# Patient Record
Sex: MALE | Employment: OTHER | URBAN - METROPOLITAN AREA
[De-identification: names, ages, dates, MRNs, and addresses within clinical notes are randomized per-mention and may not be internally consistent; named-entity substitution may affect disease eponyms.]

---

## 2018-02-22 ENCOUNTER — TRANSFERRED RECORDS (OUTPATIENT)
Dept: HEALTH INFORMATION MANAGEMENT | Facility: CLINIC | Age: 68
End: 2018-02-22

## 2018-04-11 ENCOUNTER — TRANSFERRED RECORDS (OUTPATIENT)
Dept: HEALTH INFORMATION MANAGEMENT | Facility: CLINIC | Age: 68
End: 2018-04-11

## 2018-05-23 ENCOUNTER — TRANSFERRED RECORDS (OUTPATIENT)
Dept: HEALTH INFORMATION MANAGEMENT | Facility: CLINIC | Age: 68
End: 2018-05-23

## 2018-05-29 ENCOUNTER — TRANSFERRED RECORDS (OUTPATIENT)
Dept: HEALTH INFORMATION MANAGEMENT | Facility: CLINIC | Age: 68
End: 2018-05-29

## 2018-06-01 ENCOUNTER — TRANSFERRED RECORDS (OUTPATIENT)
Dept: HEALTH INFORMATION MANAGEMENT | Facility: CLINIC | Age: 68
End: 2018-06-01

## 2018-09-14 ENCOUNTER — OFFICE VISIT (OUTPATIENT)
Dept: DERMATOLOGY | Facility: CLINIC | Age: 68
End: 2018-09-14
Payer: COMMERCIAL

## 2018-09-14 DIAGNOSIS — L23.89 ALLERGIC CONTACT DERMATITIS DUE TO OTHER AGENTS: Primary | ICD-10-CM

## 2018-09-14 DIAGNOSIS — L20.81 ATOPIC NEURODERMATITIS: ICD-10-CM

## 2018-09-14 RX ORDER — FLOMAX 0.4 MG/1
CAPSULE ORAL
COMMUNITY
Start: 2018-07-20

## 2018-09-14 RX ORDER — DOXEPIN HYDROCHLORIDE 50 MG/1
CAPSULE ORAL
COMMUNITY
Start: 2018-08-30

## 2018-09-14 RX ORDER — HYDROXYZINE HYDROCHLORIDE 25 MG/1
TABLET, FILM COATED ORAL
COMMUNITY
Start: 2018-08-30 | End: 2022-07-29

## 2018-09-14 RX ORDER — FOLIC ACID 1 MG/1
TABLET ORAL
COMMUNITY
Start: 2018-04-11 | End: 2022-07-29

## 2018-09-14 RX ORDER — PANTOPRAZOLE SODIUM 40 MG/1
TABLET, DELAYED RELEASE ORAL
COMMUNITY
Start: 2018-07-31

## 2018-09-14 RX ORDER — GABAPENTIN 300 MG/1
CAPSULE ORAL
COMMUNITY
Start: 2018-08-30 | End: 2022-07-29

## 2018-09-14 RX ORDER — CETIRIZINE HYDROCHLORIDE 10 MG/1
TABLET ORAL
COMMUNITY
Start: 2018-04-11 | End: 2022-07-29

## 2018-09-14 RX ORDER — ATORVASTATIN CALCIUM 80 MG/1
TABLET, FILM COATED ORAL
COMMUNITY
Start: 2018-04-11 | End: 2022-07-29

## 2018-09-14 RX ORDER — TRIAMCINOLONE ACETONIDE 1 MG/G
CREAM TOPICAL
COMMUNITY
Start: 2018-04-11 | End: 2022-07-29

## 2018-09-14 ASSESSMENT — PAIN SCALES - GENERAL: PAINLEVEL: NO PAIN (0)

## 2018-09-14 NOTE — PROGRESS NOTES
[facility] Dermatology Note      Dermatology Problem List:    Specialty Problems     None          CC:   Allergic Reaction (Mike is here for an allergy concult. He states that he has had itching for about 3 years. )      Encounter Date: Sep 14, 2018    History of Present Illness:  Mr. Mike Naidu is a 68 year old male who presents as a referral from [facility].    4th of July 3 years ago the itching started out of the blue. Since then every day itching.  In May 2018 in [facility] normal bone marrow exam (no signs for Lymphoma)  However, in gene rearrangement analysis some suspicion for monoclonal T-cell population  Since 2015 Methotrexate, UVB with no improvement  In Histology in [place] chronic dermatitis with eosinophilic spongiosis (2018)    Medications: Atorvastatin since about 3 years; Metoprolol since 10 years, Pantoprazole since 12 years.     Past Medical History:   There is no problem list on file for this patient.    No past medical history on file.    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 Patient has the following hobbies or non-occupational exposure: fishing and grand kids      Family History:  No family history on file.  Brothers daughter has atopic eczema  Patients own kids (5) and grand kids ==> on daughter had as child eczema and her son as well    Medications:  No current outpatient prescriptions on file.       Review of Systems:  -As per HPI  -Constitutional: The patient denies fatigue, fevers, chills, unintended weight loss, and night sweats.  -HEENT: Patient denies nonhealing oral sores.  -Skin: As above in HPI. No additional skin  concerns.    Physical exam:  Vitals: There were no vitals taken for this visit.  GEN: This is a well developed, well-nourished male in no acute distress, in a pleasant mood.    SKIN: Total skin excluding the undergarment areas was performed. The exam included the head/face, neck, both arms, chest, back, abdomen, both legs, digits and/or nails.   Subacute to chronic eczema on extremities and as well erythema on trunk. Generally dry skin and on extremities scratch marks and prurigo-like lesions. Over ears subacute eczemas with swelling and as well front and scalp with itch and desquamation    -No other lesions of concern on areas examined.     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 No oral Corticosteroids for 1 month before tests. Antihistamines are OK    Total IgE, CBC, spec IgE D. Pteronyssinus    Plan in future prick tests with HDM and moulds    --> if all tests negatives and no improvement after stopping medications, then maybe plan use of Dupixent.      Order for PATCH TESTS    [] Outpatient  [] Inpatient: [facility]..../ Bed ....      Skin Atopy (atopic dermatitis) [x] Yes   [] No  Rhinitis/Sinusitis:   [x] Yes   [] No  Allergic Asthma:   [] Yes   [x] No  Food Allergy:   [] Yes   [x] No  Leg ulcers:   [] Yes   [x] No  Hand eczema:   [x] Yes   [] No   Leading hand:   [x] R   [] L       [] Ambidextrous                        Reason for tests (suspected allergy): generalized subacute to chronic eczema with spongiosis and peripheral and skin  Eosinophilia  Known previous allergies: Sulphonamides, Valproic acid and Procaine    Standardized panels  [x] Standard panel (40 tests)  [x] Preservatives & Antimicrobials (31 tests)  [x] Emulsifiers & Additives (25 tests)   [] Perfumes/Flavours & Plants (25 tests)  [] Hairdresser panel (12 tests)  [] Rubber Chemicals (22 tests)  [] Plastics (26 tests)  [] Colorants/Dyes/Food additives (20 tests)  [] Metals (implants/dental) (23 tests)  [] Local anaesthetics/NSAIDs (12 tests)  [] Antibiotics & Antimycotics (14 tests)   [x] Corticosteroids (15 tests)   [] Photopatch test (32 tests)   [] others: ...      [] Patient's own products: ...    DO NOT test if chemical or biological identity is unknown!     always ask from patient the product information and safety sheets (MSDS)     [] Patient needs consultation with Allergy team (changes of tests may apply)  [x] Tests discussed with Allergy team (can have direct appointment for patch tests)    Follow-up for allergy tests    Total visit time > 30 mins, > 50% counseling and coordination of care.

## 2018-09-14 NOTE — MR AVS SNAPSHOT
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 No oral Corticosteroids for 1 month before tests. Antihistamines are OK    Total IgE, CBC, spec IgE D. Pteronyssinus    Plan in future prick tests with HDM and moulds    --> if all tests negatives and no improvement after stopping medications, then maybe plan use of Dupixent.      Order for PATCH TESTS    [] Outpatient  [] Inpatient: [facility]..../ Bed ....      Skin Atopy (atopic dermatitis) [x] Yes   [] No  Rhinitis/Sinusitis:   [x] Yes   [] No  Allergic Asthma:   [] Yes   [x] No  Food Allergy:   [] Yes   [x] No  Leg ulcers:   [] Yes   [x] No  Hand eczema:   [x] Yes   [] No   Leading hand:   [x] R   [] L       [] Ambidextrous                        Reason for tests (suspected allergy): generalized subacute to chronic eczema with spongiosis and peripheral and skin Eosinophilia  Known previous allergies: Sulphonamides, Valproic acid and Procaine    Standardized panels  [x] Standard panel (40 tests)  [x] Preservatives & Antimicrobials (31 tests)  [x] Emulsifiers & Additives (25 tests)   [] Perfumes/Flavours &  Plants (25 tests)  [] Hairdresser panel (12 tests)  [] Rubber Chemicals (22 tests)  [] Plastics (26 tests)  [] Colorants/Dyes/Food additives (20 tests)  [] Metals (implants/dental) (23 tests)  [] Local anaesthetics/NSAIDs (12 tests)  [] Antibiotics & Antimycotics (14 tests)   [x] Corticosteroids (15 tests)   [] Photopatch test (32 tests)   [] others: ...      [] Patient's own products: ...    DO NOT test if chemical or biological identity is unknown!     always ask from patient the product information and safety sheets (MSDS)             Follow-ups after your visit        Your next 10 appointments already scheduled     Sep 24, 2018  1:45 PM CDT   (Arrive by 1:30 PM)   Return Allergy with Leon Lanier MD   [facility]ermat[facility] ([place] [facility])    [address] [place] [id number] [phone]            Sep 26, 2018  2:45 PM CDT   (Arrive by 2:30 PM)   Return Allergy with Leon Lanier MD   [facility] ([place] [facility])    [address] [place] [id number] [phone]            Sep 28, 2018  9:00 AM CDT   (Arrive by 8:45 AM)   Return Allergy with Leon Lanier MD   [facility] ([place] [facility])    [address] [place] [id number] [phone]              Future tests that were ordered for you today     Open Future Orders        Priority Expected Expires Ordered    IgE Routine  9/14/2019 9/14/2018    CBC with platelets differential Routine  9/14/2019 9/14/2018            Who to contact     Please call your clinic at [phone] to:    Ask questions about your health    Make or cancel appointments    Discuss your medicines    Learn about your test results    Speak to your doctor            Additional Information About Your Visit        MyChart Information     Fire[employer] is an electronic gateway that provides easy, online access to your medical  records. With amiando, you can request a clinic appointment, read your test results, renew a prescription or communicate with your care team.     To sign up for amiando visit the website at [email or web address]   You will be asked to enter the access code listed below, as well as some personal information. Please follow the directions to create your username and password.     Your access code is: [email or web address]  Expires: 2018  6:31 AM     Your access code will  in 90 days. If you need help or a new code, please contact your [facility] or call [phone] for assistance.        Care EveryWhere ID     This is your Care EveryWhere ID. This could be used by other organizations to access your [place] medical records  [id number]         Blood Pressure from Last 3 Encounters:   No data found for BP    Weight from Last 3 Encounters:   No data found for Wt               Primary Care Provider    None Specified       No primary provider on file.        Equal Access to Services     [facility] : Hadii kaykay bullardo Solauro, waaxda luqadaha, qaybta kaalmada ademiladyaquang, nanette taylor . So [facility] [phone].    ATENCIÓN: Si habla español, tiene a [place] disposición servicios gratuitos de asistencia lingüística. Llame al [phone].    We comply with applicable federal civil rights laws and [place] laws. We do not discriminate on the basis of race, color, national origin, age, disability, sex, sexual orientation, or gender identity.            Thank you!     Thank you for choosing [facility] DERMATOLOGY  for your care. Our goal is always to provide you with excellent care. Hearing back from our patients is one way we can continue to improve our services. Please take a few minutes to complete the written survey that you may receive in the mail after your visit with us. Thank you!             Your Updated Medication List - Protect others around you: Learn  how to safely use, store and throw away your medicines at www.disposemymeds.org.          This list is accurate as of 9/14/18  2:46 PM.  Always use your most recent med list.                   Brand Name Dispense Instructions for use Diagnosis    atorvastatin 80 MG tablet    LIPITOR          cetirizine 10 MG tablet    zyrTEC          doxepin 50 MG capsule    SINEquan          FLOMAX 0.4 MG capsule   Generic drug:  tamsulosin           folic acid 1 MG tablet    FOLVITE          gabapentin 300 MG capsule    NEURONTIN          hydrOXYzine 25 MG tablet    ATARAX          pantoprazole 40 MG EC tablet    PROTONIX          RANITIDINE 150 MAX STRENGTH 150 MG tablet   Generic drug:  ranitidine           triamcinolone 0.1 % cream    KENALOG

## 2018-09-14 NOTE — LETTER
9/14/2018       RE: Mike Naidu  [address] [place] [address]     Dear Colleague,    Thank you for referring your patient, Mike Naidu, to the [facility] DERMATOLOGY at [facility]. Please see a copy of my visit note below.    [facility] Dermatology Note      Dermatology Problem List:    Specialty Problems     None          CC:   Allergic Reaction (Mike is here for an allergy concult. He states that he has had itching for about 3 years. )      Encounter Date: Sep 14, 2018    History of Present Illness:  Mr. Mike Naidu is a 68 year old male who presents as a referral from [place].    4th of July 3 years ago the itching started out of the blue. Since then every day itching.  In May 2018 in [facility] normal bone marrow exam (no signs for Lymphoma)  However, in gene rearrangement analysis some suspicion for monoclonal T-cell population  Since 2015 Methotrexate, UVB with no improvement  In Histology in [place] chronic dermatitis with eosinophilic spongiosis (2018)    Medications: Atorvastatin since about 3 years; Metoprolol since 10 years, Pantoprazole since 12 years.     Past Medical History:   There is no problem list on file for this patient.    No past medical history on file.    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 Patient has the following hobbies or non-occupational exposure: fishing and grand kids      Family History:  No family history on file.  Brothers daughter has atopic eczema  Patients own kids (5) and grand kids ==> on daughter had as child eczema and her son as  well    Medications:  No current outpatient prescriptions on file.       Review of Systems:  -As per HPI  -Constitutional: The patient denies fatigue, fevers, chills, unintended weight loss, and night sweats.  -HEENT: Patient denies nonhealing oral sores.  -Skin: As above in HPI. No additional skin concerns.    Physical exam:  Vitals: There were no vitals taken for this visit.  GEN: This is a well developed, well-nourished male in no acute distress, in a pleasant mood.    SKIN: Total skin excluding the undergarment areas was performed. The exam included the head/face, neck, both arms, chest, back, abdomen, both legs, digits and/or nails.   Subacute to chronic eczema on extremities and as well erythema on trunk. Generally dry skin and on extremities scratch marks and prurigo-like lesions. Over ears subacute eczemas with swelling and as well front and scalp with itch and desquamation    -No other lesions of concern on areas examined.     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 No oral Corticosteroids for 1 month before tests. Antihistamines are OK    Total IgE, CBC, spec IgE D. Pteronyssinus    Plan in future prick tests with HDM and moulds    --> if all tests negatives and no improvement after stopping medications, then maybe plan use of Dupixent.      Order for PATCH TESTS    [] Outpatient  [] Inpatient: [facility]..../ Bed ....      Skin Atopy (atopic dermatitis) [x] Yes   [] No  Rhinitis/Sinusitis:   [x] Yes   [] No  Allergic Asthma:   [] Yes   [x]  No  Food Allergy:   [] Yes   [x] No  Leg ulcers:   [] Yes   [x] No  Hand eczema:   [x] Yes   [] No   Leading hand:   [x] R   [] L       [] Ambidextrous                        Reason for tests (suspected allergy): generalized subacute to chronic eczema with spongiosis and peripheral and skin Eosinophilia  Known previous allergies: Sulphonamides, Valproic acid and Procaine    Standardized panels  [x] Standard panel (40 tests)  [x] Preservatives & Antimicrobials (31 tests)  [x] Emulsifiers & Additives (25 tests)   [] Perfumes/Flavours & Plants (25 tests)  [] Hairdresser panel (12 tests)  [] Rubber Chemicals (22 tests)  [] Plastics (26 tests)  [] Colorants/Dyes/Food additives (20 tests)  [] Metals (implants/dental) (23 tests)  [] Local anaesthetics/NSAIDs (12 tests)  [] Antibiotics & Antimycotics (14 tests)   [x] Corticosteroids (15 tests)   [] Photopatch test (32 tests)   [] others: ...      [] Patient's own products: ...    DO NOT test if chemical or biological identity is unknown!     always ask from patient the product information and safety sheets (MSDS)     [] Patient needs consultation with Allergy team (changes of tests may apply)  [x] Tests discussed with Allergy team (can have direct appointment for patch tests)    Follow-up for allergy tests    Total visit time > 30 mins, > 50% counseling and coordination of care.      Leon Lanier MD

## 2018-09-14 NOTE — PATIENT INSTRUCTIONS
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 No oral Corticosteroids for 1 month before tests. Antihistamines are OK    Total IgE, CBC, spec IgE D. Pteronyssinus    Plan in future prick tests with HDM and moulds    --> if all tests negatives and no improvement after stopping medications, then maybe plan use of Dupixent.      Order for PATCH TESTS    [] Outpatient  [] Inpatient: [facility]..../ Bed ....      Skin Atopy (atopic dermatitis) [x] Yes   [] No  Rhinitis/Sinusitis:   [x] Yes   [] No  Allergic Asthma:   [] Yes   [x] No  Food Allergy:   [] Yes   [x] No  Leg ulcers:   [] Yes   [x] No  Hand eczema:   [x] Yes   [] No   Leading hand:   [x] R   [] L       [] Ambidextrous                        Reason for tests (suspected allergy): generalized subacute to chronic eczema with spongiosis and peripheral and skin Eosinophilia  Known previous allergies: Sulphonamides, Valproic acid and Procaine    Standardized panels  [x] Standard panel (40 tests)  [x] Preservatives & Antimicrobials (31 tests)  [x] Emulsifiers & Additives (25 tests)   [] Perfumes/Flavours & Plants (25 tests)  [] Hairdresser panel (12 tests)  [] Rubber Chemicals (22 tests)  [] Plastics (26 tests)  [] Colorants/Dyes/Food additives (20 tests)  [] Metals (implants/dental) (23 tests)  [] Local anaesthetics/NSAIDs (12 tests)  [] Antibiotics & Antimycotics (14 tests)   [x] Corticosteroids (15 tests)   [] Photopatch test (32 tests)   [] others: ...      [] Patient's own products: ...    DO NOT test if  chemical or biological identity is unknown!     always ask from patient the product information and safety sheets (MSDS)

## 2018-09-14 NOTE — NURSING NOTE
Dermatology Rooming Note    Mike Naidu's goals for this visit include:   Chief Complaint   Patient presents with     Allergic Reaction     Mike is here for an allergy concult. He states that he has had itching for about 3 years.      Felicia Ceballos LPN

## 2018-09-20 ENCOUNTER — DOCUMENTATION ONLY (OUTPATIENT)
Dept: DERMATOLOGY | Facility: CLINIC | Age: 68
End: 2018-09-20

## 2018-09-20 NOTE — PROGRESS NOTES
Date/time of application:10/01/18   Physician/Nurse:  / Felicia Ceballos LPN               Localization of application: Back     STANDARD Series         No Substance 2 days 4 days remarks   1 Jhony Mix [C] - -    2 Colophony - -    3  2-Mercaptobenzothiazole  - -     4 Methylisothiazolinone - -    5 Carba Mix - -    6 Thiurma Mix [A] - -    7 Bisphenol A Epoxy Resin - -    8 U-Oolv-Ilfmrkmnhmo-Formaldehyde Resin - -    9 Mercapto Mix [A] - -    10 Black Rubber Mix- PPD [B] - -    11 Potassium Dichromate  -  -    12 Balsam of [place] (Myroxylon Pereirae Resin) - -    13 Nickel Sulphate Hexahydrate - -    14 Mixed Dialkyl Thiourea - -    15 Paraben Mix [B] - -    16 Methyldibromo Glutaronitrile - -    17 Fragrance Mix - -    18 2-Bromo-2-Nitropropane-1,3-Diol (Bronopol) - -    19 Lyral - -    20 Tixocortol-21- Pivalate - -    21 Diazolidiyl Urea (Germall II) - -    22 Methyl Methacrylate - -    23 Cobalt (II) Chloride Hexahydrate - -    24 Fragrance Mix II  - -    25 Compositae Mix - -    26 Benzoyl Peroxide - -    27 Bacitracin - -    28 Formaldehyde - -    29 Methylchloroisothiazolinone / Methylisothiazolinone - -    30 Corticosteroid Mix - -    31 Sodium Lauryl Sulfate - -    32 Lanolin Alcohol - -    33 Turpentine - -    34 Cetylstearylalcohol - -    35 Chlorhexidine Dicluconate - -    36 Budenoside - -    37 Imidazolidinyl Urea  - -    38 Ethyl-2 Cyanoacrylate - -    39 Quaternium 15 (Dowicil 200) - -    40 Decyl Glucoside - -      PRESERVATIVES & ANTIMICROBIALS         No Substance 2 days 4 days remarks   41  1,2-Benzisothiazoline-3-One, Sodium Salt - -    42  1,3,5-Antwan (2-Hydroxyethyl) - Hexahydrotriazine (Grotan BK) - -    43 8-Irjhjsyuyskoy-8-Nitro-1, 3-Propanediol - -    44  3, 4, 4' - Triclocarban - -    45 4 - Chloro - 3 - Cresol - -    46 4 - Chloro - 4 - Xylenol (PCMX) - -    47 7-Ethylbicyclooxazolidine ([facility] [id number]) - -    48 Benzalkonium Chloride - -    49 Benzyl Alcohol - -    50  Cetalkonium Chloride - -    51 Cetylpyrimidine Chloride  - -    NA Chloroacetamide NA NA    52 DMDM Hydantoin - -    53 Glutaraldehyde - -    54 Triclosan - -    55 Glyoxal Trimeric Dihydrate - -    56 Iodopropynyl Butylcarbamate - -    57 Octylisothiazoline - -    58 Iodoform - -    59 (Nitrobutyl) Morpholine/(Ethylnitro-Trimethylene) Dimorpholine (Bioban P 1487) - -    60 Phenoxyethanol - -    61 Phenyl Salicylate - -    62 Povidone Iodine - -    63 Sodium Benzoate - -    64 Sodium Disulfite - -    65 Sorbic Acid - -    66 Thimerosal - -     Parabens      67 Butyl-P-Hydroxybenzoate - -    68 Ethyl-P-Hydroxybenzoate - -    69 Methyl-P-Hydroxybenzoate - -    70 Propyl-P-Hydroxybenzoate - -      EMULSIFIERS & ADDITIVES        No Substance 2 days 4 days remarks   71 Polyethylene Glycol-400 - -    72 Cocamidopropyl Betaine - -    73 Amerchol L101 - -    74 Propylene Glycol - -    75 Triethanolamine - -    76 Sorbitane Sesquiolate - -    77 Isopropylmyristate - -    78 Polysorbate 80  - -    79 Amidoamine   (Stearamidopropyl Dimethylamine) - -    80 Oleamidopropyl Dimethylamine - -    81 Lauryl Glucoside - -    82 Coconut Diethanolamide  - -    83 2-Hydroxy-4-Methoxy Benzophenone (Oxybenzone) - -    84 Benzophenone-4 (Sulisobenzon) - -    85 Propolis - -    86 Dexpanthenol - -    87 Carboxymethyl Cellulose Sodium - -    88 Abitol - -    89 Tert-Butylhydroquinone - -    90 Benzyl Salicylate - -     Antioxidant      91 Dodecyl Gallate - -    92 Butylhydroxyanisole (BHA) - -    93 Butylhydroxytoluene (BHT) - -    94 Di-Alpha-Tocopherol (Vit E) - -    95 Propyl Gallate - -          CORTICOSTEROIDS    No Substance 2 days 4 days remarks Allergy  class   96 Amcinonide - -  B   97 Betametasone-17,21 Dipropionate - -  D1   98 Desoximetasone - -  C   99 Betamethasone-17-Valerate - -  D1   100 Dexamethasone - -  C   101 Hydrocortisone - -  A   102 Clobetasol-17-Propionate - -  D1   103 Dexamethasone-21-Phosphate Disodium Salt - -   C   104 Hydrocortisone-17 Butyrate - -  D2   105 Prednisolone - -  A   106 Mometason Furoate - -  D1   107 Triamcinolone Acetonide - -  B   108 Methylprednisolone Aceponate - -  D2   109 Hydrocortisone-21-Acetate - -  A   110 Prednicarbate - -  D2       Group Characteristics of group Generic name Name  cross reactions   A Hydrocortisone   Cloprednole, Fludrocortisone acétate, Hydrocortison acetate, Methylprednisolone, Prednisolone, Tixocortolpivalate Alfacortone, Fucidin H, Dermacalm, Hexacortone, Premandole, Imacort With group D2   B Triamcinolone-acetonide   Budenoside (R-isomer), Amcinonide, Desonide, Fluocinolone acetonide, Triamcinolone acetonide Locapred, Locatop  Synalar, Pevisone, Kenacort -   C Betamethasone (Without Romina)   Betamethasone, Dexamethasone, Flumethasone pivalate, Halomethasone Daivobet, Dexasalyl, Locasalen,   -   D1 Betamethasone-diproprionate   Betamethasone dipropionate, Betamethasone-17-valerate, Clobetasole-propionate, Fluticasone propionate, Mometasone furoate Betnovate, Diprogenta, Diprosalic, Diprosone, Celestoderm, Fucicort,  Cutivate, Axotide, Elocom -   D2 Methylprednisolone-aceponate   Hydrocortisone-aceponate, Hydrocortisone-buteprate, Hydrocortisone-17-butyrate, Methylprednisolone aceponate, Prednicarbate Locoïd, Advantan,  Prednitop With group A and Budesonide (S-isomer)             Results of patch tests:                         Interpretation:    - Negative                    A    = Allergic      (+) Erythema    TI   = Toxic/irritant   + E + Infiltration    RaP = Relevance at Present     ++ E/I + Papulovesicle   Rpr  = Relevance Previously     +++ E/I/P + Blister     nR   = No Relevance

## 2018-09-21 ENCOUNTER — TRANSFERRED RECORDS (OUTPATIENT)
Dept: HEALTH INFORMATION MANAGEMENT | Facility: CLINIC | Age: 68
End: 2018-09-21

## 2018-09-21 ENCOUNTER — TELEPHONE (OUTPATIENT)
Dept: DERMATOLOGY | Facility: CLINIC | Age: 68
End: 2018-09-21

## 2018-09-21 NOTE — TELEPHONE ENCOUNTER
[facility] Call Center    Phone Message    May a detailed message be left on voicemail: yes    Reason for Call: Other: [facility]ts[facility] for next week and reschedule patch testing.  Please follow up with the PT.      Action Taken: Message routed to:  [facility] Derm

## 2018-10-01 ENCOUNTER — OFFICE VISIT (OUTPATIENT)
Dept: DERMATOLOGY | Facility: CLINIC | Age: 68
End: 2018-10-01
Payer: COMMERCIAL

## 2018-10-01 ENCOUNTER — TELEPHONE (OUTPATIENT)
Dept: DERMATOLOGY | Facility: CLINIC | Age: 68
End: 2018-10-01

## 2018-10-01 ENCOUNTER — DOCUMENTATION ONLY (OUTPATIENT)
Dept: DERMATOLOGY | Facility: CLINIC | Age: 68
End: 2018-10-01

## 2018-10-01 DIAGNOSIS — L23.89 ALLERGIC CONTACT DERMATITIS DUE TO OTHER AGENTS: Primary | ICD-10-CM

## 2018-10-01 ASSESSMENT — PAIN SCALES - GENERAL: PAINLEVEL: NO PAIN (0)

## 2018-10-01 NOTE — NURSING NOTE
Rooming Note    Mike Naidu's goals for this visit include:   Chief Complaint   Patient presents with     Allergic Reaction     Mike is here for patch testing day 1     Felicia Ceballos LPN

## 2018-10-01 NOTE — PROGRESS NOTES
Date/time of application:10/01/18   Physician/Nurse:  / Felicia Ceballos LPN               Localization of application: Back     STANDARD Series         No Substance 2 days 4 days remarks   1 Jhony Mix [C] - -    2 Colophony - -    3  2-Mercaptobenzothiazole  - -     4 Methylisothiazolinone - -    5 Carba Mix - -    6 Thiurma Mix [A] - -    7 Bisphenol A Epoxy Resin - -    8 C-Nuhy-Nucwnxsxxuy-Formaldehyde Resin - -    9 Mercapto Mix [A] - -    10 Black Rubber Mix- PPD [B] - -    11 Potassium Dichromate  -  -    12 Balsam of [place] (Myroxylon Pereirae Resin) - -    13 Nickel Sulphate Hexahydrate - -    14 Mixed Dialkyl Thiourea - -    15 Paraben Mix [B] - -    16 Methyldibromo Glutaronitrile - -    17 Fragrance Mix - -    18 2-Bromo-2-Nitropropane-1,3-Diol (Bronopol) - -    19 Lyral - -    20 Tixocortol-21- Pivalate - -    21 Diazolidiyl Urea (Germall II) - -    22 Methyl Methacrylate - -    23 Cobalt (II) Chloride Hexahydrate - -    24 Fragrance Mix II  - -    25 Compositae Mix - -    26 Benzoyl Peroxide - -    27 Bacitracin - -    28 Formaldehyde - -    29 Methylchloroisothiazolinone / Methylisothiazolinone - -    30 Corticosteroid Mix - -    31 Sodium Lauryl Sulfate - -    32 Lanolin Alcohol - -    33 Turpentine - -    34 Cetylstearylalcohol - -    35 Chlorhexidine Dicluconate - -    36 Budenoside - -    37 Imidazolidinyl Urea  - -    38 Ethyl-2 Cyanoacrylate - -    39 Quaternium 15 (Dowicil 200) - -    40 Decyl Glucoside - -      PRESERVATIVES & ANTIMICROBIALS         No Substance 2 days 4 days remarks   41  1,2-Benzisothiazoline-3-One, Sodium Salt - -    42  1,3,5-Antwan (2-Hydroxyethyl) - Hexahydrotriazine (Grotan BK) - -    43 4-Usfyojhrerava-6-Nitro-1, 3-Propanediol - -    44  3, 4, 4' - Triclocarban - -    45 4 - Chloro - 3 - Cresol - -    46 4 - Chloro - 4 - Xylenol (PCMX) - -    47 7-Ethylbicyclooxazolidine ([facility] [id number]) - -    48 Benzalkonium Chloride - -    49 Benzyl Alcohol - -    50  Cetalkonium Chloride - -    51 Cetylpyrimidine Chloride  - -    NA Chloroacetamide NA NA    52 DMDM Hydantoin - -    53 Glutaraldehyde - -    54 Triclosan - -    55 Glyoxal Trimeric Dihydrate - -    56 Iodopropynyl Butylcarbamate - -    57 Octylisothiazoline - -    58 Iodoform - -    59 (Nitrobutyl) Morpholine/(Ethylnitro-Trimethylene) Dimorpholine (Bioban P 1487) - -    60 Phenoxyethanol - -    61 Phenyl Salicylate - -    62 Povidone Iodine - -    63 Sodium Benzoate - -    64 Sodium Disulfite - -    65 Sorbic Acid - -    66 Thimerosal - -     Parabens      67 Butyl-P-Hydroxybenzoate - -    68 Ethyl-P-Hydroxybenzoate - -    69 Methyl-P-Hydroxybenzoate - -    70 Propyl-P-Hydroxybenzoate - -      EMULSIFIERS & ADDITIVES        No Substance 2 days 4 days remarks   71 Polyethylene Glycol-400 - -    72 Cocamidopropyl Betaine - -    73 Amerchol L101 - -    74 Propylene Glycol - -    75 Triethanolamine - -    76 Sorbitane Sesquiolate - -    77 Isopropylmyristate - -    78 Polysorbate 80  - -    79 Amidoamine   (Stearamidopropyl Dimethylamine) - -    80 Oleamidopropyl Dimethylamine - -    81 Lauryl Glucoside - -    82 Coconut Diethanolamide  - -    83 2-Hydroxy-4-Methoxy Benzophenone (Oxybenzone) - -    84 Benzophenone-4 (Sulisobenzon) - -    85 Propolis - -    86 Dexpanthenol - -    87 Carboxymethyl Cellulose Sodium - -    88 Abitol - -    89 Tert-Butylhydroquinone - -    90 Benzyl Salicylate - -     Antioxidant      91 Dodecyl Gallate - -    92 Butylhydroxyanisole (BHA) - -    93 Butylhydroxytoluene (BHT) - -    94 Di-Alpha-Tocopherol (Vit E) - -    95 Propyl Gallate - -          CORTICOSTEROIDS    No Substance 2 days 4 days remarks Allergy  class   96 Amcinonide - -  B   97 Betametasone-17,21 Dipropionate - -  D1   98 Desoximetasone - -  C   99 Betamethasone-17-Valerate - -  D1   100 Dexamethasone - -  C   101 Hydrocortisone - -  A   102 Clobetasol-17-Propionate - -  D1   103 Dexamethasone-21-Phosphate Disodium Salt - -   C   104 Hydrocortisone-17 Butyrate - -  D2   105 Prednisolone - -  A   106 Mometason Furoate - -  D1   107 Triamcinolone Acetonide - -  B   108 Methylprednisolone Aceponate - -  D2   109 Hydrocortisone-21-Acetate - -  A   110 Prednicarbate - -  D2       Group Characteristics of group Generic name Name  cross reactions   A Hydrocortisone   Cloprednole, Fludrocortisone acétate, Hydrocortison acetate, Methylprednisolone, Prednisolone, Tixocortolpivalate Alfacortone, Fucidin H, Dermacalm, Hexacortone, Premandole, Imacort With group D2   B Triamcinolone-acetonide   Budenoside (R-isomer), Amcinonide, Desonide, Fluocinolone acetonide, Triamcinolone acetonide Locapred, Locatop  Synalar, Pevisone, Kenacort -   C Betamethasone (Without Romina)   Betamethasone, Dexamethasone, Flumethasone pivalate, Halomethasone Daivobet, Dexasalyl, Locasalen,   -   D1 Betamethasone-diproprionate   Betamethasone dipropionate, Betamethasone-17-valerate, Clobetasole-propionate, Fluticasone propionate, Mometasone furoate Betnovate, Diprogenta, Diprosalic, Diprosone, Celestoderm, Fucicort,  Cutivate, Axotide, Elocom -   D2 Methylprednisolone-aceponate   Hydrocortisone-aceponate, Hydrocortisone-buteprate, Hydrocortisone-17-butyrate, Methylprednisolone aceponate, Prednicarbate Locoïd, Advantan,  Prednitop With group A and Budesonide (S-isomer)             Results of patch tests:                         Interpretation:    - Negative                    A    = Allergic      (+) Erythema    TI   = Toxic/irritant   + E + Infiltration    RaP = Relevance at Present     ++ E/I + Papulovesicle   Rpr  = Relevance Previously     +++ E/I/P + Blister     nR   = No Relevance

## 2018-10-01 NOTE — PROGRESS NOTES
[facility] Dermatology Note      Dermatology Problem List:    Specialty Problems     None          CC:   Allergic Reaction (Mike is here for patch testing day 1)      Encounter Date: Oct 1, 2018    History of Present Illness:  Mr. Mike Naidu is a 68 year old male who presents as a referral from [place].    4th of July 3 years ago the itching started out of the blue. Since then every day itching.  In May 2018 in [facility] normal bone marrow exam (no signs for Lymphoma)  However, in gene rearrangement analysis some suspicion for monoclonal T-cell population  Since 2015 Methotrexate, UVB with no improvement  In Histology in [place] chronic dermatitis with eosinophilic spongiosis (2018)    Medications: Atorvastatin since about 3 years; Metoprolol since 10 years, Pantoprazole since 12 years.     Past Medical History:   There is no problem list on file for this patient.    No past medical history on file.    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 Patient has the following hobbies or non-occupational exposure: fishing and grand kids      Family History:  No family history on file.  Brothers daughter has atopic eczema  Patients own kids (5) and grand kids ==> on daughter had as child eczema and her son as well    Medications:  Current Outpatient Prescriptions   Medication Sig Dispense Refill     atorvastatin (LIPITOR) 80 MG tablet        cetirizine (ZYRTEC) 10 MG tablet        doxepin (SINEQUAN) 50 MG capsule        FLOMAX 0.4 MG capsule        folic acid (FOLVITE) 1 MG tablet        gabapentin (NEURONTIN) 300 MG capsule        hydrOXYzine (ATARAX) 25 MG  tablet        pantoprazole (PROTONIX) 40 MG EC tablet        RANITIDINE 150 MAX STRENGTH 150 MG tablet        triamcinolone (KENALOG) 0.1 % cream          Review of Systems:  -As per HPI  -Constitutional: The patient denies fatigue, fevers, chills, unintended weight loss, and night sweats.  -HEENT: Patient denies nonhealing oral sores.  -Skin: As above in HPI. No additional skin concerns.    Physical exam:  Vitals: There were no vitals taken for this visit.  GEN: This is a well developed, well-nourished male in no acute distress, in a pleasant mood.    SKIN: Total skin excluding the undergarment areas was performed. The exam included the head/face, neck, both arms, chest, back, abdomen, both legs, digits and/or nails.   Subacute to chronic eczema on extremities and as well erythema on trunk. Generally dry skin and on extremities scratch marks and prurigo-like lesions. Over ears subacute eczemas with swelling and as well front and scalp with itch and desquamation    -No other lesions of concern on areas examined.         Order for PATCH TESTS    [] Outpatient  [] Inpatient: [facility]..../ Bed ....      Skin Atopy (atopic dermatitis) [x] Yes   [] No  Rhinitis/Sinusitis:   [x] Yes   [] No  Allergic Asthma:   [] Yes   [x] No  Food Allergy:   [] Yes   [x] No  Leg ulcers:   [] Yes   [x] No  Hand eczema:   [x] Yes   [] No   Leading hand:   [x] R   [] L       [] Ambidextrous                        Reason for tests (suspected allergy): generalized subacute to chronic eczema with spongiosis and peripheral and skin Eosinophilia  Known previous allergies: Sulphonamides, Valproic acid and Procaine    Standardized panels  [x] Standard panel (40 tests)  [x] Preservatives & Antimicrobials (31 tests)  [x] Emulsifiers & Additives (25 tests)   [] Perfumes/Flavours & Plants (25 tests)  [] Hairdresser panel (12 tests)  [] Rubber Chemicals (22 tests)  [] Plastics (26 tests)  [] Colorants/Dyes/Food additives (20 tests)  [] Metals  (implants/dental) (23 tests)  [] Local anaesthetics/NSAIDs (12 tests)  [] Antibiotics & Antimycotics (14 tests)   [x] Corticosteroids (15 tests)   [] Photopatch test (32 tests)   [] others: ...      [] Patient's own products: ...    DO NOT test if chemical or biological identity is unknown!     always ask from patient the product information and safety sheets (MSDS)     [] Patient needs consultation with Allergy team (changes of tests may apply)  [x] Tests discussed with Allergy team (can have direct appointment for patch tests)    RESULTS & EVALUATION of PATCH TESTS    Date/time of application:  Physician/Nurse:  / Felicia Ceballos LPN               Localization of application: Back --> on lower back chronic eczema, but upper back can be used for patch tests. Rather dry there, but no acute eczema    Patch test readings after     [x] 2 days, [] 3 days [x] 4 days, [] 5 days,   Other duration: ...    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 Pteronyssinus --> [facility]?    --> Plan in future prick tests with HDM and moulds    ==> Treatment prescribed/Plan:  --> if all tests negatives and no improvement after stopping medications, then maybe plan use of Dupixent.

## 2018-10-01 NOTE — TELEPHONE ENCOUNTER
[facility] Call Center    Phone Message    May a detailed message be left on voicemail: yes    Reason for Call: PT is coming in at 1pm to see Dr. Lanier in derm allergy. [facility] has [facility] the [facility] that [facility] since pt can't be seen without lab results. Please call the [facility] back at [phone] for Amie. Thanks!    Action Taken: Message routed to:  [facility] DERM

## 2018-10-01 NOTE — MR AVS SNAPSHOT
After Visit Summary   10/1/2018    Mike Naidu    MRN: [id number]           Patient Information     Date Of Birth          1950        Visit Information        Provider Department      10/1/2018 1:30 PM Leon Lanier MD [facility] Dermatology        Today's Diagnoses     Allergic contact dermatitis due to other agents    -  1       Follow-ups after your visit        Your next 10 appointments already scheduled     Oct 03, 2018  3:45 PM CDT   (Arrive by 3:30 PM)   Return Allergy with MD [facility] ([facility])    [address] [place] [id number] [phone]            Oct 05, 2018  1:30 PM CDT   (Arrive by 1:15 PM)   Return Allergy with MD [facility] ([facility])    [address] [place] [id number] [phone]              Who to contact     Please call your clinic at [phone] to:    Ask questions about your health    Make or cancel appointments    Discuss your medicines    Learn about your test results    Speak to your doctor            Additional Information About Your Visit        MyChart Information     LinQpayt is an electronic gateway that provides easy, online access to your medical records. With Go800, you can request a clinic appointment, read your test results, renew a prescription or communicate with your care team.     To sign up for LinQpayt visit the website at [email or web address]   You will be asked to enter the access code listed below, as well as some personal information. Please follow the directions to create your username and password.     Your access code is: [email or web address]  Expires: 2018  6:31 AM     Your access code will  in 90 days. If you need help or a new code, please contact your [facility] or call [phone] for assistance.        Care  EveryWhere ID     This is your Care EveryWhere ID. This could be used by other organizations to access your [facility] medical records  [id number]         Blood Pressure from Last 3 Encounters:   No data found for BP    Weight from Last 3 Encounters:   No data found for Wt              We Performed the Following     PATCH TESTS, EACH     PATCH TESTS, EACH        Primary Care Provider    None Specified       No primary provider on file.        Equal Access to Services     [facility]: Hadii aad ku hadasho Soomaali, waaxda luqadaha, qaybta kaalmada adeegyada, nanette davila haybrodyn ademilad malgorzatamejia taylor . So [facility] [phone].    ATENCIÓN: Si habla español, tiene a [place] disposición servicios gratuitos de asistencia lingüística. Llame al [phone].    We comply with applicable federal civil rights laws and [place] laws. We do not discriminate on the basis of race, color, national origin, age, disability, sex, sexual orientation, or gender identity.            Thank you!     Thank you for choosing [facility] DERMATOLOGY  for your care. Our goal is always to provide you with excellent care. Hearing back from our patients is one way we can continue to improve our services. Please take a few minutes to complete the written survey that you may receive in the mail after your visit with us. Thank you!             Your Updated Medication List - Protect others around you: Learn how to safely use, store and throw away your medicines at www.disposemymeds.org.          This list is accurate as of 10/1/18  4:37 PM.  Always use your most recent med list.                   Brand Name Dispense Instructions for use Diagnosis    atorvastatin 80 MG tablet    LIPITOR          cetirizine 10 MG tablet    zyrTEC          doxepin 50 MG capsule    SINEquan          FLOMAX 0.4 MG capsule   Generic drug:  tamsulosin           folic acid 1 MG tablet    FOLVITE          gabapentin 300 MG capsule    NEURONTIN          hydrOXYzine 25 MG tablet     ATARAX          pantoprazole 40 MG EC tablet    PROTONIX          RANITIDINE 150 MAX STRENGTH 150 MG tablet   Generic drug:  ranitidine           triamcinolone 0.1 % cream    KENALOG

## 2018-10-01 NOTE — LETTER
10/1/2018       RE: Mike Naidu  [id number] [facility] [address]     Dear Colleague,    Thank you for referring your patient, Mike Naidu, to the [facility] DERMATOLOGY at [facility]. Please see a copy of my visit note below.    Date/time of application:10/01/18   Physician/Nurse:  / Felicia Ceballos LPN               Localization of application: Back     STANDARD Series         No Substance 2 days 4 days remarks   1 Jhony Mix [C] - -    2 Colophony - -    3  2-Mercaptobenzothiazole  - -     4 Methylisothiazolinone - -    5 Carba Mix - -    6 Thiurma Mix [A] - -    7 Bisphenol A Epoxy Resin - -    8 G-Ebkq-Qqlasneklfv-Formaldehyde Resin - -    9 Mercapto Mix [A] - -    10 Black Rubber Mix- PPD [B] - -    11 Potassium Dichromate  -  -    12 Balsam of [place] (Myroxylon Pereirae Resin) - -    13 Nickel Sulphate Hexahydrate - -    14 Mixed Dialkyl Thiourea - -    15 Paraben Mix [B] - -    16 Methyldibromo Glutaronitrile - -    17 Fragrance Mix - -    18 2-Bromo-2-Nitropropane-1,3-Diol (Bronopol) - -    19 Lyral - -    20 Tixocortol-21- Pivalate - -    21 Diazolidiyl Urea (Germall II) - -    22 Methyl Methacrylate - -    23 Cobalt (II) Chloride Hexahydrate - -    24 Fragrance Mix II  - -    25 Compositae Mix - -    26 Benzoyl Peroxide - -    27 Bacitracin - -    28 Formaldehyde - -    29 Methylchloroisothiazolinone / Methylisothiazolinone - -    30 Corticosteroid Mix - -    31 Sodium Lauryl Sulfate - -    32 Lanolin Alcohol - -    33 Turpentine - -    34 Cetylstearylalcohol - -    35 Chlorhexidine Dicluconate - -    36 Budenoside - -    37 Imidazolidinyl Urea  - -    38 Ethyl-2 Cyanoacrylate - -    39 Quaternium 15 (Dowicil 200) - -    40 Decyl Glucoside - -      PRESERVATIVES & ANTIMICROBIALS         No Substance 2 days 4 days remarks   41  1,2-Benzisothiazoline-3-One, Sodium Salt - -    42  1,3,5-Antwan (2-Hydroxyethyl) - Hexahydrotriazine (Jeffersontan  BK) - -    43 6-Xsdbtdcpmdang-9-Nitro-1, 3-Propanediol - -    44  3, 4, 4' - Triclocarban - -    45 4 - Chloro - 3 - Cresol - -    46 4 - Chloro - 4 - Xylenol (PCMX) - -    47 7-Ethylbicyclooxazolidine (Bioban [id number]) - -    48 Benzalkonium Chloride - -    49 Benzyl Alcohol - -    50 Cetalkonium Chloride - -    51 Cetylpyrimidine Chloride  - -    NA Chloroacetamide NA NA    52 DMDM Hydantoin - -    53 Glutaraldehyde - -    54 Triclosan - -    55 Glyoxal Trimeric Dihydrate - -    56 Iodopropynyl Butylcarbamate - -    57 Octylisothiazoline - -    58 Iodoform - -    59 (Nitrobutyl) Morpholine/(Ethylnitro-Trimethylene) Dimorpholine (Act-On Softwarean P 1487) - -    60 Phenoxyethanol - -    61 Phenyl Salicylate - -    62 Povidone Iodine - -    63 Sodium Benzoate - -    64 Sodium Disulfite - -    65 Sorbic Acid - -    66 Thimerosal - -     Parabens      67 Butyl-P-Hydroxybenzoate - -    68 Ethyl-P-Hydroxybenzoate - -    69 Methyl-P-Hydroxybenzoate - -    70 Propyl-P-Hydroxybenzoate - -      EMULSIFIERS & ADDITIVES        No Substance 2 days 4 days remarks   71 Polyethylene Glycol-400 - -    72 Cocamidopropyl Betaine - -    73 Amerchol L101 - -    74 Propylene Glycol - -    75 Triethanolamine - -    76 Sorbitane Sesquiolate - -    77 Isopropylmyristate - -    78 Polysorbate 80  - -    79 Amidoamine   (Stearamidopropyl Dimethylamine) - -    80 Oleamidopropyl Dimethylamine - -    81 Lauryl Glucoside - -    82 Coconut Diethanolamide  - -    83 2-Hydroxy-4-Methoxy Benzophenone (Oxybenzone) - -    84 Benzophenone-4 (Sulisobenzon) - -    85 Propolis - -    86 Dexpanthenol - -    87 Carboxymethyl Cellulose Sodium - -    88 Abitol - -    89 Tert-Butylhydroquinone - -    90 Benzyl Salicylate - -     Antioxidant      91 Dodecyl Gallate - -    92 Butylhydroxyanisole (BHA) - -    93 Butylhydroxytoluene (BHT) - -    94 Di-Alpha-Tocopherol (Vit E) - -    95 Propyl Gallate - -          CORTICOSTEROIDS    No Substance 2 days 4 days remarks  Allergy  class   96 Amcinonide - -  B   97 Betametasone-17,21 Dipropionate - -  D1   98 Desoximetasone - -  C   99 Betamethasone-17-Valerate - -  D1   100 Dexamethasone - -  C   101 Hydrocortisone - -  A   102 Clobetasol-17-Propionate - -  D1   103 Dexamethasone-21-Phosphate Disodium Salt - -  C   104 Hydrocortisone-17 Butyrate - -  D2   105 Prednisolone - -  A   106 Mometason Furoate - -  D1   107 Triamcinolone Acetonide - -  B   108 Methylprednisolone Aceponate - -  D2   109 Hydrocortisone-21-Acetate - -  A   110 Prednicarbate - -  D2       Group Characteristics of group Generic name Name  cross reactions   A Hydrocortisone   Cloprednole, Fludrocortisone acétate, Hydrocortison acetate, Methylprednisolone, Prednisolone, Tixocortolpivalate Alfacortone, Fucidin H, Dermacalm, Hexacortone, Premandole, Imacort With group D2   B Triamcinolone-acetonide   Budenoside (R-isomer), Amcinonide, Desonide, Fluocinolone acetonide, Triamcinolone acetonide Locapred, Locatop  Synalar, Pevisone, Kenacort -   C Betamethasone (Without Romina)   Betamethasone, Dexamethasone, Flumethasone pivalate, Halomethasone Daivobet, Dexasalyl, Locasalen,   -   D1 Betamethasone-diproprionate   Betamethasone dipropionate, Betamethasone-17-valerate, Clobetasole-propionate, Fluticasone propionate, Mometasone furoate Betnovate, Diprogenta, Diprosalic, Diprosone, Celestoderm, Fucicort,  Cutivate, Axotide, Elocom -   D2 Methylprednisolone-aceponate   Hydrocortisone-aceponate, Hydrocortisone-buteprate, Hydrocortisone-17-butyrate, Methylprednisolone aceponate, Prednicarbate Locoïd, Advantan,  Prednitop With group A and Budesonide (S-isomer)             Results of patch tests:                         Interpretation:    - Negative                    A    = Allergic      (+) Erythema    TI   = Toxic/irritant   + E + Infiltration    RaP = Relevance at Present     ++ E/I + Papulovesicle   Rpr  = Relevance Previously     +++ E/I/P + Blister     nR   = No  Relevance          [facility]      Dermatology Problem List:    Specialty Problems     None          CC:   Allergic Reaction (Mike is here for patch testing day 1)      Encounter Date: Oct 1, 2018    History of Present Illness:  Mr. Mike Naidu is a 68 year old male who presents as a referral from [place].    4th of July 3 years ago the itching started out of the blue. Since then every day itching.  In May 2018 in [facility] normal bone marrow exam (no signs for Lymphoma)  However, in gene rearrangement analysis some suspicion for monoclonal T-cell population  Since 2015 Methotrexate, UVB with no improvement  In Histology in [place] chronic dermatitis with eosinophilic spongiosis (2018)    Medications: Atorvastatin since about 3 years; Metoprolol since 10 years, Pantoprazole since 12 years.     Past Medical History:   There is no problem list on file for this patient.    No past medical history on file.    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 Patient has the following hobbies or non-occupational exposure: fishing and grand kids      Family History:  No family history on file.  Brothers daughter has atopic eczema  Patients own kids (5) and grand kids ==> on daughter had as child eczema and her son as well    Medications:  Current Outpatient Prescriptions   Medication Sig Dispense Refill     atorvastatin (LIPITOR) 80 MG tablet        cetirizine (ZYRTEC) 10 MG tablet        doxepin (SINEQUAN) 50 MG capsule        FLOMAX 0.4 MG capsule        folic acid (FOLVITE) 1 MG tablet        gabapentin (NEURONTIN) 300 MG capsule         hydrOXYzine (ATARAX) 25 MG tablet        pantoprazole (PROTONIX) 40 MG EC tablet        RANITIDINE 150 MAX STRENGTH 150 MG tablet        triamcinolone (KENALOG) 0.1 % cream          Review of Systems:  -As per HPI  -Constitutional: The patient denies fatigue, fevers, chills, unintended weight loss, and night sweats.  -HEENT: Patient denies nonhealing oral sores.  -Skin: As above in HPI. No additional skin concerns.    Physical exam:  Vitals: There were no vitals taken for this visit.  GEN: This is a well developed, well-nourished male in no acute distress, in a pleasant mood.    SKIN: Total skin excluding the undergarment areas was performed. The exam included the head/face, neck, both arms, chest, back, abdomen, both legs, digits and/or nails.   Subacute to chronic eczema on extremities and as well erythema on trunk. Generally dry skin and on extremities scratch marks and prurigo-like lesions. Over ears subacute eczemas with swelling and as well front and scalp with itch and desquamation    -No other lesions of concern on areas examined.         Order for PATCH TESTS    [] Outpatient  [] Inpatient: [facility]..../ Bed ....      Skin Atopy (atopic dermatitis) [x] Yes   [] No  Rhinitis/Sinusitis:   [x] Yes   [] No  Allergic Asthma:   [] Yes   [x] No  Food Allergy:   [] Yes   [x] No  Leg ulcers:   [] Yes   [x] No  Hand eczema:   [x] Yes   [] No   Leading hand:   [x] R   [] L       [] Ambidextrous                        Reason for tests (suspected allergy): generalized subacute to chronic eczema with spongiosis and peripheral and skin Eosinophilia  Known previous allergies: Sulphonamides, Valproic acid and Procaine    Standardized panels  [x] Standard panel (40 tests)  [x] Preservatives & Antimicrobials (31 tests)  [x] Emulsifiers & Additives (25 tests)   [] Perfumes/Flavours & Plants (25 tests)  [] Hairdresser panel (12 tests)  [] Rubber Chemicals (22 tests)  [] Plastics (26 tests)  [] Colorants/Dyes/Food additives (20  tests)  [] Metals (implants/dental) (23 tests)  [] Local anaesthetics/NSAIDs (12 tests)  [] Antibiotics & Antimycotics (14 tests)   [x] Corticosteroids (15 tests)   [] Photopatch test (32 tests)   [] others: ...      [] Patient's own products: ...    DO NOT test if chemical or biological identity is unknown!     always ask from patient the product information and safety sheets (MSDS)     [] Patient needs consultation with Allergy team (changes of tests may apply)  [x] Tests discussed with Allergy team (can have direct appointment for patch tests)    RESULTS & EVALUATION of PATCH TESTS    Date/time of application:  Physician/Nurse:  / Felicia Ceballos LPN               Localization of application: Back  --> on lower back chronic eczema, but upper back can be used for patch tests. Rather dry there, but no acute eczema    Patch test readings after     [x] 2 days, [] 3 days [x] 4 days, [] 5 days,   Other duration: ...    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 Pteronyssinus --> [facility]    --> Plan in future prick tests with HDM and moulds    ==> Treatment prescribed/Plan:  --> if all tests negatives and no improvement after stopping medications, then maybe plan use of  Dupixent.          Again, thank you for allowing me to participate in the care of your patient.      Sincerely,    Leon Lanier MD

## 2018-10-01 NOTE — TELEPHONE ENCOUNTER
Results not received. [facility] and they state that [facility] will be faxed and patient is okay to be seen.

## 2018-10-02 NOTE — NURSING NOTE
Patient had 110 patch tests placed this visit.    Standard panel (40 tests)    Preservatives & Antimicrobials (30 tests)    Emulsifiers & Additives (25 tests)     Corticosteroids (15 tests)

## 2018-10-03 ENCOUNTER — OFFICE VISIT (OUTPATIENT)
Dept: DERMATOLOGY | Facility: CLINIC | Age: 68
End: 2018-10-03
Payer: COMMERCIAL

## 2018-10-03 DIAGNOSIS — L23.0 ALLERGIC CONTACT DERMATITIS DUE TO METALS: Primary | ICD-10-CM

## 2018-10-03 ASSESSMENT — PAIN SCALES - GENERAL: PAINLEVEL: NO PAIN (0)

## 2018-10-03 NOTE — PROGRESS NOTES
[facility] Dermatology Note      Dermatology Problem List:    Specialty Problems     None          CC:   Allergic Reaction (Mike is here for patch testing day 3)      Encounter Date: Oct 3, 2018    History of Present Illness:  Mr. Mike Naidu is a 68 year old male who presents as a referral from [place].    4th of July 3 years ago the itching started out of the blue. Since then every day itching.  In May 2018 in [facility] normal bone marrow exam (no signs for Lymphoma)  However, in gene rearrangement analysis some suspicion for monoclonal T-cell population  Since 2015 Methotrexate, UVB with no improvement  In Histology in [place] chronic dermatitis with eosinophilic spongiosis (2018)    Medications: Atorvastatin since about 3 years; Metoprolol since 10 years, Pantoprazole since 12 years.     Past Medical History:   There is no problem list on file for this patient.    History reviewed. No pertinent past medical history.    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 No pertinent family history.  Brothers daughter has atopic eczema  Patients own kids (5) and grand kids ==> on daughter had as child eczema and her son as well    Medications:  Current Outpatient Prescriptions   Medication Sig Dispense Refill     atorvastatin (LIPITOR) 80 MG tablet        cetirizine (ZYRTEC) 10 MG tablet        doxepin (SINEQUAN) 50 MG capsule        FLOMAX 0.4 MG capsule        folic acid (FOLVITE) 1 MG tablet        gabapentin (NEURONTIN) 300 MG  capsule        hydrOXYzine (ATARAX) 25 MG tablet        pantoprazole (PROTONIX) 40 MG EC tablet        RANITIDINE 150 MAX STRENGTH 150 MG tablet        triamcinolone (KENALOG) 0.1 % cream          Review of Systems:  -As per HPI  -Constitutional: The patient denies fatigue, fevers, chills, unintended weight loss, and night sweats.  -HEENT: Patient denies nonhealing oral sores.  -Skin: As above in HPI. No additional skin concerns.    Physical exam:  Vitals: There were no vitals taken for this visit.  GEN: This is a well developed, well-nourished male in no acute distress, in a pleasant mood.    SKIN: Total skin excluding the undergarment areas was performed. The exam included the head/face, neck, both arms, chest, back, abdomen, both legs, digits and/or nails.   Subacute to chronic eczema on extremities and as well erythema on trunk. Generally dry skin and on extremities scratch marks and prurigo-like lesions. Over ears subacute eczemas with swelling and as well front and scalp with itch and desquamation    -No other lesions of concern on areas examined.         Order for PATCH TESTS    [] Outpatient  [] Inpatient: [facility]..../ Bed ....      Skin Atopy (atopic dermatitis) [x] Yes   [] No  Rhinitis/Sinusitis:   [x] Yes   [] No  Allergic Asthma:   [] Yes   [x] No  Food Allergy:   [] Yes   [x] No  Leg ulcers:   [] Yes   [x] No  Hand eczema:   [x] Yes   [] No   Leading hand:   [x] R   [] L       [] Ambidextrous                        Reason for tests (suspected allergy): generalized subacute to chronic eczema with spongiosis and peripheral and skin Eosinophilia  Known previous allergies: Sulphonamides, Valproic acid and Procaine    Standardized panels  [x] Standard panel (40 tests)  [x] Preservatives & Antimicrobials (31 tests)  [x] Emulsifiers & Additives (25 tests)   [] Perfumes/Flavours & Plants (25 tests)  [] Hairdresser panel (12 tests)  [] Rubber Chemicals (22 tests)  [] Plastics (26 tests)  [] Colorants/Dyes/Food  additives (20 tests)  [] Metals (implants/dental) (23 tests)  [] Local anaesthetics/NSAIDs (12 tests)  [] Antibiotics & Antimycotics (14 tests)   [x] Corticosteroids (15 tests)   [] Photopatch test (32 tests)   [] others: ...      [] Patient's own products: ...    DO NOT test if chemical or biological identity is unknown!     always ask from patient the product information and safety sheets (MSDS)     [] Patient needs consultation with Allergy team (changes of tests may apply)  [x] Tests discussed with Allergy team (can have direct appointment for patch tests)    RESULTS & EVALUATION of PATCH TESTS    Date/time of application:  Physician/Nurse:  / Felicia Ceballos LPN               Localization of application: Back --> on lower back chronic eczema, but upper back can be used for patch tests. Rather dry there, but no acute eczema    Patch test readings after     [x] 2 days, [] 3 days [x] 4 days, [] 5 days,   Other duration: ...    Applied patch tests with results (import here the list of patch tests):  Date/time of application:10/01/18   Physician/Nurse:  / Felicia Ceballos LPN               Localization of application: Back     STANDARD Series         No Substance 2 days 4 days remarks   1 Jhony Mix [C] - -    2 Colophony - -    3  2-Mercaptobenzothiazole  - -     4 Methylisothiazolinone - -    5 Carba Mix - -    6 Thiurma Mix [A] - -    7 Bisphenol A Epoxy Resin - -    8 M-Qxrf-Bxpyagmurts-Formaldehyde Resin - -    9 Mercapto Mix [A] - -    10 Black Rubber Mix- PPD [B] - -    11 Potassium Dichromate  +  - Small erosion   12 Balsam of [place] (Myroxylon Pereirae Resin) - -    13 Nickel Sulphate Hexahydrate +/++ -    14 Mixed Dialkyl Thiourea - -    15 Paraben Mix [B] (+) -    16 Methyldibromo Glutaronitrile - -    17 Fragrance Mix - -    18 2-Bromo-2-Nitropropane-1,3-Diol (Bronopol) - -    19 Lyral - -    20 Tixocortol-21- Pivalate - -    21 Diazolidiyl Urea (Germall II) - -    22 Methyl  Methacrylate - -    23 Cobalt (II) Chloride Hexahydrate + -    24 Fragrance Mix II  - -    25 Compositae Mix - -    26 Benzoyl Peroxide - -    27 Bacitracin - -    28 Formaldehyde - -    29 Methylchloroisothiazolinone / Methylisothiazolinone - -    30 Corticosteroid Mix - -    31 Sodium Lauryl Sulfate - -    32 Lanolin Alcohol - -    33 Turpentine - -    34 Cetylstearylalcohol - -    35 Chlorhexidine Dicluconate - -    36 Budenoside - -    37 Imidazolidinyl Urea  - -    38 Ethyl-2 Cyanoacrylate - -    39 Quaternium 15 (Dowicil 200) - -    40 Decyl Glucoside - -      PRESERVATIVES & ANTIMICROBIALS         No Substance 2 days 4 days remarks   41  1,2-Benzisothiazoline-3-One, Sodium Salt - -    42  1,3,5-Antwan (2-Hydroxyethyl) - Hexahydrotriazine (Grotan BK) - -    43 2-Vkvcseklnlurj-8-Nitro-1, 3-Propanediol - -    44  3, 4, 4' - Triclocarban - -    45 4 - Chloro - 3 - Cresol - -    46 4 - Chloro - 4 - Xylenol (PCMX) - -    47 7-Ethylbicyclooxazolidine (Bioban [id number]) - -    48 Benzalkonium Chloride - -    49 Benzyl Alcohol - -    50 Cetalkonium Chloride - -    51 Cetylpyrimidine Chloride  - -    NA Chloroacetamide NA NA    52 DMDM Hydantoin - -    53 Glutaraldehyde - -    54 Triclosan - -    55 Glyoxal Trimeric Dihydrate (+) -    56 Iodopropynyl Butylcarbamate - -    57 Octylisothiazoline - -    58 Iodoform - -    59 (Nitrobutyl) Morpholine/(Ethylnitro-Trimethylene) Dimorpholine (Bioban P 1487) - -    60 Phenoxyethanol - -    61 Phenyl Salicylate - -    62 Povidone Iodine - -    63 Sodium Benzoate - -    64 Sodium Disulfite - -    65 Sorbic Acid - -    66 Thimerosal - -     Parabens      67 Butyl-P-Hydroxybenzoate - -    68 Ethyl-P-Hydroxybenzoate - -    69 Methyl-P-Hydroxybenzoate - -    70 Propyl-P-Hydroxybenzoate - -      EMULSIFIERS & ADDITIVES        No Substance 2 days 4 days remarks   71 Polyethylene Glycol-400 (+) -    72 Cocamidopropyl Betaine - -    73 Amerchol L101 - -    74 Propylene Glycol - -    75  Triethanolamine - -    76 Sorbitane Sesquiolate - -    77 Isopropylmyristate (+) -    78 Polysorbate 80  - -    79 Amidoamine   (Stearamidopropyl Dimethylamine) - -    80 Oleamidopropyl Dimethylamine - -    81 Lauryl Glucoside - -    82 Coconut Diethanolamide  - -    83 2-Hydroxy-4-Methoxy Benzophenone (Oxybenzone) - -    84 Benzophenone-4 (Sulisobenzon) - -    85 Propolis - -    86 Dexpanthenol - -    87 Carboxymethyl Cellulose Sodium - -    88 Abitol - -    89 Tert-Butylhydroquinone - -    90 Benzyl Salicylate - -     Antioxidant      91 Dodecyl Gallate - -    92 Butylhydroxyanisole (BHA) - -    93 Butylhydroxytoluene (BHT) - -    94 Di-Alpha-Tocopherol (Vit E) - -    95 Propyl Gallate - -          CORTICOSTEROIDS    No Substance 2 days 4 days remarks Allergy  class   96 Amcinonide - -  B   97 Betametasone-17,21 Dipropionate - -  D1   98 Desoximetasone - -  C   99 Betamethasone-17-Valerate - -  D1   100 Dexamethasone - -  C   101 Hydrocortisone - -  A   102 Clobetasol-17-Propionate - -  D1   103 Dexamethasone-21-Phosphate Disodium Salt - -  C   104 Hydrocortisone-17 Butyrate - -  D2   105 Prednisolone - -  A   106 Mometason Furoate - -  D1   107 Triamcinolone Acetonide - -  B   108 Methylprednisolone Aceponate - -  D2   109 Hydrocortisone-21-Acetate - -  A   110 Prednicarbate - -  D2       Group Characteristics of group Generic name Name  cross reactions   A Hydrocortisone   Cloprednole, Fludrocortisone acétate, Hydrocortison acetate, Methylprednisolone, Prednisolone, Tixocortolpivalate Alfacortone, Fucidin H, Dermacalm, Hexacortone, Premandole, Imacort With group D2   B Triamcinolone-acetonide   Budenoside (R-isomer), Amcinonide, Desonide, Fluocinolone acetonide, Triamcinolone acetonide Locapred, Locatop  Synalar, Pevisone, Kenacort -   C Betamethasone (Without Romina)   Betamethasone, Dexamethasone, Flumethasone pivalate, Halomethasone Daivobet, Dexasalyl, Locasalen,   -   D1 Betamethasone-diproprionate    Betamethasone dipropionate, Betamethasone-17-valerate, Clobetasole-propionate, Fluticasone propionate, Mometasone furoate Betnovate, Diprogenta, Diprosalic, Diprosone, Celestoderm, Fucicort,  Cutivate, Axotide, Elocom -   D2 Methylprednisolone-aceponate   Hydrocortisone-aceponate, Hydrocortisone-buteprate, Hydrocortisone-17-butyrate, Methylprednisolone aceponate, Prednicarbate Locoïd, Advantan,  Prednitop With group A and Budesonide (S-isomer)             Results of patch tests:                         Interpretation:    - Negative                    A    = Allergic      (+) Erythema    TI   = Toxic/irritant   + E + Infiltration    RaP = Relevance at Present     ++ E/I + Papulovesicle   Rpr  = Relevance Previously     +++ E/I/P + Blister     nR   = No Relevance      [] No relevant allergic reaction observed    [x] Allergic reaction diagnosed against: against metals (Cobalt, Nickel and chromate) and as well angry back --> could go well with atopic dermatitis     Interpretation/ remarks:   First of all, after removal patient has angry back (see photo)    [] Patient information given   [] ACSD information   [] SmartPractice information    ==> final Diagnosis:    - Generalized, 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 Pteronyssinus --> [facility]    --> Plan in future prick tests with HDM and moulds    ==> Treatment prescribed/Plan:  --> if all tests negatives and no improvement after stopping medications, then maybe plan use of Dupixent.   --> Hydroxyzine 25mg evening and Cetirizine morning and if necessary Noon

## 2018-10-03 NOTE — MR AVS SNAPSHOT
After Visit Summary   10/3/2018    Mike Naidu    MRN: [id number]           Patient Information     Date Of Birth          1950        Visit Information        Provider Department      10/3/2018 3:45 PM Leon Lanier MD [facility] Dermatology        Today's Diagnoses     Allergic contact dermatitis due to metals    -  1       Follow-ups after your visit        Your next 10 appointments already scheduled     Oct 03, 2018  3:45 PM CDT   (Arrive by 3:30 PM)   Return Allergy with MD MADELINE Brown [facility] Dermatology ([facility])    [address] [place] [id number] [phone]            Oct 05, 2018  1:30 PM CDT   (Arrive by 1:15 PM)   Return Allergy with MD MADELINE Brown [facility] Dermatology ([facility])    [address] [place] [id number] [phone]              Who to contact     Please call your clinic at [phone] to:    Ask questions about your health    Make or cancel appointments    Discuss your medicines    Learn about your test results    Speak to your doctor            Additional Information About Your Visit        MyChart Information     [employer] is an electronic gateway that provides easy, online access to your medical records. With [employer], you can request a clinic appointment, read your test results, renew a prescription or communicate with your care team.     To sign up for Pep[employer] visit the website at [email or web address]   You will be asked to enter the access code listed below, as well as some personal information. Please follow the directions to create your username and password.     Your access code is: [email or web address]  Expires: 2018  6:31 AM     Your access code will  in 90 days. If you need help or a new code, please contact your [facility] or call [phone] for assistance.        Care EveryWhere  ID     This is your Care EveryWhere ID. This could be used by other organizations to access your [facility] medical records  [id number]         Blood Pressure from Last 3 Encounters:   No data found for BP    Weight from Last 3 Encounters:   No data found for Wt              Today, you had the following     No orders found for display       Primary Care Provider    None Specified       No primary provider on file.        Equal Access to Services     [facility]: Hadii aad ku hadasho Soomaali, waaxda luqadaha, qaybta kaalmada adeegyada, nanette tiwariin jessican ademilad tang claudia . So [facility] [phone].    ATENCIÓN: Si habla español, tiene a [place] disposición servicios gratuitos de asistencia lingüística. Llame al [phone].    We comply with applicable federal civil rights laws and [place] laws. We do not discriminate on the basis of race, color, national origin, age, disability, sex, sexual orientation, or gender identity.            Thank you!     Thank you for choosing [facility] DERMATOLOGY  for your care. Our goal is always to provide you with excellent care. Hearing back from our patients is one way we can continue to improve our services. Please take a few minutes to complete the written survey that you may receive in the mail after your visit with us. Thank you!             Your Updated Medication List - Protect others around you: Learn how to safely use, store and throw away your medicines at www.disposemymeds.org.          This list is accurate as of 10/3/18  3:39 PM.  Always use your most recent med list.                   Brand Name Dispense Instructions for use Diagnosis    atorvastatin 80 MG tablet    LIPITOR          cetirizine 10 MG tablet    zyrTEC          doxepin 50 MG capsule    SINEquan          FLOMAX 0.4 MG capsule   Generic drug:  tamsulosin           folic acid 1 MG tablet    FOLVITE          gabapentin 300 MG capsule    NEURONTIN          hydrOXYzine 25 MG tablet    ATARAX           pantoprazole 40 MG EC tablet    PROTONIX          RANITIDINE 150 MAX STRENGTH 150 MG tablet   Generic drug:  ranitidine           triamcinolone 0.1 % cream    KENALOG

## 2018-10-03 NOTE — LETTER
10/3/2018       RE: Mike Naidu  [address] [address] [facility] [address] [facility] [address]     Dear Colleague,    Thank you for referring your patient, Mike Naidu, to the [facility] DERMATOLOGY at [facility]. Please see a copy of my visit note below.    [facility] Dermatology Note      Dermatology Problem List:    Specialty Problems     None          CC:   Allergic Reaction (Mike is here for patch testing day 3)      Encounter Date: Oct 3, 2018    History of Present Illness:  Mr. Mike Naidu is a 68 year old male who presents as a referral from [place].    4th of July 3 years ago the itching started out of the blue. Since then every day itching.  In May 2018 in [facility] normal bone marrow exam (no signs for Lymphoma)  However, in gene rearrangement analysis some suspicion for monoclonal T-cell population  Since 2015 Methotrexate, UVB with no improvement  In Histology in [place] chronic dermatitis with eosinophilic spongiosis (2018)    Medications: Atorvastatin since about 3 years; Metoprolol since 10 years, Pantoprazole since 12 years.     Past Medical History:   There is no problem list on file for this patient.    History reviewed. No pertinent past medical history.    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 Patient has the following hobbies or non-occupational exposure: fishing and grand kids      Family History:  History reviewed. No pertinent family history.  Brothers daughter has atopic eczema  Patients own kids (5) and grand kids ==> on daughter had as child eczema and her son as  well    Medications:  Current Outpatient Prescriptions   Medication Sig Dispense Refill     atorvastatin (LIPITOR) 80 MG tablet        cetirizine (ZYRTEC) 10 MG tablet        doxepin (SINEQUAN) 50 MG capsule        FLOMAX 0.4 MG capsule        folic acid (FOLVITE) 1 MG tablet        gabapentin (NEURONTIN) 300 MG capsule        hydrOXYzine (ATARAX) 25 MG tablet        pantoprazole (PROTONIX) 40 MG EC tablet        RANITIDINE 150 MAX STRENGTH 150 MG tablet        triamcinolone (KENALOG) 0.1 % cream          Review of Systems:  -As per HPI  -Constitutional: The patient denies fatigue, fevers, chills, unintended weight loss, and night sweats.  -HEENT: Patient denies nonhealing oral sores.  -Skin: As above in HPI. No additional skin concerns.    Physical exam:  Vitals: There were no vitals taken for this visit.  GEN: This is a well developed, well-nourished male in no acute distress, in a pleasant mood.    SKIN: Total skin excluding the undergarment areas was performed. The exam included the head/face, neck, both arms, chest, back, abdomen, both legs, digits and/or nails.   Subacute to chronic eczema on extremities and as well erythema on trunk. Generally dry skin and on extremities scratch marks and prurigo-like lesions. Over ears subacute eczemas with swelling and as well front and scalp with itch and desquamation    -No other lesions of concern on areas examined.         Order for PATCH TESTS    [] Outpatient  [] Inpatient: [place]..../ Bed ....      Skin Atopy (atopic dermatitis) [x] Yes   [] No  Rhinitis/Sinusitis:   [x] Yes   [] No  Allergic Asthma:   [] Yes   [x] No  Food Allergy:   [] Yes   [x] No  Leg ulcers:   [] Yes   [x] No  Hand eczema:   [x] Yes   [] No   Leading hand:   [x] R   [] L       [] Ambidextrous                        Reason for tests (suspected allergy): generalized subacute to chronic eczema with spongiosis and peripheral and skin Eosinophilia  Known previous allergies: Sulphonamides, Valproic acid  and Procaine    Standardized panels  [x] Standard panel (40 tests)  [x] Preservatives & Antimicrobials (31 tests)  [x] Emulsifiers & Additives (25 tests)   [] Perfumes/Flavours & Plants (25 tests)  [] Hairdresser panel (12 tests)  [] Rubber Chemicals (22 tests)  [] Plastics (26 tests)  [] Colorants/Dyes/Food additives (20 tests)  [] Metals (implants/dental) (23 tests)  [] Local anaesthetics/NSAIDs (12 tests)  [] Antibiotics & Antimycotics (14 tests)   [x] Corticosteroids (15 tests)   [] Photopatch test (32 tests)   [] others: ...      [] Patient's own products: ...    DO NOT test if chemical or biological identity is unknown!     always ask from patient the product information and safety sheets (MSDS)     [] Patient needs consultation with Allergy team (changes of tests may apply)  [x] Tests discussed with Allergy team (can have direct appointment for patch tests)    RESULTS & EVALUATION of PATCH TESTS    Date/time of application:  Physician/Nurse:  / Felicia Ceballos LPN               Localization of application: Back --> on lower back chronic eczema, but upper back can be used for patch tests. Rather dry there, but no acute eczema    Patch test readings after     [x] 2 days, [] 3 days [x] 4 days, [] 5 days,   Other duration: ...    Applied patch tests with results (import here the list of patch tests):  Date/time of application:10/01/18   Physician/Nurse:  / Felicia Ceballos LPN               Localization of application: Back     STANDARD Series         No Substance 2 days 4 days remarks   1 Jhony Mix [C] - -    2 Colophony - -    3  2-Mercaptobenzothiazole  - -     4 Methylisothiazolinone - -    5 Carba Mix - -    6 Thiurma Mix [A] - -    7 Bisphenol A Epoxy Resin - -    8 B-Gphp-Lyezcpyzctr-Formaldehyde Resin - -    9 Mercapto Mix [A] - -    10 Black Rubber Mix- PPD [B] - -    11 Potassium Dichromate  +  - Small erosion   12 Balsam of [place] (Myroxylon Pereirae Resin) - -    13 Nickel  Sulphate Hexahydrate +/++ -    14 Mixed Dialkyl Thiourea - -    15 Paraben Mix [B] (+) -    16 Methyldibromo Glutaronitrile - -    17 Fragrance Mix - -    18 2-Bromo-2-Nitropropane-1,3-Diol (Bronopol) - -    19 Lyral - -    20 Tixocortol-21- Pivalate - -    21 Diazolidiyl Urea (Germall II) - -    22 Methyl Methacrylate - -    23 Cobalt (II) Chloride Hexahydrate + -    24 Fragrance Mix II  - -    25 Compositae Mix - -    26 Benzoyl Peroxide - -    27 Bacitracin - -    28 Formaldehyde - -    29 Methylchloroisothiazolinone / Methylisothiazolinone - -    30 Corticosteroid Mix - -    31 Sodium Lauryl Sulfate - -    32 Lanolin Alcohol - -    33 Turpentine - -    34 Cetylstearylalcohol - -    35 Chlorhexidine Dicluconate - -    36 Budenoside - -    37 Imidazolidinyl Urea  - -    38 Ethyl-2 Cyanoacrylate - -    39 Quaternium 15 (Dowicil 200) - -    40 Decyl Glucoside - -      PRESERVATIVES & ANTIMICROBIALS         No Substance 2 days 4 days remarks   41  1,2-Benzisothiazoline-3-One, Sodium Salt - -    42  1,3,5-Antwan (2-Hydroxyethyl) - Hexahydrotriazine (Grotan BK) - -    43 0-Zkncfuewyemgq-5-Nitro-1, 3-Propanediol - -    44  3, 4, 4' - Triclocarban - -    45 4 - Chloro - 3 - Cresol - -    46 4 - Chloro - 4 - Xylenol (PCMX) - -    47 7-Ethylbicyclooxazolidine (Bioban RP9031) - -    48 Benzalkonium Chloride - -    49 Benzyl Alcohol - -    50 Cetalkonium Chloride - -    51 Cetylpyrimidine Chloride  - -    NA Chloroacetamide NA NA    52 DMDM Hydantoin - -    53 Glutaraldehyde - -    54 Triclosan - -    55 Glyoxal Trimeric Dihydrate (+) -    56 Iodopropynyl Butylcarbamate - -    57 Octylisothiazoline - -    58 Iodoform - -    59 (Nitrobutyl) Morpholine/(Ethylnitro-Trimethylene) Dimorpholine (Bioban P 1487) - -    60 Phenoxyethanol - -    61 Phenyl Salicylate - -    62 Povidone Iodine - -    63 Sodium Benzoate - -    64 Sodium Disulfite - -    65 Sorbic Acid - -    66 Thimerosal - -     Parabens      67 Butyl-P-Hydroxybenzoate -  -    68 Ethyl-P-Hydroxybenzoate - -    69 Methyl-P-Hydroxybenzoate - -    70 Propyl-P-Hydroxybenzoate - -      EMULSIFIERS & ADDITIVES        No Substance 2 days 4 days remarks   71 Polyethylene Glycol-400 (+) -    72 Cocamidopropyl Betaine - -    73 Amerchol L101 - -    74 Propylene Glycol - -    75 Triethanolamine - -    76 Sorbitane Sesquiolate - -    77 Isopropylmyristate (+) -    78 Polysorbate 80  - -    79 Amidoamine   (Stearamidopropyl Dimethylamine) - -    80 Oleamidopropyl Dimethylamine - -    81 Lauryl Glucoside - -    82 Coconut Diethanolamide  - -    83 2-Hydroxy-4-Methoxy Benzophenone (Oxybenzone) - -    84 Benzophenone-4 (Sulisobenzon) - -    85 Propolis - -    86 Dexpanthenol - -    87 Carboxymethyl Cellulose Sodium - -    88 Abitol - -    89 Tert-Butylhydroquinone - -    90 Benzyl Salicylate - -     Antioxidant      91 Dodecyl Gallate - -    92 Butylhydroxyanisole (BHA) - -    93 Butylhydroxytoluene (BHT) - -    94 Di-Alpha-Tocopherol (Vit E) - -    95 Propyl Gallate - -          CORTICOSTEROIDS    No Substance 2 days 4 days remarks Allergy  class   96 Amcinonide - -  B   97 Betametasone-17,21 Dipropionate - -  D1   98 Desoximetasone - -  C   99 Betamethasone-17-Valerate - -  D1   100 Dexamethasone - -  C   101 Hydrocortisone - -  A   102 Clobetasol-17-Propionate - -  D1   103 Dexamethasone-21-Phosphate Disodium Salt - -  C   104 Hydrocortisone-17 Butyrate - -  D2   105 Prednisolone - -  A   106 Mometason Furoate - -  D1   107 Triamcinolone Acetonide - -  B   108 Methylprednisolone Aceponate - -  D2   109 Hydrocortisone-21-Acetate - -  A   110 Prednicarbate - -  D2       Group Characteristics of group Generic name Name  cross reactions   A Hydrocortisone   Cloprednole, Fludrocortisone acétate, Hydrocortison acetate, Methylprednisolone, Prednisolone, Tixocortolpivalate Alfacortone, Fucidin H, Dermacalm, Hexacortone, Premandole, Imacort With group D2   B Triamcinolone-acetonide   Budenoside  (R-isomer), Amcinonide, Desonide, Fluocinolone acetonide, Triamcinolone acetonide Locapred, Locatop  Synalar, Pevisone, Kenacort -   C Betamethasone (Without Romina)   Betamethasone, Dexamethasone, Flumethasone pivalate, Halomethasone Daivobet, Dexasalyl, Locasalen,   -   D1 Betamethasone-diproprionate   Betamethasone dipropionate, Betamethasone-17-valerate, Clobetasole-propionate, Fluticasone propionate, Mometasone furoate Betnovate, Diprogenta, Diprosalic, Diprosone, Celestoderm, Fucicort,  Cutivate, Axotide, Elocom -   D2 Methylprednisolone-aceponate   Hydrocortisone-aceponate, Hydrocortisone-buteprate, Hydrocortisone-17-butyrate, Methylprednisolone aceponate, Prednicarbate Locoïd, Advantan,  Prednitop With group A and Budesonide (S-isomer)             Results of patch tests:                         Interpretation:    - Negative                    A    = Allergic      (+) Erythema    TI   = Toxic/irritant   + E + Infiltration    RaP = Relevance at Present     ++ E/I + Papulovesicle   Rpr  = Relevance Previously     +++ E/I/P + Blister     nR   = No Relevance      [] No relevant allergic reaction observed    [x] Allergic reaction diagnosed against: against metals (Cobalt, Nickel and chromate) and as well angry back --> could go well with atopic dermatitis     Interpretation/ remarks:   First of all, after removal patient has angry back (see photo)    [] Patient information given   [] ACSD information   [] SmartP[employer] information    ==> final Diagnosis:    - Generalized, 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 Pteronyssinus -->  [place]?    --> Plan in future prick tests with HDM and moulds    ==> Treatment prescribed/Plan:  --> if all tests negatives and no improvement after stopping medications, then maybe plan use of Dupixent.   --> Hydroxyzine 25mg evening and Cetirizine morning and if necessary Noon         Again, thank you for allowing me to participate in the care of your patient.      Sincerely,    Leon Lanier MD

## 2018-10-03 NOTE — NURSING NOTE
Rooming Note    Mike Naidu's goals for this visit include:   Chief Complaint   Patient presents with     Allergic Reaction     Mike is here for patch testing day 3     Felicia Ceballos LPN

## 2018-10-05 ENCOUNTER — OFFICE VISIT (OUTPATIENT)
Dept: DERMATOLOGY | Facility: CLINIC | Age: 68
End: 2018-10-05
Payer: COMMERCIAL

## 2018-10-05 ENCOUNTER — TELEPHONE (OUTPATIENT)
Dept: DERMATOLOGY | Facility: CLINIC | Age: 68
End: 2018-10-05

## 2018-10-05 DIAGNOSIS — L20.81 ATOPIC NEURODERMATITIS: Primary | ICD-10-CM

## 2018-10-05 RX ORDER — PREDNISONE 20 MG/1
TABLET ORAL
Qty: 22 TABLET | Refills: 0 | Status: SHIPPED | OUTPATIENT
Start: 2018-10-05 | End: 2021-01-06

## 2018-10-05 RX ORDER — EMOLLIENT BASE
CREAM (GRAM) TOPICAL PRN
Qty: 453 G | Refills: 8 | Status: SHIPPED | OUTPATIENT
Start: 2018-10-05 | End: 2019-11-29

## 2018-10-05 NOTE — LETTER
October 5, 2018      Mike Naidu  [address] [address] [facility] [address]              To whom it may concern,    Thank you for referring your patient, Mike Naidu, for allergy testing. They were seen on 10/1/18, 10/3/18, and 10/5/18 for patch testing. The below compounds were tested. Please see below for my interpretation and a list of tests preformed.       Final Diagnosis   First of all, after removal patient has angry back (see photo). Based on that the results are difficult to obtain. However, all the reactions were well defined erosions without infiltration and therefore rather irritant reactions with underlying atopic dermatitis than real allergic reactions.  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 Pteronyssinus --> [place]?    Plan in future prick tests with HDM and moulds    Treatment/Plan    Because no real convincing reaction in patch tests (mostly irritant with underlying atopic dermatitis) I propose treatment with Dupixent (600mg s.c. Initially and then every other week 300mg)    very dry skin and therefore use 2-3 times per day on entire body Vanicream cream (without parabens) and use free&clear shampoo and soap (patient is easily irritated)    until Dupixent can be given I will prescribe to patient now  Prednisone p.o. Starting at 50mg per day (morning) and reducing every day by 5mg                  Hydroxyzine 25mg evening and Cetirizine morning and if necessary Noon    Positive Reactions:  against metals (Cobalt, Nickel and chromate) and as well angry back --> could go well with atopic dermatitis   ==> the reactions are all erosions and not typical allergic, infiltrated lesions.    Tested Products  (Standard) Jhony Mix [C], Colophony, 2-Mercaptobenzothiazole, Methylisothiazolinone, Carba Mix, Thiuram Mix [A], Bisphenol A Epoxy Resin, I-Grmh-Sokvjluyowr Formaldehyde Resin, Mercapto Mix [A], Black Rubber Mix- PPD [B], Potassium Dichromate+/+/++, Balsam of [place] (Myroxylon Pereirae Resin), Nickel Sulphate Hexahydrate+/++/++, Mixed Dialkyl Thiourea, Paraben Mix [B](+)/+, Methyldibromo Glutaro-Nitrile-/+, Fragrance mix,  2-Bromo-2-nitropropane-1,3-diol (Bronopol), Lyral, Tixocortol-21-Pivalate, Diazolidiyl Urea (Germall II), Methyl Methacrylate, Cobalt (II) Chloride Hexahydrate+/-, Fragrance Mix II, Compositae Mix, Benzoyl Peroxide, Bacitracin, Formaldehyde, Methylchloroisothiazolinone / Methylisothiazolinone, Corticoseteroid Mix, Sodium Lauryl Sulfate, Lanolin Alcohol, Oil of Turpentine, Cetylstearylalcohol, Chlorhexidine Dicluconate, Budenoside, Imidazolidinyl Urea, Ethyl Cyanoacrylate, Quaternium 15, Decyl Glucoside  (Preservatives and Antimicrobial) 1,2-benzisothiazoline-3-one, sodium salt, 1,3,5-april(2-hydroxyethyl) -hexahydrotriazine(Grotan BK), 3-dmkbyksukthge-9-nitro-1,3-propanediol, 3,4,4'-triclocarban, 4-Chloro-3-cresol ([facility]), 4-Chloro-4-xylénol (PCMX), 7-ethylbicyclooxazolidine (biopan CS 1246), Benzalkonium chloride, Benzyl alcohol, Cetalkonium chloride, Cetylpyrimidine chloride, DMDM Hydantoin, Glutaraldehyde, Triclosan, Glyoxal trimeric dihydrate(+)/+, Iodopropynyl butylcarbamate, 2-n-Octyl-4-isothiazolin-3-one, Iodoform, (Nitrobutyl) morpholine / (Ethylnitrotrimethylene) dimorpholine([facility] ),  Phenoxyethanol, Phenylsalicylate, Povidone Iodine, Sodium Benzoate, Sodium Disulfite, Sorbic Acid, Thimerosal, butyl-p-hydroxybenzoate, ethyl-p-hydroxybenzoate, methyl-p-hydroxybenzoate, Propyl-p-hydroxybenzoate  (Emulsifiers) Polyethylene glycol-400(+)/-, Cocamidopropylbetaine, Amerchol L101, Propyleneglycol, Triethanolamine, Sorbitane Sesquiolate, Isopropylmyristate(+)/-, Polysorbate 80 (Tween 80), Amidoamine (stearamido-propyl dimethylamine), Oleamidopropyl dimethylamine, Lauryl Glucoside, Coconut diethanolamide (cocamide JI), 2-hydroxy-4methoxy-benzo-phenone (sunscreen), Benzophenone-4 (sunscr), Propolis, Dexpanthenol, Carboxymethylcellulose sodium, Abitol, tert-butylhydroquinone, Benzyl salicylate (sunscreen), Dodecyl gallate, Butylhydroxyanisole (BHA), Butylhydroxytoluene (BHT), di-a-Tocopherol (Vit E), Propyl gallate  (Corticosteroids) Amcinonide, Betametasone-17,21Dipropionate, Desoximetasone, Betamethasone-17-Valerate, Dexamethasone, Hydrocortisone, Clobetasol-17-Propionate, Dexamethasone-21-Phosphate Salt, Hydrocortisone-17 Butyrate, Prednisolone, Mometason Furoate, Triamcinolone Acetonide, Methylprednisolone Aceponate, Hydrocortisone-21-Acetate, Prednicarbate            If you have any questions please call [phone]        Sincerely,    Leon Lanier MD

## 2018-10-05 NOTE — LETTER
10/5/2018       RE: Mike Naidu  [address] [facility] [address]     Dear Colleague,    Thank you for referring your patient, Mike Naidu, to the [facility] DERMATOLOGY at [facility]. Please see a copy of my visit note below.    [facility] Dermatology Note      Dermatology Problem List:    Specialty Problems     None          CC:   No chief complaint on file.      Encounter Date: Oct 5, 2018    History of Present Illness:  Mr. Mike Naidu is a 68 year old male who presents as a referral from [place].    4th of July 3 years ago the itching started out of the blue. Since then every day itching.  In May 2018 in [facility] normal bone marrow exam (no signs for Lymphoma)  However, in gene rearrangement analysis some suspicion for monoclonal T-cell population  Since 2015 Methotrexate, UVB with no improvement  In Histology in [place] chronic dermatitis with eosinophilic spongiosis (2018)    Medications: Atorvastatin since about 3 years; Metoprolol since 10 years, Pantoprazole since 12 years.     Past Medical History:   There is no problem list on file for this patient.    No past medical history on file.    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 Patient has the following hobbies or non-occupational exposure: fishing and grand kids      Family History:  No family history on file.  Brothers daughter has atopic eczema  Patients own kids (5) and grand kids ==> on daughter had as child eczema and her son as well    Medications:  Current Outpatient Prescriptions   Medication Sig Dispense Refill      atorvastatin (LIPITOR) 80 MG tablet        cetirizine (ZYRTEC) 10 MG tablet        doxepin (SINEQUAN) 50 MG capsule        FLOMAX 0.4 MG capsule        folic acid (FOLVITE) 1 MG tablet        gabapentin (NEURONTIN) 300 MG capsule        hydrOXYzine (ATARAX) 25 MG tablet        pantoprazole (PROTONIX) 40 MG EC tablet        RANITIDINE 150 MAX STRENGTH 150 MG tablet        triamcinolone (KENALOG) 0.1 % cream          Review of Systems:  -As per HPI  -Constitutional: The patient denies fatigue, fevers, chills, unintended weight loss, and night sweats.  -HEENT: Patient denies nonhealing oral sores.  -Skin: As above in HPI. No additional skin concerns.    Physical exam:  Vitals: There were no vitals taken for this visit.  GEN: This is a well developed, well-nourished male in no acute distress, in a pleasant mood.    SKIN: Total skin excluding the undergarment areas was performed. The exam included the head/face, neck, both arms, chest, back, abdomen, both legs, digits and/or nails.   Subacute to chronic eczema on extremities and as well erythema on trunk. Generally dry skin and on extremities scratch marks and prurigo-like lesions. Over ears subacute eczemas with swelling and as well front and scalp with itch and desquamation    -No other lesions of concern on areas examined.         Order for PATCH TESTS    [] Outpatient  [] Inpatient: [place]..../ Bed ....      Skin Atopy (atopic dermatitis) [x] Yes   [] No  Rhinitis/Sinusitis:   [x] Yes   [] No  Allergic Asthma:   [] Yes   [x] No  Food Allergy:   [] Yes   [x] No  Leg ulcers:   [] Yes   [x] No  Hand eczema:   [x] Yes   [] No   Leading hand:   [x] R   [] L       [] Ambidextrous                        Reason for tests (suspected allergy): generalized subacute to chronic eczema with spongiosis and peripheral and skin Eosinophilia  Known previous allergies: Sulphonamides, Valproic acid and Procaine    Standardized panels  [x] Standard panel (40 tests)  [x] Preservatives &  Antimicrobials (31 tests)  [x] Emulsifiers & Additives (25 tests)   [] Perfumes/Flavours & Plants (25 tests)  [] Hairdresser panel (12 tests)  [] Rubber Chemicals (22 tests)  [] Plastics (26 tests)  [] Colorants/Dyes/Food additives (20 tests)  [] Metals (implants/dental) (23 tests)  [] Local anaesthetics/NSAIDs (12 tests)  [] Antibiotics & Antimycotics (14 tests)   [x] Corticosteroids (15 tests)   [] Photopatch test (32 tests)   [] others: ...      [] Patient's own products: ...    DO NOT test if chemical or biological identity is unknown!     always ask from patient the product information and safety sheets (MSDS)     [] Patient needs consultation with Allergy team (changes of tests may apply)  [x] Tests discussed with Allergy team (can have direct appointment for patch tests)    RESULTS & EVALUATION of PATCH TESTS    Date/time of application:  Physician/Nurse:  / Felicia Ceballos LPN               Localization of application: Back --> on lower back chronic eczema, but upper back can be used for patch tests. Rather dry there, but no acute eczema    Patch test readings after     [x] 2 days, [] 3 days [x] 4 days, [] 5 days,   Other duration: ...    Applied patch tests with results (import here the list of patch tests):  Date/time of application:10/01/18   Physician/Nurse:  / Felicia Ceballos LPN               Localization of application: Back     STANDARD Series         No Substance 2 days 4 days remarks   1 Jhony Mix [C] - -    2 Colophony - -    3  2-Mercaptobenzothiazole  - -     4 Methylisothiazolinone - -    5 Carba Mix - -    6 Thiurma Mix [A] - -    7 Bisphenol A Epoxy Resin - -    8 D-Zqhw-Sxdztaisobi-Formaldehyde Resin - -    9 Mercapto Mix [A] - -    10 Black Rubber Mix- PPD [B] - -    11 Potassium Dichromate  + +/++ 5mm erosion   12 Balsam of [place] (Myroxylon Pereirae Resin) - -    13 Nickel Sulphate Hexahydrate +/++ ++ 14mm erosion   14 Mixed Dialkyl Thiourea - -    15 Paraben Mix  [B] (+) + 4mm erosion   16 Methyldibromo Glutaronitrile - + 4mm erosion   17 Fragrance Mix - -    18 2-Bromo-2-Nitropropane-1,3-Diol (Bronopol) - -    19 Lyral - -    20 Tixocortol-21- Pivalate - -    21 Diazolidiyl Urea (Germall II) - -    22 Methyl Methacrylate - -    23 Cobalt (II) Chloride Hexahydrate + -    24 Fragrance Mix II  - -    25 Compositae Mix - -    26 Benzoyl Peroxide - -    27 Bacitracin - -    28 Formaldehyde - -    29 Methylchloroisothiazolinone / Methylisothiazolinone - -    30 Corticosteroid Mix - -    31 Sodium Lauryl Sulfate - -    32 Lanolin Alcohol - -    33 Turpentine - -    34 Cetylstearylalcohol - -    35 Chlorhexidine Dicluconate - -    36 Budenoside - -    37 Imidazolidinyl Urea  - -    38 Ethyl-2 Cyanoacrylate - -    39 Quaternium 15 (Dowicil 200) - -    40 Decyl Glucoside - -      PRESERVATIVES & ANTIMICROBIALS         No Substance 2 days 4 days remarks   41  1,2-Benzisothiazoline-3-One, Sodium Salt - -    42  1,3,5-Antwan (2-Hydroxyethyl) - Hexahydrotriazine (Grotan BK) - -    43 9-Cpezdhszihzfr-9-Nitro-1, 3-Propanediol - -    44  3, 4, 4' - Triclocarban - -    45 4 - Chloro - 3 - Cresol - -    46 4 - Chloro - 4 - Xylenol (PCMX) - -    47 7-Ethylbicyclooxazolidine (Bioban WC5574) - -    48 Benzalkonium Chloride - -    49 Benzyl Alcohol - -    50 Cetalkonium Chloride - -    51 Cetylpyrimidine Chloride  - -    NA Chloroacetamide NA NA    52 DMDM Hydantoin - -    53 Glutaraldehyde - -    54 Triclosan - -    55 Glyoxal Trimeric Dihydrate (+) + 7mm Erosion   56 Iodopropynyl Butylcarbamate - -    57 Octylisothiazoline - -    58 Iodoform - -    59 (Nitrobutyl) Morpholine/(Ethylnitro-Trimethylene) Dimorpholine (Bioban P 1487) - -    60 Phenoxyethanol - -    61 Phenyl Salicylate - -    62 Povidone Iodine - -    63 Sodium Benzoate - -    64 Sodium Disulfite - -    65 Sorbic Acid - -    66 Thimerosal - -     Parabens      67 Butyl-P-Hydroxybenzoate - -    68 Ethyl-P-Hydroxybenzoate - -    69  Methyl-P-Hydroxybenzoate - -    70 Propyl-P-Hydroxybenzoate - -      EMULSIFIERS & ADDITIVES        No Substance 2 days 4 days remarks   71 Polyethylene Glycol-400 (+) -    72 Cocamidopropyl Betaine - -    73 Amerchol L101 - -    74 Propylene Glycol - -    75 Triethanolamine - -    76 Sorbitane Sesquiolate - -    77 Isopropylmyristate (+) -    78 Polysorbate 80  - -    79 Amidoamine   (Stearamidopropyl Dimethylamine) - -    80 Oleamidopropyl Dimethylamine - -    81 Lauryl Glucoside - -    82 Coconut Diethanolamide  - -    83 2-Hydroxy-4-Methoxy Benzophenone (Oxybenzone) - -    84 Benzophenone-4 (Sulisobenzon) - -    85 Propolis - -    86 Dexpanthenol - -    87 Carboxymethyl Cellulose Sodium - -    88 Abitol - -    89 Tert-Butylhydroquinone - -    90 Benzyl Salicylate - -     Antioxidant      91 Dodecyl Gallate - -    92 Butylhydroxyanisole (BHA) - -    93 Butylhydroxytoluene (BHT) - -    94 Di-Alpha-Tocopherol (Vit E) - -    95 Propyl Gallate - -          CORTICOSTEROIDS    No Substance 2 days 4 days remarks Allergy  class   96 Amcinonide - -  B   97 Betametasone-17,21 Dipropionate - -  D1   98 Desoximetasone - -  C   99 Betamethasone-17-Valerate - -  D1   100 Dexamethasone - -  C   101 Hydrocortisone - -  A   102 Clobetasol-17-Propionate - -  D1   103 Dexamethasone-21-Phosphate Disodium Salt - -  C   104 Hydrocortisone-17 Butyrate - -  D2   105 Prednisolone - -  A   106 Mometason Furoate - -  D1   107 Triamcinolone Acetonide - -  B   108 Methylprednisolone Aceponate - -  D2   109 Hydrocortisone-21-Acetate - -  A   110 Prednicarbate - -  D2       Group Characteristics of group Generic name Name  cross reactions   A Hydrocortisone   Cloprednole, Fludrocortisone acétate, Hydrocortison acetate, Methylprednisolone, Prednisolone, Tixocortolpivalate Alfacortone, Fucidin H, Dermacalm, Hexacortone, Premandole, Imacort With group D2   B Triamcinolone-acetonide   Budenoside (R-isomer), Amcinonide, Desonide, Fluocinolone  acetonide, Triamcinolone acetonide Locapred, Locatop  Synalar, Pevisone, [place]ort -   C Betamethasone (Without Romina)   Betamethasone, Dexamethasone, Flumethasone pivalate, Halomethasone Daivobet, Dexasalyl, Locasalen,   -   D1 Betamethasone-diproprionate   Betamethasone dipropionate, Betamethasone-17-valerate, Clobetasole-propionate, Fluticasone propionate, Mometasone furoate Betnovate, Diprogenta, Diprosalic, Diprosone, Celestoderm, Fucicort,  Cutivate, Axotide, Elocom -   D2 Methylprednisolone-aceponate   Hydrocortisone-aceponate, Hydrocortisone-buteprate, Hydrocortisone-17-butyrate, Methylprednisolone aceponate, Prednicarbate Locoïd, Advantan,  Prednitop With group A and Budesonide (S-isomer)             Results of patch tests:                         Interpretation:    - Negative                    A    = Allergic      (+) Erythema    TI   = Toxic/irritant   + E + Infiltration    RaP = Relevance at Present     ++ E/I + Papulovesicle   Rpr  = Relevance Previously     +++ E/I/P + Blister     nR   = No Relevance      [] No relevant allergic reaction observed    [x] Allergic reaction diagnosed against: against metals (Cobalt, Nickel and chromate) and as well angry back --> could go well with atopic dermatitis   ==> the reactions are all erosions and not typical allergic, infiltrated lesions.     Interpretation/ remarks:   First of all, after removal patient has angry back (see photo). Based on that the results are difficult to obtain. However, all the reactions were well defined erosions without infiltration and therefore rather irritant reactions with underlying atopic dermatitis than real allergic reactions.  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 Pteronyssinus --> done in [place]?    --> Plan in future prick tests with HDM and moulds    ==> Treatment prescribed/Plan:  --> Because no real convincing reaction in patch tests (mostly irritant with underlying atopic dermatitis) I propose treatment with Dupixent (600mg s.c. Initially and then every other week 300mg)  --> very dry skin and therefore use 2-3 times per day on entire body Vanicream cream (without parabens) and use free&clear shampoo and soap (patient is easily irritated)  --> until Dupixent can be given I will prescribe to patient now Prednisone p.o. Starting at 50mg per day (morning) and reducing every day by 5mg   --> Hydroxyzine 25mg evening and Cetirizine morning and if necessary Noon    I spent a total of 20 min face to face with Mike Naidu during today's office visit. About 50% of the time was spent counseling the patient and/or coordinating care regarding their allergy.         Leon Lanier MD

## 2018-10-05 NOTE — PROGRESS NOTES
[facility] Dermatology Note      Dermatology Problem List:    Specialty Problems     None          CC:   No chief complaint on file.      Encounter Date: Oct 5, 2018    History of Present Illness:  Mr. Mike Naidu is a 68 year old male who presents as a referral from [place].    4th of July 3 years ago the itching started out of the blue. Since then every day itching.  In May 2018 in [facility] normal bone marrow exam (no signs for Lymphoma)  However, in gene rearrangement analysis some suspicion for monoclonal T-cell population  Since 2015 Methotrexate, UVB with no improvement  In Histology in [place] chronic dermatitis with eosinophilic spongiosis (2018)    Medications: Atorvastatin since about 3 years; Metoprolol since 10 years, Pantoprazole since 12 years.     Past Medical History:   There is no problem list on file for this patient.    No past medical history on file.    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 Patient has the following hobbies or non-occupational exposure: fishing and grand kids      Family History:  No family history on file.  Brothers daughter has atopic eczema  Patients own kids (5) and grand kids ==> on daughter had as child eczema and her son as well    Medications:  Current Outpatient Prescriptions   Medication Sig Dispense Refill     atorvastatin (LIPITOR) 80 MG tablet        cetirizine (ZYRTEC) 10 MG tablet        doxepin (SINEQUAN) 50 MG capsule        FLOMAX 0.4 MG capsule        folic acid (FOLVITE) 1 MG tablet        gabapentin (NEURONTIN) 300 MG capsule        hydrOXYzine (ATARAX) 25 MG tablet        pantoprazole  (PROTONIX) 40 MG EC tablet        RANITIDINE 150 MAX STRENGTH 150 MG tablet        triamcinolone (KENALOG) 0.1 % cream          Review of Systems:  -As per HPI  -Constitutional: The patient denies fatigue, fevers, chills, unintended weight loss, and night sweats.  -HEENT: Patient denies nonhealing oral sores.  -Skin: As above in HPI. No additional skin concerns.    Physical exam:  Vitals: There were no vitals taken for this visit.  GEN: This is a well developed, well-nourished male in no acute distress, in a pleasant mood.    SKIN: Total skin excluding the undergarment areas was performed. The exam included the head/face, neck, both arms, chest, back, abdomen, both legs, digits and/or nails.   Subacute to chronic eczema on extremities and as well erythema on trunk. Generally dry skin and on extremities scratch marks and prurigo-like lesions. Over ears subacute eczemas with swelling and as well front and scalp with itch and desquamation    -No other lesions of concern on areas examined.         Order for PATCH TESTS    [] Outpatient  [] Inpatient: [facility]..../ Bed ....      Skin Atopy (atopic dermatitis) [x] Yes   [] No  Rhinitis/Sinusitis:   [x] Yes   [] No  Allergic Asthma:   [] Yes   [x] No  Food Allergy:   [] Yes   [x] No  Leg ulcers:   [] Yes   [x] No  Hand eczema:   [x] Yes   [] No   Leading hand:   [x] R   [] L       [] Ambidextrous                        Reason for tests (suspected allergy): generalized subacute to chronic eczema with spongiosis and peripheral and skin Eosinophilia  Known previous allergies: Sulphonamides, Valproic acid and Procaine    Standardized panels  [x] Standard panel (40 tests)  [x] Preservatives & Antimicrobials (31 tests)  [x] Emulsifiers & Additives (25 tests)   [] Perfumes/Flavours & Plants (25 tests)  [] Hairdresser panel (12 tests)  [] Rubber Chemicals (22 tests)  [] Plastics (26 tests)  [] Colorants/Dyes/Food additives (20 tests)  [] Metals (implants/dental) (23 tests)  [] Local  anaesthetics/NSAIDs (12 tests)  [] Antibiotics & Antimycotics (14 tests)   [x] Corticosteroids (15 tests)   [] Photopatch test (32 tests)   [] others: ...      [] Patient's own products: ...    DO NOT test if chemical or biological identity is unknown!     always ask from patient the product information and safety sheets (MSDS)     [] Patient needs consultation with Allergy team (changes of tests may apply)  [x] Tests discussed with Allergy team (can have direct appointment for patch tests)    RESULTS & EVALUATION of PATCH TESTS    Date/time of application:  Physician/Nurse:  / Felicia Ceballos LPN               Localization of application: Back --> on lower back chronic eczema, but upper back can be used for patch tests. Rather dry there, but no acute eczema    Patch test readings after     [x] 2 days, [] 3 days [x] 4 days, [] 5 days,   Other duration: ...    Applied patch tests with results (import here the list of patch tests):  Date/time of application:10/01/18   Physician/Nurse:  / Felicia Ceballos LPN               Localization of application: Back     STANDARD Series         No Substance 2 days 4 days remarks   1 Jhony Mix [C] - -    2 Colophony - -    3  2-Mercaptobenzothiazole  - -     4 Methylisothiazolinone - -    5 Carba Mix - -    6 Thiurma Mix [A] - -    7 Bisphenol A Epoxy Resin - -    8 F-Swxf-Lgpzwlmkmqn-Formaldehyde Resin - -    9 Mercapto Mix [A] - -    10 Black Rubber Mix- PPD [B] - -    11 Potassium Dichromate  + +/++ 5mm erosion   12 Balsam of [place] (Myroxylon Pereirae Resin) - -    13 Nickel Sulphate Hexahydrate +/++ ++ 14mm erosion   14 Mixed Dialkyl Thiourea - -    15 Paraben Mix [B] (+) + 4mm erosion   16 Methyldibromo Glutaronitrile - + 4mm erosion   17 Fragrance Mix - -    18 2-Bromo-2-Nitropropane-1,3-Diol (Bronopol) - -    19 Lyral - -    20 Tixocortol-21- Pivalate - -    21 Diazolidiyl Urea (Germall II) - -    22 Methyl Methacrylate - -    23 Cobalt (II) Chloride  Hexahydrate + -    24 Fragrance Mix II  - -    25 Compositae Mix - -    26 Benzoyl Peroxide - -    27 Bacitracin - -    28 Formaldehyde - -    29 Methylchloroisothiazolinone / Methylisothiazolinone - -    30 Corticosteroid Mix - -    31 Sodium Lauryl Sulfate - -    32 Lanolin Alcohol - -    33 Turpentine - -    34 Cetylstearylalcohol - -    35 Chlorhexidine Dicluconate - -    36 Budenoside - -    37 Imidazolidinyl Urea  - -    38 Ethyl-2 Cyanoacrylate - -    39 Quaternium 15 (Dowicil 200) - -    40 Decyl Glucoside - -      PRESERVATIVES & ANTIMICROBIALS         No Substance 2 days 4 days remarks   41  1,2-Benzisothiazoline-3-One, Sodium Salt - -    42  1,3,5-Antwan (2-Hydroxyethyl) - Hexahydrotriazine (Grotan BK) - -    43 9-Gcgbcofrodflx-4-Nitro-1, 3-Propanediol - -    44  3, 4, 4' - Triclocarban - -    45 4 - Chloro - 3 - Cresol - -    46 4 - Chloro - 4 - Xylenol (PCMX) - -    47 7-Ethylbicyclooxazolidine (Bioban [id number]) - -    48 Benzalkonium Chloride - -    49 Benzyl Alcohol - -    50 Cetalkonium Chloride - -    51 Cetylpyrimidine Chloride  - -    NA Chloroacetamide NA NA    52 DMDM Hydantoin - -    53 Glutaraldehyde - -    54 Triclosan - -    55 Glyoxal Trimeric Dihydrate (+) + 7mm Erosion   56 Iodopropynyl Butylcarbamate - -    57 Octylisothiazoline - -    58 Iodoform - -    59 (Nitrobutyl) Morpholine/(Ethylnitro-Trimethylene) Dimorpholine (Bioban P 1487) - -    60 Phenoxyethanol - -    61 Phenyl Salicylate - -    62 Povidone Iodine - -    63 Sodium Benzoate - -    64 Sodium Disulfite - -    65 Sorbic Acid - -    66 Thimerosal - -     Parabens      67 Butyl-P-Hydroxybenzoate - -    68 Ethyl-P-Hydroxybenzoate - -    69 Methyl-P-Hydroxybenzoate - -    70 Propyl-P-Hydroxybenzoate - -      EMULSIFIERS & ADDITIVES        No Substance 2 days 4 days remarks   71 Polyethylene Glycol-400 (+) -    72 Cocamidopropyl Betaine - -    73 Amerchol L101 - -    74 Propylene Glycol - -    75 Triethanolamine - -    76 Sorbitane  Sesquiolate - -    77 Isopropylmyristate (+) -    78 Polysorbate 80  - -    79 Amidoamine   (Stearamidopropyl Dimethylamine) - -    80 Oleamidopropyl Dimethylamine - -    81 Lauryl Glucoside - -    82 Coconut Diethanolamide  - -    83 2-Hydroxy-4-Methoxy Benzophenone (Oxybenzone) - -    84 Benzophenone-4 (Sulisobenzon) - -    85 Propolis - -    86 Dexpanthenol - -    87 Carboxymethyl Cellulose Sodium - -    88 Abitol - -    89 Tert-Butylhydroquinone - -    90 Benzyl Salicylate - -     Antioxidant      91 Dodecyl Gallate - -    92 Butylhydroxyanisole (BHA) - -    93 Butylhydroxytoluene (BHT) - -    94 Di-Alpha-Tocopherol (Vit E) - -    95 Propyl Gallate - -          CORTICOSTEROIDS    No Substance 2 days 4 days remarks Allergy  class   96 Amcinonide - -  B   97 Betametasone-17,21 Dipropionate - -  D1   98 Desoximetasone - -  C   99 Betamethasone-17-Valerate - -  D1   100 Dexamethasone - -  C   101 Hydrocortisone - -  A   102 Clobetasol-17-Propionate - -  D1   103 Dexamethasone-21-Phosphate Disodium Salt - -  C   104 Hydrocortisone-17 Butyrate - -  D2   105 Prednisolone - -  A   106 Mometason Furoate - -  D1   107 Triamcinolone Acetonide - -  B   108 Methylprednisolone Aceponate - -  D2   109 Hydrocortisone-21-Acetate - -  A   110 Prednicarbate - -  D2       Group Characteristics of group Generic name Name  cross reactions   A Hydrocortisone   Cloprednole, Fludrocortisone acétate, Hydrocortison acetate, Methylprednisolone, Prednisolone, Tixocortolpivalate Alfacortone, Fucidin H, Dermacalm, Hexacortone, Premandole, Imacort With group D2   B Triamcinolone-acetonide   Budenoside (R-isomer), Amcinonide, Desonide, Fluocinolone acetonide, Triamcinolone acetonide Locapred, Locatop  Synalar, Pevisone, Kenacort -   C Betamethasone (Without Romina)   Betamethasone, Dexamethasone, Flumethasone pivalate, Halomethasone Daivobet, Dexasalyl, Locasalen,   -   D1 Betamethasone-diproprionate   Betamethasone dipropionate,  Betamethasone-17-valerate, Clobetasole-propionate, Fluticasone propionate, Mometasone furoate Betnovate, Diprogenta, Diprosalic, Diprosone, Celestoderm, Fucicort,  Cutivate, Axotide, Elocom -   D2 Methylprednisolone-aceponate   Hydrocortisone-aceponate, Hydrocortisone-buteprate, Hydrocortisone-17-butyrate, Methylprednisolone aceponate, Prednicarbate Locoïd, Advantan,  Prednitop With group A and Budesonide (S-isomer)             Results of patch tests:                         Interpretation:    - Negative                    A    = Allergic      (+) Erythema    TI   = Toxic/irritant   + E + Infiltration    RaP = Relevance at Present     ++ E/I + Papulovesicle   Rpr  = Relevance Previously     +++ E/I/P + Blister     nR   = No Relevance      [] No relevant allergic reaction observed    [x] Allergic reaction diagnosed against: against metals (Cobalt, Nickel and chromate) and as well angry back --> could go well with atopic dermatitis   ==> the reactions are all erosions and not typical allergic, infiltrated lesions.     Interpretation/ remarks:   First of all, after removal patient has angry back (see photo). Based on that the results are difficult to obtain. However, all the reactions were well defined erosions without infiltration and therefore rather irritant reactions with underlying atopic dermatitis than real allergic reactions.  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 Pteronyssinus --> [place]?    --> Plan in future prick tests with HDM and moulds    ==> Treatment prescribed/Plan:  --> Because no real convincing reaction in patch tests (mostly irritant with underlying atopic dermatitis) I propose treatment with Dupixent (600mg s.c. Initially and then every other week 300mg)  --> very dry skin and therefore use 2-3 times per day on entire body Vanicream cream (without parabens) and use free&clear shampoo and soap (patient is easily irritated)  --> until Dupixent can be given I will prescribe to patient now Prednisone p.o. Starting at 50mg per day (morning) and reducing every day by 5mg   --> Hydroxyzine 25mg evening and Cetirizine morning and if necessary Noon    I spent a total of 20 min face to face with Mike Naidu during today's office visit. About 50% of the time was spent counseling the patient and/or coordinating care regarding their allergy.        Respiratory

## 2018-10-05 NOTE — LETTER
October 5, 2018      Mike Naidu  [address] [facility] [address]            Dear Insurance Carrier,      One of your enrolled members, Mike Naidu , has been referred by Dr.Paul Lanier, who is a dermatoallergist, for Comprehensive Patch Testing. Comprehensive patch testing is medically   necessary for this patient.    COMPREHENSIVE PATCH TESTING IS INDICATED FOR PATIENTS WHO HAVE  CHRONIC DERMATITIS THAT HAS NOT IMPROVED AFTER RECEIVING  AGGRESSIVE TREATMENT BY DERMATOLOGISTS AND/OR ALLERGISTS  AND WHICH SEVERELY IMPACTS THE INDIVIDUAL S HEALTH AND  QUALITY OF LIFE.    This patient meets the above criteria and thus requires comprehensive patch testing. This  is likely to entail more than 80 units of CPT code 47552. In addition, Dr. Lanier will spend  a substantial amount of time working to determine the cause of the dermatitis and then is  able to more precisely develop a personalized treatment plan.    This Comprehensive Patch Testing cannot be performed by most allergists or  dermatologists, who are only able to perform limited patch testing, which is inadequate or  inappropriate for the diagnosis of suspected allergy.    Please see the following document for additional information and many peer reviewed  references on the medical necessity of Comprehensive Patch Testing.

## 2018-10-05 NOTE — PATIENT INSTRUCTIONS
RESULTS & EVALUATION of PATCH TESTS    Date/time of application:  Physician/Nurse:  / Felicia Ceballos LPN               Localization of application: Back --> on lower back chronic eczema, but upper back can be used for patch tests. Rather dry there, but no acute eczema    Patch test readings after     [x] 2 days, [] 3 days [x] 4 days, [] 5 days,   Other duration: ...    Applied patch tests with results (import here the list of patch tests):  Date/time of application:10/01/18   Physician/Nurse:  / Felicia Ceballos LPN               Localization of application: Back     STANDARD Series         No Substance 2 days 4 days remarks   1 Jhony Mix [C] - -    2 Colophony - -    3  2-Mercaptobenzothiazole  - -     4 Methylisothiazolinone - -    5 Carba Mix - -    6 Thiurma Mix [A] - -    7 Bisphenol A Epoxy Resin - -    8 B-Ttnx-Wrxgflnfkfx-Formaldehyde Resin - -    9 Mercapto Mix [A] - -    10 Black Rubber Mix- PPD [B] - -    11 Potassium Dichromate  + +/++ 5mm erosion   12 Balsam of [place] (Myroxylon Pereirae Resin) - -    13 Nickel Sulphate Hexahydrate +/++ ++ 14mm erosion   14 Mixed Dialkyl Thiourea - -    15 Paraben Mix [B] (+) + 4mm erosion   16 Methyldibromo Glutaronitrile - + 4mm erosion   17 Fragrance Mix - -    18 2-Bromo-2-Nitropropane-1,3-Diol (Bronopol) - -    19 Lyral - -    20 Tixocortol-21- Pivalate - -    21 Diazolidiyl Urea (Germall II) - -    22 Methyl Methacrylate - -    23 Cobalt (II) Chloride Hexahydrate + -    24 Fragrance Mix II  - -    25 Compositae Mix - -    26 Benzoyl Peroxide - -    27 Bacitracin - -    28 Formaldehyde - -    29 Methylchloroisothiazolinone / Methylisothiazolinone - -    30 Corticosteroid Mix - -    31 Sodium Lauryl Sulfate - -    32 Lanolin Alcohol - -    33 Turpentine - -    34 Cetylstearylalcohol - -    35 Chlorhexidine Dicluconate - -    36 Budenoside - -    37 Imidazolidinyl Urea  - -    38 Ethyl-2 Cyanoacrylate - -    39 Quaternium 15 (Dowicil 200) - -     40 Decyl Glucoside - -      PRESERVATIVES & ANTIMICROBIALS         No Substance 2 days 4 days remarks   41  1,2-Benzisothiazoline-3-One, Sodium Salt - -    42  1,3,5-Antwan (2-Hydroxyethyl) - Hexahydrotriazine (Grotan BK) - -    43 4-Ygjjzgrctzppj-4-Nitro-1, 3-Propanediol - -    44  3, 4, 4' - Triclocarban - -    45 4 - Chloro - 3 - Cresol - -    46 4 - Chloro - 4 - Xylenol (PCMX) - -    47 7-Ethylbicyclooxazolidine (Bioban [id number]) - -    48 Benzalkonium Chloride - -    49 Benzyl Alcohol - -    50 Cetalkonium Chloride - -    51 Cetylpyrimidine Chloride  - -    NA Chloroacetamide NA NA    52 DMDM Hydantoin - -    53 Glutaraldehyde - -    54 Triclosan - -    55 Glyoxal Trimeric Dihydrate (+) + 7mm Erosion   56 Iodopropynyl Butylcarbamate - -    57 Octylisothiazoline - -    58 Iodoform - -    59 (Nitrobutyl) Morpholine/(Ethylnitro-Trimethylene) Dimorpholine (Bioban P 1487) - -    60 Phenoxyethanol - -    61 Phenyl Salicylate - -    62 Povidone Iodine - -    63 Sodium Benzoate - -    64 Sodium Disulfite - -    65 Sorbic Acid - -    66 Thimerosal - -     Parabens      67 Butyl-P-Hydroxybenzoate - -    68 Ethyl-P-Hydroxybenzoate - -    69 Methyl-P-Hydroxybenzoate - -    70 Propyl-P-Hydroxybenzoate - -      EMULSIFIERS & ADDITIVES        No Substance 2 days 4 days remarks   71 Polyethylene Glycol-400 (+) -    72 Cocamidopropyl Betaine - -    73 Amerchol L101 - -    74 Propylene Glycol - -    75 Triethanolamine - -    76 Sorbitane Sesquiolate - -    77 Isopropylmyristate (+) -    78 Polysorbate 80  - -    79 Amidoamine   (Stearamidopropyl Dimethylamine) - -    80 Oleamidopropyl Dimethylamine - -    81 Lauryl Glucoside - -    82 Coconut Diethanolamide  - -    83 2-Hydroxy-4-Methoxy Benzophenone (Oxybenzone) - -    84 Benzophenone-4 (Sulisobenzon) - -    85 Propolis - -    86 Dexpanthenol - -    87 Carboxymethyl Cellulose Sodium - -    88 Abitol - -    89 Tert-Butylhydroquinone - -    90 Benzyl Salicylate - -     Antioxidant       91 Dodecyl Gallate - -    92 Butylhydroxyanisole (BHA) - -    93 Butylhydroxytoluene (BHT) - -    94 Di-Alpha-Tocopherol (Vit E) - -    95 Propyl Gallate - -          CORTICOSTEROIDS    No Substance 2 days 4 days remarks Allergy  class   96 Amcinonide - -  B   97 Betametasone-17,21 Dipropionate - -  D1   98 Desoximetasone - -  C   99 Betamethasone-17-Valerate - -  D1   100 Dexamethasone - -  C   101 Hydrocortisone - -  A   102 Clobetasol-17-Propionate - -  D1   103 Dexamethasone-21-Phosphate Disodium Salt - -  C   104 Hydrocortisone-17 Butyrate - -  D2   105 Prednisolone - -  A   106 Mometason Furoate - -  D1   107 Triamcinolone Acetonide - -  B   108 Methylprednisolone Aceponate - -  D2   109 Hydrocortisone-21-Acetate - -  A   110 Prednicarbate - -  D2       Group Characteristics of group Generic name Name  cross reactions   A Hydrocortisone   Cloprednole, Fludrocortisone acétate, Hydrocortison acetate, Methylprednisolone, Prednisolone, Tixocortolpivalate Alfacortone, Fucidin H, Dermacalm, Hexacortone, Premandole, Imacort With group D2   B Triamcinolone-acetonide   Budenoside (R-isomer), Amcinonide, Desonide, Fluocinolone acetonide, Triamcinolone acetonide Locapred, Locatop  Synalar, Pevisone, Kenacort -   C Betamethasone (Without Romina)   Betamethasone, Dexamethasone, Flumethasone pivalate, Halomethasone Daivobet, Dexasalyl, Locasalen,   -   D1 Betamethasone-diproprionate   Betamethasone dipropionate, Betamethasone-17-valerate, Clobetasole-propionate, Fluticasone propionate, Mometasone furoate Betnovate, Diprogenta, Diprosalic, Diprosone, Celestoderm, Fucicort,  Cutivate, Axotide, Elocom -   D2 Methylprednisolone-aceponate   Hydrocortisone-aceponate, Hydrocortisone-buteprate, Hydrocortisone-17-butyrate, Methylprednisolone aceponate, Prednicarbate Locoïd, Advantan,  Prednitop With group A and Budesonide (S-isomer)             Results of patch tests:                         Interpretation:    - Negative                     A    = Allergic      (+) Erythema    TI   = Toxic/irritant   + E + Infiltration    RaP = Relevance at Present     ++ E/I + Papulovesicle   Rpr  = Relevance Previously     +++ E/I/P + Blister     nR   = No Relevance      [] No relevant allergic reaction observed    [x] Allergic reaction diagnosed against: against metals (Cobalt, Nickel and chromate) and as well angry back --> could go well with atopic dermatitis   ==> the reactions are all erosions and not typical allergic, infiltrated lesions.     Interpretation/ remarks:   First of all, after removal patient has angry back (see photo). Based on that the results are difficult to obtain. However, all the reactions were well defined erosions without infiltration and therefore rather irritant reactions with underlying atopic dermatitis than real allergic reactions.  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 Pteronyssinus --> [place]    --> Plan in future prick tests with HDM and moulds    ==> Treatment prescribed/Plan:  --> Because no real convincing reaction in patch tests (mostly irritant with underlying atopic dermatitis) I propose treatment with Dupixent (600mg s.c. Initially and then every other week 300mg)  --> very dry skin and therefore use 2-3 times  per day on entire body Vanicream cream (without parabens) and use free&clear shampoo and soap (patient is easily irritated)  --> until Dupixent can be given I will prescribe to patient now Prednisone p.o. Starting at 50mg per day (morning) and reducing every day by 5mg   --> Hydroxyzine 25mg evening and Cetirizine morning and if necessary Noon

## 2018-10-05 NOTE — MR AVS SNAPSHOT
After Visit Summary   10/5/2018    Mike Naidu    MRN: [id number]           Patient Information     Date Of Birth          1950        Visit Information        Provider Department      10/5/2018 1:30 PM Leon Lanier MD [facility]        Today's Diagnoses     Atopic neurodermatitis    -  1      Care Instructions    RESULTS & EVALUATION of PATCH TESTS    Date/time of application:  Physician/Nurse:  / Felicia Ceballos LPN               Localization of application: Back --> on lower back chronic eczema, but upper back can be used for patch tests. Rather dry there, but no acute eczema    Patch test readings after     [x] 2 days, [] 3 days [x] 4 days, [] 5 days,   Other duration: ...    Applied patch tests with results (import here the list of patch tests):  Date/time of application:10/01/18   Physician/Nurse:  / Felicia Ceballos LPN               Localization of application: Back     STANDARD Series         No Substance 2 days 4 days remarks   1 Jhony Mix [C] - -    2 Colophony - -    3  2-Mercaptobenzothiazole  - -     4 Methylisothiazolinone - -    5 Carba Mix - -    6 Thiurma Mix [A] - -    7 Bisphenol A Epoxy Resin - -    8 V-Wdlf-Mqwninbcdle-Formaldehyde Resin - -    9 Mercapto Mix [A] - -    10 Black Rubber Mix- PPD [B] - -    11 Potassium Dichromate  + +/++ 5mm erosion   12 Balsam of [place] (Myroxylon Pereirae Resin) - -    13 Nickel Sulphate Hexahydrate +/++ ++ 14mm erosion   14 Mixed Dialkyl Thiourea - -    15 Paraben Mix [B] (+) + 4mm erosion   16 Methyldibromo Glutaronitrile - + 4mm erosion   17 Fragrance Mix - -    18 2-Bromo-2-Nitropropane-1,3-Diol (Bronopol) - -    19 Lyral - -    20 Tixocortol-21- Pivalate - -    21 Diazolidiyl Urea (Germall II) - -    22 Methyl Methacrylate - -    23 Cobalt (II) Chloride Hexahydrate + -    24 Fragrance Mix II  - -    25 Compositae Mix - -    26 Benzoyl Peroxide - -    27 Bacitracin - -    28 Formaldehyde - -     29 Methylchloroisothiazolinone / Methylisothiazolinone - -    30 Corticosteroid Mix - -    31 Sodium Lauryl Sulfate - -    32 Lanolin Alcohol - -    33 Turpentine - -    34 Cetylstearylalcohol - -    35 Chlorhexidine Dicluconate - -    36 Budenoside - -    37 Imidazolidinyl Urea  - -    38 Ethyl-2 Cyanoacrylate - -    39 Quaternium 15 (Dowicil 200) - -    40 Decyl Glucoside - -      PRESERVATIVES & ANTIMICROBIALS         No Substance 2 days 4 days remarks   41  1,2-Benzisothiazoline-3-One, Sodium Salt - -    42  1,3,5-Antwan (2-Hydroxyethyl) - Hexahydrotriazine (Grotan BK) - -    43 0-Zdqvogdokdtwh-3-Nitro-1, 3-Propanediol - -    44  3, 4, 4' - Triclocarban - -    45 4 - Chloro - 3 - Cresol - -    46 4 - Chloro - 4 - Xylenol (PCMX) - -    47 7-Ethylbicyclooxazolidine ([employer]an WO4115) - -    48 Benzalkonium Chloride - -    49 Benzyl Alcohol - -    50 Cetalkonium Chloride - -    51 Cetylpyrimidine Chloride  - -    NA Chloroacetamide NA NA    52 DMDM Hydantoin - -    53 Glutaraldehyde - -    54 Triclosan - -    55 Glyoxal Trimeric Dihydrate (+) + 7mm Erosion   56 Iodopropynyl Butylcarbamate - -    57 Octylisothiazoline - -    58 Iodoform - -    59 (Nitrobutyl) Morpholine/(Ethylnitro-Trimethylene) Dimorpholine (Bioban P 1487) - -    60 Phenoxyethanol - -    61 Phenyl Salicylate - -    62 Povidone Iodine - -    63 Sodium Benzoate - -    64 Sodium Disulfite - -    65 Sorbic Acid - -    66 Thimerosal - -     Parabens      67 Butyl-P-Hydroxybenzoate - -    68 Ethyl-P-Hydroxybenzoate - -    69 Methyl-P-Hydroxybenzoate - -    70 Propyl-P-Hydroxybenzoate - -      EMULSIFIERS & ADDITIVES        No Substance 2 days 4 days remarks   71 Polyethylene Glycol-400 (+) -    72 Cocamidopropyl Betaine - -    73 Amerchol L101 - -    74 Propylene Glycol - -    75 Triethanolamine - -    76 Sorbitane Sesquiolate - -    77 Isopropylmyristate (+) -    78 Polysorbate 80  - -    79 Amidoamine   (Stearamidopropyl Dimethylamine) - -    80  Oleamidopropyl Dimethylamine - -    81 Lauryl Glucoside - -    82 Coconut Diethanolamide  - -    83 2-Hydroxy-4-Methoxy Benzophenone (Oxybenzone) - -    84 Benzophenone-4 (Sulisobenzon) - -    85 Propolis - -    86 Dexpanthenol - -    87 Carboxymethyl Cellulose Sodium - -    88 Abitol - -    89 Tert-Butylhydroquinone - -    90 Benzyl Salicylate - -     Antioxidant      91 Dodecyl Gallate - -    92 Butylhydroxyanisole (BHA) - -    93 Butylhydroxytoluene (BHT) - -    94 Di-Alpha-Tocopherol (Vit E) - -    95 Propyl Gallate - -          CORTICOSTEROIDS    No Substance 2 days 4 days remarks Allergy  class   96 Amcinonide - -  B   97 Betametasone-17,21 Dipropionate - -  D1   98 Desoximetasone - -  C   99 Betamethasone-17-Valerate - -  D1   100 Dexamethasone - -  C   101 Hydrocortisone - -  A   102 Clobetasol-17-Propionate - -  D1   103 Dexamethasone-21-Phosphate Disodium Salt - -  C   104 Hydrocortisone-17 Butyrate - -  D2   105 Prednisolone - -  A   106 Mometason Furoate - -  D1   107 Triamcinolone Acetonide - -  B   108 Methylprednisolone Aceponate - -  D2   109 Hydrocortisone-21-Acetate - -  A   110 Prednicarbate - -  D2       Group Characteristics of group Generic name Name  cross reactions   A Hydrocortisone   Cloprednole, Fludrocortisone acétate, Hydrocortison acetate, Methylprednisolone, Prednisolone, Tixocortolpivalate Alfacortone, Fucidin H, Dermacalm, Hexacortone, Premandole, Imacort With group D2   B Triamcinolone-acetonide   Budenoside (R-isomer), Amcinonide, Desonide, Fluocinolone acetonide, Triamcinolone acetonide Locapred, Locatop  Synalar, Pevisone, Kenacort -   C Betamethasone (Without Romina)   Betamethasone, Dexamethasone, Flumethasone pivalate, Halomethasone Daivobet, Dexasalyl, Locasalen,   -   D1 Betamethasone-diproprionate   Betamethasone dipropionate, Betamethasone-17-valerate, Clobetasole-propionate, Fluticasone propionate, Mometasone furoate Betnovate, Diprogenta, Diprosalic, Diprosone,  Celestoderm, Fucicort,  Cutivate, Axotide, Elocom -   D2 Methylprednisolone-aceponate   Hydrocortisone-aceponate, Hydrocortisone-buteprate, Hydrocortisone-17-butyrate, Methylprednisolone aceponate, Prednicarbate Locoïd, Advantan,  Prednitop With group A and Budesonide (S-isomer)             Results of patch tests:                         Interpretation:    - Negative                    A    = Allergic      (+) Erythema    TI   = Toxic/irritant   + E + Infiltration    RaP = Relevance at Present     ++ E/I + Papulovesicle   Rpr  = Relevance Previously     +++ E/I/P + Blister     nR   = No Relevance      [] No relevant allergic reaction observed    [x] Allergic reaction diagnosed against: against metals (Cobalt, Nickel and chromate) and as well angry back --> could go well with atopic dermatitis   ==> the reactions are all erosions and not typical allergic, infiltrated lesions.     Interpretation/ remarks:   First of all, after removal patient has angry back (see photo). Based on that the results are difficult to obtain. However, all the reactions were well defined erosions without infiltration and therefore rather irritant reactions with underlying atopic dermatitis than real allergic reactions.  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 Pteronyssinus [facility]    --> Plan in future prick tests with HDM and moulds    ==> Treatment prescribed/Plan:  --> Because no real convincing reaction in patch tests (mostly irritant with underlying atopic dermatitis) I propose treatment with Dupixent (600mg s.c. Initially and then every other week 300mg)  --> very dry skin and therefore use 2-3 times per day on entire body Vanicream cream (without parabens) and use free&clear shampoo and soap (patient is easily irritated)  --> until Dupixent can be given I will prescribe to patient now Prednisone p.o. Starting at 50mg per day (morning) and reducing every day by 5mg   --> Hydroxyzine 25mg evening and Cetirizine morning and if necessary Noon          Follow-ups after your visit        Your next 10 appointments already scheduled     Nov 05, 2018 10:00 AM CST   (Arrive by 9:45 AM)   Return Allergy with Leon Lanier MD   [facility] Dermatology ([facility])    [address] [place] [id number] [phone]              Who to contact     Please call your clinic at [phone] to:    Ask questions about your health    Make or cancel appointments    Discuss your medicines    Learn about your test results    Speak to your doctor            Additional Information About Your Visit        Care EveryWhere ID     This is your Care EveryWhere ID. This could be used by other organizations to access your [place] medical records  [id number]         Blood Pressure from Last 3 Encounters:   No data found for BP    Weight from Last 3 Encounters:   No data found for Wt              Today, you had the following     No orders found for display         Today's Medication Changes          These changes are accurate as of 10/5/18  3:14 PM.  If you have any questions, ask your nurse or doctor.               Start taking these medicines.        Dose/Directions    * Dupilumab 300 MG/2ML syringe   Commonly known  as:  DUPIXENT   Used for:  Atopic neurodermatitis        Dose:  600 mg   Inject 4 mLs (600 mg) Subcutaneous once for 1 dose   Quantity:  4 mL   Refills:  0       * Dupilumab 300 MG/2ML syringe   Commonly known as:  DUPIXENT   Used for:  Atopic neurodermatitis        Dose:  300 mg   Inject 2 mLs (300 mg) Subcutaneous every 14 days   Quantity:  4 mL   Refills:  5       emollient cream   Used for:  Atopic neurodermatitis        Apply topically as needed for other Put on entire skin 2-3 times per day. Very dry skin and might use one jar every 1-2 weeks.   Quantity:  453 g   Refills:  8       predniSONE 20 MG tablet   Commonly known as:  DELTASONE   Used for:  Atopic neurodermatitis        Start at 50mg (today and tomorrow morning) and then reduce by 5mg every day ==> therefore after 11 days stop Prednisone Please provide patient with enough 20mg and 5mg Tabl   Quantity:  22 tablet   Refills:  0       * Notice:  This list has 2 medication(s) that are the same as other medications prescribed for you. Read the directions carefully, and ask your doctor or other care provider to review them with you.         Where to get your medicines      Some of these will need a paper prescription and others can be bought over the counter.  Ask your nurse if you have questions.     Bring a paper prescription for each of these medications     emollient cream    predniSONE 20 MG tablet         Call your pharmacy to confirm that your medication is ready for pickup. It may take up to 24 hours for them to receive the prescription. If the prescription is not ready within 3 business days, please contact your clinic or your provider.     We will let you know when these medications are ready. If you don't hear back within 3 business days, please contact us.     Dupilumab 300 MG/2ML syringe    Dupilumab 300 MG/2ML syringe                Primary Care Provider    None Specified       No primary provider on file.        Equal Access to Services      [facility]: Hadii aad ku raghav Lucia, waaxda luqadaha, qaybta kaalmada adesmith, nanette lola jessicadaiana linares malgorzatamejia taylor . So [facility] [phone].    ATENCIÓN: Si habla español, tiene a [place] disposición servicios gratuitos de asistencia lingüística. Llame al [phone].    We comply with applicable federal civil rights laws and [place] laws. We do not discriminate on the basis of race, color, national origin, age, disability, sex, sexual orientation, or gender identity.            Thank you!     Thank you for choosing [facility] DERMATOLOGY  for your care. Our goal is always to provide you with excellent care. Hearing back from our patients is one way we can continue to improve our services. Please take a few minutes to complete the written survey that you may receive in the mail after your visit with us. Thank you!             Your Updated Medication List - Protect others around you: Learn how to safely use, store and throw away your medicines at www.disposemymeds.org.          This list is accurate as of 10/5/18  3:14 PM.  Always use your most recent med list.                   Brand Name Dispense Instructions for use Diagnosis    atorvastatin 80 MG tablet    LIPITOR          cetirizine 10 MG tablet    zyrTEC          doxepin 50 MG capsule    SINEquan          * Dupilumab 300 MG/2ML syringe    DUPIXENT    4 mL    Inject 4 mLs (600 mg) Subcutaneous once for 1 dose    Atopic neurodermatitis       * Dupilumab 300 MG/2ML syringe    DUPIXENT    4 mL    Inject 2 mLs (300 mg) Subcutaneous every 14 days    Atopic neurodermatitis       emollient cream     453 g    Apply topically as needed for other Put on entire skin 2-3 times per day. Very dry skin and might use one jar every 1-2 weeks.    Atopic neurodermatitis       FLOMAX 0.4 MG capsule   Generic drug:  tamsulosin           folic acid 1 MG tablet    FOLVITE          gabapentin 300 MG capsule    NEURONTIN          hydrOXYzine 25 MG tablet    ATARAX           pantoprazole 40 MG EC tablet    PROTONIX          predniSONE 20 MG tablet    DELTASONE    22 tablet    Start at 50mg (today and tomorrow morning) and then reduce by 5mg every day ==> therefore after 11 days stop Prednisone Please provide patient with enough 20mg and 5mg Tabl    Atopic neurodermatitis       RANITIDINE 150 MAX STRENGTH 150 MG tablet   Generic drug:  ranitidine           triamcinolone 0.1 % cream    KENALOG          * Notice:  This list has 2 medication(s) that are the same as other medications prescribed for you. Read the directions carefully, and ask your doctor or other care provider to review them with you.

## 2018-10-08 NOTE — TELEPHONE ENCOUNTER
Asking [facility] for assistance with this patient/ next steps.    Will also prep Dupixent MyWay forms to see if support program can/ will assist him.    [facility]?

## 2018-10-08 NOTE — TELEPHONE ENCOUNTER
[email or web address]

## 2018-10-10 NOTE — TELEPHONE ENCOUNTER
[facility] cov[facility] [place] for Dupixent rec'd. Completed and faxed back with chart notes. Per rep, can take a couple of days to result and may or may not end in coverage.    Faxed in [employer] forms with pt's verbal auth. Confirmation fax rec'd.

## 2018-10-10 NOTE — TELEPHONE ENCOUNTER
[facility] Call Center    Phone Message    May a detailed message be left on voicemail: yes    Reason for Call: Form or Letter   Type or form/letter needing completion:medication criteria form  Provider: Dr Lanier  Date form needed: ASAP  Once completed: Fax form to: Number provided on form  Marilu from [phone] requests callback to confirm receipt of her fax about medication Dupixient    Action Taken: Message routed to:  [facility] eye

## 2018-10-11 ENCOUNTER — TELEPHONE (OUTPATIENT)
Dept: DERMATOLOGY | Facility: CLINIC | Age: 68
End: 2018-10-11

## 2018-10-11 NOTE — TELEPHONE ENCOUNTER
[facility] Call Center    Phone Message    May a detailed message be left on voicemail: yes    Reason for Call: Medication Question or concern regarding medication   Prescription Clarification  Name of Medication: Dupilumab (DUPIXENT) 300 MG/2ML syringe  Prescribing Provider: Dr. Leon Lanier   Pharmacy: [facility]   What on the order needs clarification? 1) Nicole needs actual EASI value to determine wheter it will be renewed after 16 weeks and 2) Send a legal Rx so that Nicole can dispense the Rx. Thank you!          Action Taken: Message routed to:  [facility] ([facility]  Dermatology Clinic

## 2018-10-11 NOTE — TELEPHONE ENCOUNTER
[facility]. She notated pt has moderate to severe disease. They are going to approve him for 16 week trial of Dupixent. Score will need to be re-evaluated at that time to continue with 1 yr approval. [facility] via fax# [phone].

## 2018-10-11 NOTE — TELEPHONE ENCOUNTER
[facility] (ph# [phone] ext [phone]). She notated pt has moderate to severe disease. They are going to approve him for 16 week trial of Dupixent. Score will need to be re-evaluated at that time to continue with 1 yr approval. [facility] via fax# [phone].

## 2018-10-17 NOTE — TELEPHONE ENCOUNTER
Pt simultaneously approved for free drug thru   But liaison informed program that he was approved thru the [facility].

## 2018-10-29 ENCOUNTER — OFFICE VISIT (OUTPATIENT)
Dept: DERMATOLOGY | Facility: CLINIC | Age: 68
End: 2018-10-29
Payer: COMMERCIAL

## 2018-10-29 DIAGNOSIS — L27.0 DRUG RASH: Primary | ICD-10-CM

## 2018-10-29 RX ORDER — PREDNISONE 5 MG/1
10 TABLET ORAL DAILY
Qty: 15 TABLET | Refills: 0 | Status: SHIPPED | OUTPATIENT
Start: 2018-10-29 | End: 2018-10-30

## 2018-10-29 ASSESSMENT — PAIN SCALES - GENERAL: PAINLEVEL: NO PAIN (0)

## 2018-10-29 NOTE — NURSING NOTE
Dermatology Rooming Note    Mike Naidu's goals for this visit include:   Chief Complaint   Patient presents with     Derm Problem     Mike is here today to be seen for a possible drug rash from Dupixent.      EDI Gill

## 2018-10-29 NOTE — PROGRESS NOTES
[facility] Dermatology Note      Dermatology Problem List:    Specialty Problems     None          CC:   Derm Problem (Mike is here today to be seen for a possible drug rash from Dupixent. )      Encounter Date: Oct 29, 2018    History of Present Illness:  Mr. Mike Naidu is a 68 year old male who presents for evaluation of possible drug rash.     Pt notes he took first dose of Dupixent 10/17/18 (no previous exposure to this medication). 4 days after injection, he noticed diffuse erythema on upper extremities, lower extremities abdomen and back. Rash was itchy and non painful. No oral involvement. Rash improved with triamcinolone cream and rash is no longer present however several scabs are visible on arms and legs from itching. No fevers or chills. Other than dupixient, no new prescribed or OTC med or supplements. No other new exposures. Pt notes he called company who makes dupixient to discuss his reaction as a possible side effect.      Past Medical History:   There is no problem list on file for this patient.    History reviewed. No pertinent past medical history.    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 Patient has the following hobbies or non-occupational exposure: fishing and grand kids      Family History:  History reviewed. No pertinent family history.  Brothers daughter has atopic eczema  Patients own kids (5) and grand kids ==> on daughter had as child eczema and her son as well    Medications:  Current Outpatient Prescriptions   Medication Sig Dispense Refill     atorvastatin (LIPITOR) 80 MG tablet        cetirizine (ZYRTEC) 10 MG  tablet        doxepin (SINEQUAN) 50 MG capsule        Dupilumab (DUPIXENT) 300 MG/2ML syringe Inject 2 mLs (300 mg) Subcutaneous every 14 days 4 mL 5     emollient (VANICREAM) cream Apply topically as needed for other Put on entire skin 2-3 times per day. Very dry skin and might use one jar every 1-2 weeks. 453 g 8     FLOMAX 0.4 MG capsule        folic acid (FOLVITE) 1 MG tablet        gabapentin (NEURONTIN) 300 MG capsule        hydrOXYzine (ATARAX) 25 MG tablet        pantoprazole (PROTONIX) 40 MG EC tablet        predniSONE (DELTASONE) 20 MG tablet Start at 50mg (today and tomorrow morning) and then reduce by 5mg every day ==> therefore after 11 days stop Prednisone  Please provide patient with enough 20mg and 5mg Tabl 22 tablet 0     RANITIDINE 150 MAX STRENGTH 150 MG tablet        triamcinolone (KENALOG) 0.1 % cream          Review of Systems:  -As per HPI  -Constitutional: The patient denies fatigue, fevers, chills, unintended weight loss, and night sweats.  -HEENT: Patient denies nonhealing oral sores.  -Skin: As above in HPI. No additional skin concerns.    Physical exam:  Vitals: There were no vitals taken for this visit.  GEN: This is a well developed, well-nourished male in no acute distress, in a pleasant mood.    SKIN: Total skin excluding the undergarment areas was performed. The exam included the head/face, neck, both arms, chest, back, abdomen, both legs, digits and/or nails.   Subacute to chronic eczema on extremities and as well erythema on trunk. Generally dry skin and on extremities with multiple scratch marks and prurigo-like lesions. Over ears subacute eczemas with swelling and as well front and scalp with itch and desquamation    -No other lesions of concern on areas examined.         Order for PATCH TESTS    [] Outpatient  [] Inpatient: [place]..../ Bed ....      Skin Atopy (atopic dermatitis) [x] Yes   [] No  Rhinitis/Sinusitis:   [x] Yes   [] No  Allergic Asthma:   [] Yes   [x] No  Food  Allergy:   [] Yes   [x] No  Leg ulcers:   [] Yes   [x] No  Hand eczema:   [x] Yes   [] No   Leading hand:   [x] R   [] L       [] Ambidextrous                        Reason for tests (suspected allergy): generalized subacute to chronic eczema with spongiosis and peripheral and skin Eosinophilia  Known previous allergies: Sulphonamides, Valproic acid and Procaine    Standardized panels  [x] Standard panel (40 tests)  [x] Preservatives & Antimicrobials (31 tests)  [x] Emulsifiers & Additives (25 tests)   [] Perfumes/Flavours & Plants (25 tests)  [] Hairdresser panel (12 tests)  [] Rubber Chemicals (22 tests)  [] Plastics (26 tests)  [] Colorants/Dyes/Food additives (20 tests)  [] Metals (implants/dental) (23 tests)  [] Local anaesthetics/NSAIDs (12 tests)  [] Antibiotics & Antimycotics (14 tests)   [x] Corticosteroids (15 tests)   [] Photopatch test (32 tests)   [] others: ...      [] Patient's own products: ...    DO NOT test if chemical or biological identity is unknown!     always ask from patient the product information and safety sheets (MSDS)     [] Patient needs consultation with Allergy team (changes of tests may apply)  [x] Tests discussed with Allergy team (can have direct appointment for patch tests)    RESULTS & EVALUATION of PATCH TESTS    Date/time of application:  Physician/Nurse:  / Felicia Ceballos LPN               Localization of application: Back --> on lower back chronic eczema, but upper back can be used for patch tests. Rather dry there, but no acute eczema    Patch test readings after     [x] 2 days, [] 3 days [x] 4 days, [] 5 days,   Other duration: ...    Applied patch tests with results (import here the list of patch tests):  Date/time of application:10/01/18   Physician/Nurse:  / Felicia Ceballos LPN               Localization of application: [place]     STANDARD Series         No Substance 2 days 4 days remarks   1 Jhony Mix [C] - -    2 Colophony - -    3   2-Mercaptobenzothiazole  - -     4 Methylisothiazolinone - -    5 Carba Mix - -    6 Thiurma Mix [A] - -    7 Bisphenol A Epoxy Resin - -    8 J-Ppzy-Qtbkyvnxgqp-Formaldehyde Resin - -    9 Mercapto Mix [A] - -    10 Black Rubber Mix- PPD [B] - -    11 Potassium Dichromate  + +/++ 5mm erosion   12 Balsam of [place] (Myroxylon Pereirae Resin) - -    13 Nickel Sulphate Hexahydrate +/++ ++ 14mm erosion   14 Mixed Dialkyl Thiourea - -    15 Paraben Mix [B] (+) + 4mm erosion   16 Methyldibromo Glutaronitrile - + 4mm erosion   17 Fragrance Mix - -    18 2-Bromo-2-Nitropropane-1,3-Diol (Bronopol) - -    19 Lyral - -    20 Tixocortol-21- Pivalate - -    21 Diazolidiyl Urea (Germall II) - -    22 Methyl Methacrylate - -    23 Cobalt (II) Chloride Hexahydrate + -    24 Fragrance Mix II  - -    25 Compositae Mix - -    26 Benzoyl Peroxide - -    27 Bacitracin - -    28 Formaldehyde - -    29 Methylchloroisothiazolinone / Methylisothiazolinone - -    30 Corticosteroid Mix - -    31 Sodium Lauryl Sulfate - -    32 Lanolin Alcohol - -    33 Turpentine - -    34 Cetylstearylalcohol - -    35 Chlorhexidine Dicluconate - -    36 Budenoside - -    37 Imidazolidinyl Urea  - -    38 Ethyl-2 Cyanoacrylate - -    39 Quaternium 15 (Dowicil 200) - -    40 Decyl Glucoside - -      PRESERVATIVES & ANTIMICROBIALS         No Substance 2 days 4 days remarks   41  1,2-Benzisothiazoline-3-One, Sodium Salt - -    42  1,3,5-Antwan (2-Hydroxyethyl) - Hexahydrotriazine (Grotan BK) - -    43 9-Ubdbwngzqvadj-2-Nitro-1, 3-Propanediol - -    44  3, 4, 4' - Triclocarban - -    45 4 - Chloro - 3 - Cresol - -    46 4 - Chloro - 4 - Xylenol (PCMX) - -    47 7-Ethylbicyclooxazolidine (Bioban FL8740) - -    48 Benzalkonium Chloride - -    49 Benzyl Alcohol - -    50 Cetalkonium Chloride - -    51 Cetylpyrimidine Chloride  - -    NA Chloroacetamide NA NA    52 DMDM Hydantoin - -    53 Glutaraldehyde - -    54 Triclosan - -    55 Glyoxal Trimeric Dihydrate (+) + 7mm  Erosion   56 Iodopropynyl Butylcarbamate - -    57 Octylisothiazoline - -    58 Iodoform - -    59 (Nitrobutyl) Morpholine/(Ethylnitro-Trimethylene) Dimorpholine (Bioban P 1487) - -    60 Phenoxyethanol - -    61 Phenyl Salicylate - -    62 Povidone Iodine - -    63 Sodium Benzoate - -    64 Sodium Disulfite - -    65 Sorbic Acid - -    66 Thimerosal - -     Parabens      67 Butyl-P-Hydroxybenzoate - -    68 Ethyl-P-Hydroxybenzoate - -    69 Methyl-P-Hydroxybenzoate - -    70 Propyl-P-Hydroxybenzoate - -      EMULSIFIERS & ADDITIVES        No Substance 2 days 4 days remarks   71 Polyethylene Glycol-400 (+) -    72 Cocamidopropyl Betaine - -    73 Amerchol L101 - -    74 Propylene Glycol - -    75 Triethanolamine - -    76 Sorbitane Sesquiolate - -    77 Isopropylmyristate (+) -    78 Polysorbate 80  - -    79 Amidoamine   (Stearamidopropyl Dimethylamine) - -    80 Oleamidopropyl Dimethylamine - -    81 Lauryl Glucoside - -    82 Coconut Diethanolamide  - -    83 2-Hydroxy-4-Methoxy Benzophenone (Oxybenzone) - -    84 Benzophenone-4 (Sulisobenzon) - -    85 Propolis - -    86 Dexpanthenol - -    87 Carboxymethyl Cellulose Sodium - -    88 Abitol - -    89 Tert-Butylhydroquinone - -    90 Benzyl Salicylate - -     Antioxidant      91 Dodecyl Gallate - -    92 Butylhydroxyanisole (BHA) - -    93 Butylhydroxytoluene (BHT) - -    94 Di-Alpha-Tocopherol (Vit E) - -    95 Propyl Gallate - -          CORTICOSTEROIDS    No Substance 2 days 4 days remarks Allergy  class   96 Amcinonide - -  B   97 Betametasone-17,21 Dipropionate - -  D1   98 Desoximetasone - -  C   99 Betamethasone-17-Valerate - -  D1   100 Dexamethasone - -  C   101 Hydrocortisone - -  A   102 Clobetasol-17-Propionate - -  D1   103 Dexamethasone-21-Phosphate Disodium Salt - -  C   104 Hydrocortisone-17 Butyrate - -  D2   105 Prednisolone - -  A   106 Mometason Furoate - -  D1   107 Triamcinolone Acetonide - -  B   108 Methylprednisolone Aceponate - -  D2    109 Hydrocortisone-21-Acetate - -  A   110 Prednicarbate - -  D2       Group Characteristics of group Generic name Name  cross reactions   A Hydrocortisone   Cloprednole, Fludrocortisone acétate, Hydrocortison acetate, Methylprednisolone, Prednisolone, Tixocortolpivalate Alfacortone, Fucidin H, Dermacalm, Hexacortone, Premandole, Imacort With group D2   B Triamcinolone-acetonide   Budenoside (R-isomer), Amcinonide, Desonide, Fluocinolone acetonide, Triamcinolone acetonide Locapred, Locatop  Synalar, Pevisone, [place] -   C Betamethasone (Without Romina)   Betamethasone, Dexamethasone, Flumethasone pivalate, Halomethasone Daivobet, Dexasalyl, Locasalen,   -   D1 Betamethasone-diproprionate   Betamethasone dipropionate, Betamethasone-17-valerate, Clobetasole-propionate, Fluticasone propionate, Mometasone furoate Betnovate, Diprogenta, Diprosalic, Diprosone, Celestoderm, Fucicort,  Cutivate, Axotide, Elocom -   D2 Methylprednisolone-aceponate   Hydrocortisone-aceponate, Hydrocortisone-buteprate, Hydrocortisone-17-butyrate, Methylprednisolone aceponate, Prednicarbate Locoïd, Advantan,  Prednitop With group A and Budesonide (S-isomer)             Results of patch tests:                         Interpretation:    - Negative                    A    = Allergic      (+) Erythema    TI   = Toxic/irritant   + E + Infiltration    RaP = Relevance at Present     ++ E/I + Papulovesicle   Rpr  = Relevance Previously     +++ E/I/P + Blister     nR   = No Relevance      [] No relevant allergic reaction observed    [x] Allergic reaction diagnosed against: against metals (Cobalt, Nickel and chromate) and as well angry back --> could go well with atopic dermatitis   ==> the reactions are all erosions and not typical allergic, infiltrated lesions.     Interpretation/ remarks:   First of all, after removal patient has angry back (see photo). Based on that the results are difficult to obtain. However, all the reactions were well defined  erosions without infiltration and therefore rather irritant reactions with underlying atopic dermatitis than real allergic reactions.  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 Pteronyssinus --> done [place]?    --> Plan in future prick tests with HDM and moulds  --> new reaction concerning for atopic dermatitis flare vs drug reaction to dupixient (less likely as this is first exposure)     - aggravation of the atopic dermatitis after 1st injection of Dupixent   DDx allergy to Dupixent (ADR) --> unlikely, because 1st dose and primo-sensibilisation takes usually 8-10 days    ==> Treatment prescribed/Plan:  --> Because no real convincing reaction in patch tests (mostly irritant with underlying atopic dermatitis) I propose treatment with Dupixent (600mg s.c. Initially and then every other week 300mg)  --> very dry skin and therefore use 2-3 times per day on entire body Vanicream cream (without parabens) and use free&clear shampoo and soap (patient is easily irritated)  --> until Dupixent can be given I will prescribe to patient now Prednisone p.o. Starting at 50mg per day (morning) and reducing every day by 5mg   --> Hydroxyzine 25mg evening and Cetirizine morning and if necessary Noon  --> continue dupixient (injection  planned for 10/31/18)  --> Prednisone in 5 day taper (50mg, 40mg, 30mg, 20mg, 10 mg) if repeat rash after injection vs repeat triamcinolone if less significant rash     Dominic Mera MD  PGY-3    Staff Physician Comments:  I saw and evaluated the patient with the resident and I agree with the assessment and plan as documented in the resident's note.    Leon Lanier MD  Professor  Head of Dermato-Allergy Division  Department of Dermatology  [facility]

## 2018-10-29 NOTE — LETTER
10/29/2018       RE: Mike Naidu  [address] [facility] [address]     Dear Colleague,    Thank you for referring your patient, Mike Naidu, to the [facility] DERMATOLOGY at [facility]. Please see a copy of my visit note below.    [facility] Dermatology Note      Dermatology Problem List:    Specialty Problems     None          CC:   Derm Problem (Mike is here today to be seen for a possible drug rash from Dupixent. )      Encounter Date: Oct 29, 2018    History of Present Illness:  Mr. Mike Naidu is a 68 year old male who presents for evaluation of possible drug rash.     Pt notes he took first dose of Dupixent 10/17/18 (no previous exposure to this medication). 4 days after injection, he noticed diffuse erythema on upper extremities, lower extremities abdomen and back. Rash was itchy and non painful. No oral involvement. Rash improved with triamcinolone cream and rash is no longer present however several scabs are visible on arms and legs from itching. No fevers or chills. Other than dupixient, no new prescribed or OTC med or supplements. No other new exposures. Pt notes he called company who makes dupixient to discuss his reaction as a possible side effect.      Past Medical History:   There is no problem list on file for this patient.    History reviewed. No pertinent past medical history.    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 Patient has the following hobbies or non-occupational exposure: fishing and grand kids      Family History:  History reviewed. No pertinent family  history.  Brothers daughter has atopic eczema  Patients own kids (5) and grand kids ==> on daughter had as child eczema and her son as well    Medications:  Current Outpatient Prescriptions   Medication Sig Dispense Refill     atorvastatin (LIPITOR) 80 MG tablet        cetirizine (ZYRTEC) 10 MG tablet        doxepin (SINEQUAN) 50 MG capsule        Dupilumab (DUPIXENT) 300 MG/2ML syringe Inject 2 mLs (300 mg) Subcutaneous every 14 days 4 mL 5     emollient (VANICREAM) cream Apply topically as needed for other Put on entire skin 2-3 times per day. Very dry skin and might use one jar every 1-2 weeks. 453 g 8     FLOMAX 0.4 MG capsule        folic acid (FOLVITE) 1 MG tablet        gabapentin (NEURONTIN) 300 MG capsule        hydrOXYzine (ATARAX) 25 MG tablet        pantoprazole (PROTONIX) 40 MG EC tablet        predniSONE (DELTASONE) 20 MG tablet Start at 50mg (today and tomorrow morning) and then reduce by 5mg every day ==> therefore after 11 days stop Prednisone  Please provide patient with enough 20mg and 5mg Tabl 22 tablet 0     RANITIDINE 150 MAX STRENGTH 150 MG tablet        triamcinolone (KENALOG) 0.1 % cream          Review of Systems:  -As per HPI  -Constitutional: The patient denies fatigue, fevers, chills, unintended weight loss, and night sweats.  -HEENT: Patient denies nonhealing oral sores.  -Skin: As above in HPI. No additional skin concerns.    Physical exam:  Vitals: There were no vitals taken for this visit.  GEN: This is a well developed, well-nourished male in no acute distress, in a pleasant mood.    SKIN: Total skin excluding the undergarment areas was performed. The exam included the head/face, neck, both arms, chest, back, abdomen, both legs, digits and/or nails.   Subacute to chronic eczema on extremities and as well erythema on trunk. Generally dry skin and on extremities with multiple scratch marks and prurigo-like lesions. Over ears subacute eczemas with swelling and as well front and scalp  with itch and desquamation    -No other lesions of concern on areas examined.         Order for PATCH TESTS    [] Outpatient  [] Inpatient: [facility]..../ Bed ....      Skin Atopy (atopic dermatitis) [x] Yes   [] No  Rhinitis/Sinusitis:   [x] Yes   [] No  Allergic Asthma:   [] Yes   [x] No  Food Allergy:   [] Yes   [x] No  Leg ulcers:   [] Yes   [x] No  Hand eczema:   [x] Yes   [] No   Leading hand:   [x] R   [] L       [] Ambidextrous                        Reason for tests (suspected allergy): generalized subacute to chronic eczema with spongiosis and peripheral and skin Eosinophilia  Known previous allergies: Sulphonamides, Valproic acid and Procaine    Standardized panels  [x] Standard panel (40 tests)  [x] Preservatives & Antimicrobials (31 tests)  [x] Emulsifiers & Additives (25 tests)   [] Perfumes/Flavours & Plants (25 tests)  [] Hairdresser panel (12 tests)  [] Rubber Chemicals (22 tests)  [] Plastics (26 tests)  [] Colorants/Dyes/Food additives (20 tests)  [] Metals (implants/dental) (23 tests)  [] Local anaesthetics/NSAIDs (12 tests)  [] Antibiotics & Antimycotics (14 tests)   [x] Corticosteroids (15 tests)   [] Photopatch test (32 tests)   [] others: ...      [] Patient's own products: ...    DO NOT test if chemical or biological identity is unknown!     always ask from patient the product information and safety sheets (MSDS)     [] Patient needs consultation with Allergy team (changes of tests may apply)  [x] Tests discussed with Allergy team (can have direct appointment for patch tests)    RESULTS & EVALUATION of PATCH TESTS    Date/time of application:  Physician/Nurse:  / Felicia Ceballos LPN               Localization of application: Back --> on lower back chronic eczema, but upper back can be used for patch tests. Rather dry there, but no acute eczema    Patch test readings after     [x] 2 days, [] 3 days [x] 4 days, [] 5 days,   Other duration: ...    Applied patch tests with results  (import here the list of patch tests):  Date/time of application:10/01/18   Physician/Nurse:  / Felicia Ceballos LPN               Localization of application: Back     STANDARD Series         No Substance 2 days 4 days remarks   1 Jhony Mix [C] - -    2 Colophony - -    3  2-Mercaptobenzothiazole  - -     4 Methylisothiazolinone - -    5 Carba Mix - -    6 Thiurma Mix [A] - -    7 Bisphenol A Epoxy Resin - -    8 N-Kibb-Qjfaballgaq-Formaldehyde Resin - -    9 Mercapto Mix [A] - -    10 Black Rubber Mix- PPD [B] - -    11 Potassium Dichromate  + +/++ 5mm erosion   12 Balsam of [place] (Myroxylon Pereirae Resin) - -    13 Nickel Sulphate Hexahydrate +/++ ++ 14mm erosion   14 Mixed Dialkyl Thiourea - -    15 Paraben Mix [B] (+) + 4mm erosion   16 Methyldibromo Glutaronitrile - + 4mm erosion   17 Fragrance Mix - -    18 2-Bromo-2-Nitropropane-1,3-Diol (Bronopol) - -    19 Lyral - -    20 Tixocortol-21- Pivalate - -    21 Diazolidiyl Urea (Germall II) - -    22 Methyl Methacrylate - -    23 Cobalt (II) Chloride Hexahydrate + -    24 Fragrance Mix II  - -    25 Compositae Mix - -    26 Benzoyl Peroxide - -    27 Bacitracin - -    28 Formaldehyde - -    29 Methylchloroisothiazolinone / Methylisothiazolinone - -    30 Corticosteroid Mix - -    31 Sodium Lauryl Sulfate - -    32 Lanolin Alcohol - -    33 Turpentine - -    34 Cetylstearylalcohol - -    35 Chlorhexidine Dicluconate - -    36 Budenoside - -    37 Imidazolidinyl Urea  - -    38 Ethyl-2 Cyanoacrylate - -    39 Quaternium 15 (Dowicil 200) - -    40 Decyl Glucoside - -      PRESERVATIVES & ANTIMICROBIALS         No Substance 2 days 4 days remarks   41  1,2-Benzisothiazoline-3-One, Sodium Salt - -    42  1,3,5-Antwan (2-Hydroxyethyl) - Hexahydrotriazine (Grotan BK) - -    43 4-Aalyiuffbpbal-0-Nitro-1, 3-Propanediol - -    44  3, 4, 4' - Triclocarban - -    45 4 - Chloro - 3 - Cresol - -    46 4 - Chloro - 4 - Xylenol (PCMX) - -    47  7-Ethylbicyclooxazolidine (Meusonican [id number]) - -    48 Benzalkonium Chloride - -    49 Benzyl Alcohol - -    50 Cetalkonium Chloride - -    51 Cetylpyrimidine Chloride  - -    NA Chloroacetamide NA NA    52 DMDM Hydantoin - -    53 Glutaraldehyde - -    54 Triclosan - -    55 Glyoxal Trimeric Dihydrate (+) + 7mm Erosion   56 Iodopropynyl Butylcarbamate - -    57 Octylisothiazoline - -    58 Iodoform - -    59 (Nitrobutyl) Morpholine/(Ethylnitro-Trimethylene) Dimorpholine (Meusonican P 1487) - -    60 Phenoxyethanol - -    61 Phenyl Salicylate - -    62 Povidone Iodine - -    63 Sodium Benzoate - -    64 Sodium Disulfite - -    65 Sorbic Acid - -    66 Thimerosal - -     Parabens      67 Butyl-P-Hydroxybenzoate - -    68 Ethyl-P-Hydroxybenzoate - -    69 Methyl-P-Hydroxybenzoate - -    70 Propyl-P-Hydroxybenzoate - -      EMULSIFIERS & ADDITIVES        No Substance 2 days 4 days remarks   71 Polyethylene Glycol-400 (+) -    72 Cocamidopropyl Betaine - -    73 Amerchol L101 - -    74 Propylene Glycol - -    75 Triethanolamine - -    76 Sorbitane Sesquiolate - -    77 Isopropylmyristate (+) -    78 Polysorbate 80  - -    79 Amidoamine   (Stearamidopropyl Dimethylamine) - -    80 Oleamidopropyl Dimethylamine - -    81 Lauryl Glucoside - -    82 Coconut Diethanolamide  - -    83 2-Hydroxy-4-Methoxy Benzophenone (Oxybenzone) - -    84 Benzophenone-4 (Sulisobenzon) - -    85 Propolis - -    86 Dexpanthenol - -    87 Carboxymethyl Cellulose Sodium - -    88 Abitol - -    89 Tert-Butylhydroquinone - -    90 Benzyl Salicylate - -     Antioxidant      91 Dodecyl Gallate - -    92 Butylhydroxyanisole (BHA) - -    93 Butylhydroxytoluene (BHT) - -    94 Di-Alpha-Tocopherol (Vit E) - -    95 Propyl Gallate - -          CORTICOSTEROIDS    No Substance 2 days 4 days remarks Allergy  class   96 Amcinonide - -  B   97 Betametasone-17,21 Dipropionate - -  D1   98 Desoximetasone - -  C   99 Betamethasone-17-Valerate - -  D1   100 Dexamethasone  - -  C   101 Hydrocortisone - -  A   102 Clobetasol-17-Propionate - -  D1   103 Dexamethasone-21-Phosphate Disodium Salt - -  C   104 Hydrocortisone-17 Butyrate - -  D2   105 Prednisolone - -  A   106 Mometason Furoate - -  D1   107 Triamcinolone Acetonide - -  B   108 Methylprednisolone Aceponate - -  D2   109 Hydrocortisone-21-Acetate - -  A   110 Prednicarbate - -  D2       Group Characteristics of group Generic name Name  cross reactions   A Hydrocortisone   Cloprednole, Fludrocortisone acétate, Hydrocortison acetate, Methylprednisolone, Prednisolone, Tixocortolpivalate Alfacortone, Fucidin H, Dermacalm, Hexacortone, Premandole, Imacort With group D2   B Triamcinolone-acetonide   Budenoside (R-isomer), Amcinonide, Desonide, Fluocinolone acetonide, Triamcinolone acetonide Locapred, Locatop  Synalar, Pevisone, Kenacort -   C Betamethasone (Without Romina)   Betamethasone, Dexamethasone, Flumethasone pivalate, Halomethasone Daivobet, Dexasalyl, Locasalen,   -   D1 Betamethasone-diproprionate   Betamethasone dipropionate, Betamethasone-17-valerate, Clobetasole-propionate, Fluticasone propionate, Mometasone furoate Betnovate, Diprogenta, Diprosalic, Diprosone, Celestoderm, Fucicort,  Cutivate, Axotide, Elocom -   D2 Methylprednisolone-aceponate   Hydrocortisone-aceponate, Hydrocortisone-buteprate, Hydrocortisone-17-butyrate, Methylprednisolone aceponate, Prednicarbate Locoïd, Advantan,  Prednitop With group A and Budesonide (S-isomer)             Results of patch tests:                         Interpretation:    - Negative                    A    = Allergic      (+) Erythema    TI   = Toxic/irritant   + E + Infiltration    RaP = Relevance at Present     ++ E/I + Papulovesicle   Rpr  = Relevance Previously     +++ E/I/P + Blister     nR   = No Relevance      [] No relevant allergic reaction observed    [x] Allergic reaction diagnosed against: against metals (Cobalt, Nickel and chromate) and as well angry back --> could  go well with atopic dermatitis   ==> the reactions are all erosions and not typical allergic, infiltrated lesions.     Interpretation/ remarks:   First of all, after removal patient has angry back (see photo). Based on that the results are difficult to obtain. However, all the reactions were well defined erosions without infiltration and therefore rather irritant reactions with underlying atopic dermatitis than real allergic reactions.  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 Pteronyssinus [facility]    --> Plan in future prick tests with HDM and moulds  --> new reaction concerning for atopic dermatitis flare vs drug reaction to dupixient (less likely as this is first exposure)     - aggravation of the atopic dermatitis after 1st injection of Dupixent   DDx allergy to Dupixent (ADR) --> unlikely, because 1st dose and primo-sensibilisation takes usually 8-10 days    ==> Treatment prescribed/Plan:  --> Because no real convincing reaction in patch tests (mostly irritant with underlying atopic dermatitis) I propose treatment with Dupixent (600mg s.c. Initially and then every other week 300mg)  --> very dry skin and therefore use 2-3 times per day on entire body Vanicream cream (without parabens) and use free&clear  shampoo and soap (patient is easily irritated)  --> until Dupixent can be given I will prescribe to patient now Prednisone p.o. Starting at 50mg per day (morning) and reducing every day by 5mg   --> Hydroxyzine 25mg evening and Cetirizine morning and if necessary Noon  --> continue dupixient (injection planned for 10/31/18)  --> Prednisone in 5 day taper (50mg, 40mg, 30mg, 20mg, 10 mg) if repeat rash after injection vs repeat triamcinolone if less significant rash     Dominic Mera MD  PGY-3    Staff Physician Comments:  I saw and evaluated the patient with the resident and I agree with the assessment and plan as documented in the resident's note.      Leon Lanier MD

## 2018-10-29 NOTE — MR AVS SNAPSHOT
After Visit Summary   10/29/2018    Mike Naidu    MRN: [id number]           Patient Information     Date Of Birth          1950        Visit Information        Provider Department      10/29/2018 2:30 PM Leon Lanier MD [facility] Dermatology        Today's Diagnoses     Drug rash    -  1       Follow-ups after your visit        Your next 10 appointments already scheduled     Nov 05, 2018 10:00 AM CST   (Arrive by 9:45 AM)   Return Allergy with Leon Lanier MD   [facility] Dermatology ([facility])    [address] [place] [address]  [address] [facility] [address] [phone]              Who to contact     Please call your clinic at [phone] to:    Ask questions about your health    Make or cancel appointments    Discuss your medicines    Learn about your test results    Speak to your doctor            Additional Information About Your Visit        Care EveryWhere ID     This is your Care EveryWhere ID. This could be used by other organizations to access your [facility] medical records  [id number]         Blood Pressure from Last 3 Encounters:   No data found for BP    Weight from Last 3 Encounters:   No data found for Wt              Today, you had the following     No orders found for display         Today's Medication Changes          These changes are accurate as of 10/29/18  7:50 PM.  If you have any questions, ask your nurse or doctor.               These medicines have changed or have updated prescriptions.        Dose/Directions    * predniSONE 20 MG tablet   Commonly known as:  DELTASONE   This may have changed:  Another medication with the same name was added. Make sure you understand how and when to take each.   Used for:  Atopic neurodermatitis   Changed by:  Leon Lanier MD        Start at 50mg (today and tomorrow morning) and then reduce by 5mg every day ==> therefore after 11 days stop Prednisone Please provide patient with enough  20mg and 5mg Tabl   Quantity:  22 tablet   Refills:  0       * predniSONE 5 MG tablet   Commonly known as:  DELTASONE   This may have changed:  You were already taking a medication with the same name, and this prescription was added. Make sure you understand how and when to take each.   Used for:  Drug rash   Changed by:  Leon Lanier MD        Dose:  10 mg   Take 2 tablets (10 mg) by mouth daily for 5 days   Quantity:  15 tablet   Refills:  0       * Notice:  This list has 2 medication(s) that are the same as other medications prescribed for you. Read the directions carefully, and ask your doctor or other care provider to review them with you.         Where to get your medicines      Some of these will need a paper prescription and others can be bought over the counter.  Ask your nurse if you have questions.     Bring a paper prescription for each of these medications     predniSONE 5 MG tablet                Primary Care Provider    None Specified       No primary provider on file.        Equal Access to Services     [facility]: Jp Lucai, fletcher chinchilla, clement sharma, nanette taylor . So [facility] [phone].    ATENCIÓN: Si habla español, tiene a [place] disposición servicios gratuitos de asistencia lingüística. Llame al [phone].    We comply with applicable federal civil rights laws and [place] laws. We do not discriminate on the basis of race, color, national origin, age, disability, sex, sexual orientation, or gender identity.            Thank you!     Thank you for choosing [facility] DERMATOLOGY  for your care. Our goal is always to provide you with excellent care. Hearing back from our patients is one way we can continue to improve our services. Please take a few minutes to complete the written survey that you may receive in the mail after your visit with us. Thank you!             Your Updated Medication List - Protect others around you:  Learn how to safely use, store and throw away your medicines at www.disposemymeds.org.          This list is accurate as of 10/29/18  7:50 PM.  Always use your most recent med list.                   Brand Name Dispense Instructions for use Diagnosis    atorvastatin 80 MG tablet    LIPITOR          cetirizine 10 MG tablet    zyrTEC          doxepin 50 MG capsule    SINEquan          Dupilumab 300 MG/2ML syringe    DUPIXENT    4 mL    Inject 2 mLs (300 mg) Subcutaneous every 14 days    Atopic neurodermatitis       emollient cream     453 g    Apply topically as needed for other Put on entire skin 2-3 times per day. Very dry skin and might use one jar every 1-2 weeks.    Atopic neurodermatitis       FLOMAX 0.4 MG capsule   Generic drug:  tamsulosin           folic acid 1 MG tablet    FOLVITE          gabapentin 300 MG capsule    NEURONTIN          hydrOXYzine 25 MG tablet    ATARAX          pantoprazole 40 MG EC tablet    PROTONIX          * predniSONE 20 MG tablet    DELTASONE    22 tablet    Start at 50mg (today and tomorrow morning) and then reduce by 5mg every day ==> therefore after 11 days stop Prednisone Please provide patient with enough 20mg and 5mg Tabl    Atopic neurodermatitis       * predniSONE 5 MG tablet    DELTASONE    15 tablet    Take 2 tablets (10 mg) by mouth daily for 5 days    Drug rash       RANITIDINE 150 MAX STRENGTH 150 MG tablet   Generic drug:  ranitidine           triamcinolone 0.1 % cream    KENALOG          * Notice:  This list has 2 medication(s) that are the same as other medications prescribed for you. Read the directions carefully, and ask your doctor or other care provider to review them with you.

## 2018-10-30 ENCOUNTER — TELEPHONE (OUTPATIENT)
Dept: DERMATOLOGY | Facility: CLINIC | Age: 68
End: 2018-10-30

## 2018-10-30 DIAGNOSIS — L27.0 DRUG RASH: ICD-10-CM

## 2018-10-30 RX ORDER — PREDNISONE 5 MG/1
10 TABLET ORAL DAILY
Qty: 15 TABLET | Refills: 0 | Status: SHIPPED | OUTPATIENT
Start: 2018-10-30 | End: 2022-07-29

## 2018-10-30 NOTE — TELEPHONE ENCOUNTER
[facility] Call Center    Phone Message    May a detailed message be left on voicemail: yes    Reason for Call: Other: Pt called stating he went to  his prednisone Dr. Lanier prescribed for him yesterday and the pharmacy does not have the order. I confirmed the pharmacy and saw in the chart that the order was sent. Pt has called the pharmacy both today and yesterday and they do not have it. Please send the order again. Pt asks that someone call him once the order has been sent.      Action Taken: Message routed to:  [facility]ER[facility]

## 2018-10-30 NOTE — TELEPHONE ENCOUNTER
Spoke with patient and pharmacy. Pharmacy would not take V.O. For new script. Script was re-ordered, printed and faxed to pharmacy at [phone]. Patient understands and will  medication tomorrow.

## 2018-11-06 ENCOUNTER — TELEPHONE (OUTPATIENT)
Dept: DERMATOLOGY | Facility: CLINIC | Age: 68
End: 2018-11-06

## 2018-11-06 NOTE — TELEPHONE ENCOUNTER
[facility] Call Center    Phone Message    May a detailed message be left on voicemail: yes    Reason for Call: Other: [facility] called stating [facility] from the visits that took place on 10/1 and 10/5 But the [facility] still needs [facility]. Notes and recs from the visit that took place with Dr. Lanier on 10/3/18. Please send this information over ASA.      Action Taken: Message routed to:  [facility] DERM

## 2018-11-06 NOTE — TELEPHONE ENCOUNTER
Talked to patient let him know I faxed the visit [facility] for [facility]3/[facility] [place].    EDI Mishra

## 2018-11-26 ENCOUNTER — TELEPHONE (OUTPATIENT)
Dept: DERMATOLOGY | Facility: CLINIC | Age: 68
End: 2018-11-26

## 2018-11-26 NOTE — TELEPHONE ENCOUNTER
[facility] Call Center    Phone Message    May a detailed message be left on voicemail: yes    Reason for Call: Form or Letter   Type or form/letter needing completion: Other: Franko, RN with [facility] (pt's primary Dr. Rollins) states they are needing documentation that Dr. Lanier is both an allergist and dermatologist. He states their billing dept did not have documentation of this and are denying coverage for his appts with Yannick.   Provider: Dr. Leon Lanier  Date form needed: ASAP  Once completed: Fax form to: [phone], attn Jocelin Rosario.    Action Taken: Message routed to:  [facility] Derm   97.6

## 2018-11-26 NOTE — TELEPHONE ENCOUNTER
"Tried calling [facility]o from [facility] to see what kind of documentation they need. Unable to leave voicemail or talk with Franko due to the phone being \"busy\". Will try again later.  EDI Gill     "

## 2018-12-03 ENCOUNTER — OFFICE VISIT (OUTPATIENT)
Dept: DERMATOLOGY | Facility: CLINIC | Age: 68
End: 2018-12-03
Payer: COMMERCIAL

## 2018-12-03 DIAGNOSIS — L20.9 ATOPIC DERMATITIS, UNSPECIFIED TYPE: Primary | ICD-10-CM

## 2018-12-03 RX ORDER — EMOLLIENT BASE
CREAM (GRAM) TOPICAL 3 TIMES DAILY
Qty: 453 G | Refills: 3 | Status: SHIPPED | OUTPATIENT
Start: 2018-12-03 | End: 2020-07-20

## 2018-12-03 ASSESSMENT — PAIN SCALES - GENERAL: PAINLEVEL: NO PAIN (0)

## 2018-12-03 NOTE — LETTER
12/3/2018       RE: Mike Naidu  [address] [facility] [address]     Dear Colleague,    Thank you for referring your patient, Mike Naidu, to the [facility] DERMATOLOGY at [facility]. Please see a copy of my visit note below.    [facility] Dermatology Note      Dermatology Problem List:  1. Atopic dermatitis  -Currently improving. S/p dupixent initiation 10/17/18. Initial concern for drug rash after starting Dupixent, but more consistent with atopic dermatitis flare.    Continue:  - Vanicream over entire body 2-3 times/day  - Dupixent 300mg every other week  - Triamcinolone 0.1% cream BID to eczematous patches 2 times a day for 2 weeks.   - Free and clear shampoos and soaps    Encounter Date: Dec 3, 2018    CC:  Chief Complaint   Patient presents with     Allergies     Mike is here for an allergy follow up         History of Present Illness:  Mr. Mike Naidu is a 68 year old male who presents for follow up of atopic dermatitis.    The patient was last seen 10/29/18. He initially presented with skin itching that began abruptly 3 years ago. Allergic patch testing showed mostly irritant dermatitis with underlying atopic dermatitis with no convincing allergies. Dupixent therapy was initiated at that time.    Pt notes he took first dose of Dupixent 10/17/18 (no previous exposure to this medication). Four days  developed an itchy rash and swelling that was concerning for dupixent drug allergy. However, presentation was more consistent with atopic dermatitis flare. He responded to a prednisone taper and triamcinolone cream.     He has now been on dupixent for about 5 weeks. He says he is starting to see some improvement in his skin, especially on his arms. He is less itchy and feels like his skin is less dry. Some of the redness is also starting to improve.    Past Medical History:   - No history of eczema in childhood. No  history of asthma.     History reviewed. No pertinent surgical history.    Social History:  Patient reports that he has never smoked. He has never used smokeless tobacco. 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     Family History:  Atopic dermatitis in 2 of his grandchildren.     Medications:  Current Outpatient Prescriptions   Medication Sig Dispense Refill     atorvastatin (LIPITOR) 80 MG tablet        cetirizine (ZYRTEC) 10 MG tablet        doxepin (SINEQUAN) 50 MG capsule        Dupilumab (DUPIXENT) 300 MG/2ML syringe Inject 2 mLs (300 mg) Subcutaneous every 14 days 4 mL 5     emollient (VANICREAM) cream Apply topically as needed for other Put on entire skin 2-3 times per day. Very dry skin and might use one jar every 1-2 weeks. 453 g 8     FLOMAX 0.4 MG capsule        folic acid (FOLVITE) 1 MG tablet        gabapentin (NEURONTIN) 300 MG capsule        hydrOXYzine (ATARAX) 25 MG tablet        pantoprazole (PROTONIX) 40 MG EC tablet        predniSONE (DELTASONE) 20 MG tablet Start at 50mg (today and tomorrow morning) and then reduce by 5mg every day ==> therefore after 11 days stop Prednisone  Please provide patient with enough 20mg and 5mg Tabl 22 tablet 0     predniSONE (DELTASONE) 5 MG tablet Take 2 tablets (10 mg) by mouth daily 15 tablet 0     RANITIDINE 150 MAX STRENGTH 150 MG tablet        triamcinolone (KENALOG) 0.1 % cream        Allergies   Allergen Reactions     Procaine Swelling     Sulfa Drugs Swelling and Itching     swelling     Valproic Acid Itching and Rash     hives         Review of Systems:  -Constitutional: Otherwise feeling well today, in usual state of health.  -Skin: As above in HPI. No additional skin concerns.    Physical exam:  Vitals: There were no vitals taken for this visit.  GEN: This is a well developed, well-nourished male in no acute distress, in a pleasant mood.    SKIN: Focused examination of the hands, arms, abdomen, chest,  back, and face was performed.  -Skin appears significantly improved since last visit  -No erythema or swelling in face. Facial skin well moisturized  -Hands with some open sores and areas of excoriation, but overall significantly improved  -Arms with some pale stria and redness. Some patches of dry skin. Some excoriation marks. Also significantly improved.  -Abdomen with no evidence of skin dryness or redness. Large purple skin discoloration at previous dupixent injection site.     Previous Patch Testing for Reference:    Order for PATCH TESTS    [] Outpatient  [] Inpatient: [facility]..../ Bed ....      Skin Atopy (atopic dermatitis) [x] Yes   [] No  Rhinitis/Sinusitis:   [x] Yes   [] No  Allergic Asthma:   [] Yes   [x] No  Food Allergy:   [] Yes   [x] No  Leg ulcers:   [] Yes   [x] No  Hand eczema:   [x] Yes   [] No   Leading hand:   [x] R   [] L       [] Ambidextrous                        Reason for tests (suspected allergy): generalized subacute to chronic eczema with spongiosis and peripheral and skin Eosinophilia  Known previous allergies: Sulphonamides, Valproic acid and Procaine    Standardized panels  [x] Standard panel (40 tests)  [x] Preservatives & Antimicrobials (31 tests)  [x] Emulsifiers & Additives (25 tests)   [] Perfumes/Flavours & Plants (25 tests)  [] Hairdresser panel (12 tests)  [] Rubber Chemicals (22 tests)  [] Plastics (26 tests)  [] Colorants/Dyes/Food additives (20 tests)  [] Metals (implants/dental) (23 tests)  [] Local anaesthetics/NSAIDs (12 tests)  [] Antibiotics & Antimycotics (14 tests)   [x] Corticosteroids (15 tests)   [] Photopatch test (32 tests)   [] others: ...      [] Patient's own products: ...    DO NOT test if chemical or biological identity is unknown!     always ask from patient the product information and safety sheets (MSDS)     [] Patient needs consultation with Allergy team (changes of tests may apply)  [x] Tests discussed with Allergy team (can have direct appointment for  patch tests)    RESULTS & EVALUATION of PATCH TESTS    Date/time of application:  Physician/Nurse:  / Felicia Ceballos LPN               Localization of application: Back --> on lower back chronic eczema, but upper back can be used for patch tests. Rather dry there, but no acute eczema    Patch test readings after     [x] 2 days, [] 3 days [x] 4 days, [] 5 days,   Other duration: ...    Applied patch tests with results (import here the list of patch tests):  Date/time of application:10/01/18   Physician/Nurse:  / Felicia Ceballos LPN               Localization of application: Back     STANDARD Series         No Substance 2 days 4 days remarks   1 Jhony Mix [C] - -    2 Colophony - -    3  2-Mercaptobenzothiazole  - -     4 Methylisothiazolinone - -    5 Carba Mix - -    6 Thiurma Mix [A] - -    7 Bisphenol A Epoxy Resin - -    8 O-Kiqx-Jotasmjnxxx-Formaldehyde Resin - -    9 Mercapto Mix [A] - -    10 Black Rubber Mix- PPD [B] - -    11 Potassium Dichromate  + +/++ 5mm erosion   12 Balsam of [place] (Myroxylon Pereirae Resin) - -    13 Nickel Sulphate Hexahydrate +/++ ++ 14mm erosion   14 Mixed Dialkyl Thiourea - -    15 Paraben Mix [B] (+) + 4mm erosion   16 Methyldibromo Glutaronitrile - + 4mm erosion   17 Fragrance Mix - -    18 2-Bromo-2-Nitropropane-1,3-Diol (Bronopol) - -    19 Lyral - -    20 Tixocortol-21- Pivalate - -    21 Diazolidiyl Urea (Germall II) - -    22 Methyl Methacrylate - -    23 Cobalt (II) Chloride Hexahydrate + -    24 Fragrance Mix II  - -    25 Compositae Mix - -    26 Benzoyl Peroxide - -    27 Bacitracin - -    28 Formaldehyde - -    29 Methylchloroisothiazolinone / Methylisothiazolinone - -    30 Corticosteroid Mix - -    31 Sodium Lauryl Sulfate - -    32 Lanolin Alcohol - -    33 Turpentine - -    34 Cetylstearylalcohol - -    35 Chlorhexidine Dicluconate - -    36 Budenoside - -    37 Imidazolidinyl Urea  - -    38 Ethyl-2 Cyanoacrylate - -    39 Quaternium 15  (Dowicil 200) - -    40 Decyl Glucoside - -      PRESERVATIVES & ANTIMICROBIALS         No Substance 2 days 4 days remarks   41  1,2-Benzisothiazoline-3-One, Sodium Salt - -    42  1,3,5-Antwan (2-Hydroxyethyl) - Hexahydrotriazine (Grotan BK) - -    43 6-Utzrjnrbdqbbk-6-Nitro-1, 3-Propanediol - -    44  3, 4, 4' - Triclocarban - -    45 4 - Chloro - 3 - Cresol - -    46 4 - Chloro - 4 - Xylenol (PCMX) - -    47 7-Ethylbicyclooxazolidine (Bioban AO9385) - -    48 Benzalkonium Chloride - -    49 Benzyl Alcohol - -    50 Cetalkonium Chloride - -    51 Cetylpyrimidine Chloride  - -    NA Chloroacetamide NA NA    52 DMDM Hydantoin - -    53 Glutaraldehyde - -    54 Triclosan - -    55 Glyoxal Trimeric Dihydrate (+) + 7mm Erosion   56 Iodopropynyl Butylcarbamate - -    57 Octylisothiazoline - -    58 Iodoform - -    59 (Nitrobutyl) Morpholine/(Ethylnitro-Trimethylene) Dimorpholine (Bioban P 1487) - -    60 Phenoxyethanol - -    61 Phenyl Salicylate - -    62 Povidone Iodine - -    63 Sodium Benzoate - -    64 Sodium Disulfite - -    65 Sorbic Acid - -    66 Thimerosal - -     Parabens      67 Butyl-P-Hydroxybenzoate - -    68 Ethyl-P-Hydroxybenzoate - -    69 Methyl-P-Hydroxybenzoate - -    70 Propyl-P-Hydroxybenzoate - -      EMULSIFIERS & ADDITIVES        No Substance 2 days 4 days remarks   71 Polyethylene Glycol-400 (+) -    72 Cocamidopropyl Betaine - -    73 Amerchol L101 - -    74 Propylene Glycol - -    75 Triethanolamine - -    76 Sorbitane Sesquiolate - -    77 Isopropylmyristate (+) -    78 Polysorbate 80  - -    79 Amidoamine   (Stearamidopropyl Dimethylamine) - -    80 Oleamidopropyl Dimethylamine - -    81 Lauryl Glucoside - -    82 Coconut Diethanolamide  - -    83 2-Hydroxy-4-Methoxy Benzophenone (Oxybenzone) - -    84 Benzophenone-4 (Sulisobenzon) - -    85 Propolis - -    86 Dexpanthenol - -    87 Carboxymethyl Cellulose Sodium - -    88 Abitol - -    89 Tert-Butylhydroquinone - -    90 Benzyl Salicylate -  -     Antioxidant      91 Dodecyl Gallate - -    92 Butylhydroxyanisole (BHA) - -    93 Butylhydroxytoluene (BHT) - -    94 Di-Alpha-Tocopherol (Vit E) - -    95 Propyl Gallate - -          CORTICOSTEROIDS    No Substance 2 days 4 days remarks Allergy  class   96 Amcinonide - -  B   97 Betametasone-17,21 Dipropionate - -  D1   98 Desoximetasone - -  C   99 Betamethasone-17-Valerate - -  D1   100 Dexamethasone - -  C   101 Hydrocortisone - -  A   102 Clobetasol-17-Propionate - -  D1   103 Dexamethasone-21-Phosphate Disodium Salt - -  C   104 Hydrocortisone-17 Butyrate - -  D2   105 Prednisolone - -  A   106 Mometason Furoate - -  D1   107 Triamcinolone Acetonide - -  B   108 Methylprednisolone Aceponate - -  D2   109 Hydrocortisone-21-Acetate - -  A   110 Prednicarbate - -  D2       Group Characteristics of group Generic name Name  cross reactions   A Hydrocortisone   Cloprednole, Fludrocortisone acétate, Hydrocortison acetate, Methylprednisolone, Prednisolone, Tixocortolpivalate Alfacortone, Fucidin H, Dermacalm, Hexacortone, Premandole, Imacort With group D2   B Triamcinolone-acetonide   Budenoside (R-isomer), Amcinonide, Desonide, Fluocinolone acetonide, Triamcinolone acetonide Locapred, Locatop  Synalar, Pevisone, Kenacort -   C Betamethasone (Without Romina)   Betamethasone, Dexamethasone, Flumethasone pivalate, Halomethasone Daivobet, Dexasalyl, Locasalen,   -   D1 Betamethasone-diproprionate   Betamethasone dipropionate, Betamethasone-17-valerate, Clobetasole-propionate, Fluticasone propionate, Mometasone furoate Betnovate, Diprogenta, Diprosalic, Diprosone, Celestoderm, Fucicort,  Cutivate, Axotide, Elocom -   D2 Methylprednisolone-aceponate   Hydrocortisone-aceponate, Hydrocortisone-buteprate, Hydrocortisone-17-butyrate, Methylprednisolone aceponate, Prednicarbate Locoïd, Advantan,  Prednitop With group A and Budesonide (S-isomer)             Results of patch tests:                          Interpretation:    - Negative                    A    = Allergic      (+) Erythema    TI   = Toxic/irritant   + E + Infiltration    RaP = Relevance at Present     ++ E/I + Papulovesicle   Rpr  = Relevance Previously     +++ E/I/P + Blister     nR   = No Relevance      [] No relevant allergic reaction observed    [x] Allergic reaction diagnosed against: against metals (Cobalt, Nickel and chromate) and as well angry back --> could go well with atopic dermatitis   ==> the reactions are all erosions and not typical allergic, infiltrated lesions.     Interpretation/ remarks:   First of all, after removal patient has angry back (see photo). Based on that the results are difficult to obtain. However, all the reactions were well defined erosions without infiltration and therefore rather irritant reactions with underlying atopic dermatitis than real allergic reactions.  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 Pteronyssinus --> [place]?    --> Plan in future prick tests with HDM and moulds       Impression/Plan:  1. Atopic dermatitis    Has been on Dupixent since 10/17/18. He thinks he is starting to see some improvement. Initially 600mg subcutaneously, then 300mg every other week. Initially some concern for drug reaction, but  allergic patch testing showed back erosion consistent with atopic dermatitis with no specific allergy    Continue triamcinolone to hands and arms as needed. Use two times a day for 2 weeks.     Continue Vanicream to entire body 2-3 times/day    Use gentle free and clear soaps and shampoos    Discontinue hydroxyzine. After one week, discontinue ranitidine. One week later, discontinue doxepin. One week later, discontinue cetirizine.    Follow-up in 2 months, earlier for new or changing lesions.     Staff Involved:  I, Radha Floyd ([id number]), saw and examined the patient in the presence of Dr. Lanier.    Staff Physician Comments:  I was present with the medical student who participated in the service and in the documentation of the note. I have verified the history and personally performed the physical exam and medical decision making. I agree with the assessment and plan as documented in the note. I have reviewed and if necessary amended the note.      I spent a total of 15 min face to face with Mike Naidu during today's office visit. About 50% of the time was spent counseling the patient and/or coordinating care regarding their allergy.      Leon Lanier MD

## 2018-12-03 NOTE — PATIENT INSTRUCTIONS
- Continue dupixent injections 300mg every other week. Will likely need to continue for at least 6-10 months.   - Continue vanicream all over body 2-3 times/day. This is important to maintain skin barrier and keep skin moisturized.  - Use free   - Use triamcinolone as needed to the most itchy areas of skin. Use 2 times a day for 2 weeks starting today.   - Stop taking hydroxyzine. A week later, stop the ranitidine. Then a week later, stop the doxepin. Then a week later, stop the cetirizine.     Follow up in 2 months.

## 2018-12-03 NOTE — NURSING NOTE
Dermatology Rooming Note    Mike Naidu's goals for this visit include:   Chief Complaint   Patient presents with     Allergies     Mike is here for an allergy follow up     Felicia Ceballos LPN

## 2018-12-03 NOTE — MR AVS SNAPSHOT
After Visit Summary   12/3/2018    Mike Naidu    MRN: [id number]           Patient Information     Date Of Birth          1950        Visit Information        Provider Department      12/3/2018 1:30 PM Leon Lanier MD [facility] Derm[facility]        Today's Diagnoses     Atopic dermatitis, unspecified type    -  1      Care Instructions    - Continue dupixent injections 300mg every other week. Will likely need to continue for at least 6-10 months.   - Continue vanicream all over body 2-3 times/day. This is important to maintain skin barrier and keep skin moisturized.  - Use free   - Use triamcinolone as needed to the most itchy areas of skin. Use 2 times a day for 2 weeks starting today.   - Stop taking hydroxyzine. A week later, stop the ranitidine. Then a week later, stop the doxepin. Then a week later, stop the cetirizine.     Follow up in 2 months.          Follow-ups after your visit        Follow-up notes from your care team     Return in about 2 months (around 2/3/2019).      Who to contact     Please call your clinic at [phone] to:    Ask questions about your health    Make or cancel appointments    Discuss your medicines    Learn about your test results    Speak to your doctor            Additional Information About Your Visit        Care EveryWhere ID     This is your Care EveryWhere ID. This could be used by other organizations to access your [facility] medical records  [id number]         Blood Pressure from Last 3 Encounters:   No data found for BP    Weight from Last 3 Encounters:   No data found for Wt              Today, you had the following     No orders found for display         Today's Medication Changes          These changes are accurate as of 12/3/18  3:03 PM.  If you have any questions, ask your nurse or doctor.               These medicines have changed or have updated prescriptions.        Dose/Directions    * emollient external cream   This may have  changed:  Another medication with the same name was added. Make sure you understand how and when to take each.   Used for:  Atopic neurodermatitis   Changed by:  Leon Lanier MD        Apply topically as needed for other Put on entire skin 2-3 times per day. Very dry skin and might use one jar every 1-2 weeks.   Quantity:  453 g   Refills:  8       * emollient external cream   This may have changed:  You were already taking a medication with the same name, and this prescription was added. Make sure you understand how and when to take each.   Used for:  Atopic dermatitis, unspecified type   Changed by:  Leon Lanier MD        Apply topically 3 times daily Apply to entire body three times a day to help maintain skin barrier.   Quantity:  453 g   Refills:  3       * Notice:  This list has 2 medication(s) that are the same as other medications prescribed for you. Read the directions carefully, and ask your doctor or other care provider to review them with you.         Where to get your medicines      Some of these will need a paper prescription and others can be bought over the counter.  Ask your nurse if you have questions.     Bring a paper prescription for each of these medications     emollient external cream                Primary Care Provider Office Phone # Fax #    Aidan Rollins [phone]       [facility] [address] [address]        Equal Access to Services     ARSENIO MA AH: Jp bullardo Soomaali, waaxda luqadaha, qaybta kaalmada adeegyada, nanette cruz. So [facility] [phone].    ATENCIÓN: Si habla español, tiene a [place] disposición servicios gratuitos de asistencia lingüística. Llame al [phone].    We comply with applicable federal civil rights laws and [place] laws. We do not discriminate on the basis of race, color, national origin, age, disability, sex, sexual orientation, or gender identity.            Thank  you!     Thank you for choosing [facility] DERMATOLOGY  for your care. Our goal is always to provide you with excellent care. Hearing back from our patients is one way we can continue to improve our services. Please take a few minutes to complete the written survey that you may receive in the mail after your visit with us. Thank you!             Your Updated Medication List - Protect others around you: Learn how to safely use, store and throw away your medicines at www.disposemymeds.org.          This list is accurate as of 12/3/18  3:03 PM.  Always use your most recent med list.                   Brand Name Dispense Instructions for use Diagnosis    atorvastatin 80 MG tablet    LIPITOR          cetirizine 10 MG tablet    zyrTEC          doxepin 50 MG capsule    SINEquan          Dupilumab 300 MG/2ML syringe    DUPIXENT    4 mL    Inject 2 mLs (300 mg) Subcutaneous every 14 days    Atopic neurodermatitis       * emollient external cream     453 g    Apply topically as needed for other Put on entire skin 2-3 times per day. Very dry skin and might use one jar every 1-2 weeks.    Atopic neurodermatitis       * emollient external cream     453 g    Apply topically 3 times daily Apply to entire body three times a day to help maintain skin barrier.    Atopic dermatitis, unspecified type       FLOMAX 0.4 MG capsule   Generic drug:  tamsulosin           folic acid 1 MG tablet    FOLVITE          gabapentin 300 MG capsule    NEURONTIN          hydrOXYzine 25 MG tablet    ATARAX          pantoprazole 40 MG EC tablet    PROTONIX          * predniSONE 20 MG tablet    DELTASONE    22 tablet    Start at 50mg (today and tomorrow morning) and then reduce by 5mg every day ==> therefore after 11 days stop Prednisone Please provide patient with enough 20mg and 5mg Tabl    Atopic neurodermatitis       * predniSONE 5 MG tablet    DELTASONE    15 tablet    Take 2 tablets (10 mg) by mouth daily    Drug rash       RANITIDINE 150 MAX STRENGTH  150 MG tablet   Generic drug:  ranitidine           triamcinolone 0.1 % external cream    KENALOG          * Notice:  This list has 4 medication(s) that are the same as other medications prescribed for you. Read the directions carefully, and ask your doctor or other care provider to review them with you.

## 2018-12-03 NOTE — PROGRESS NOTES
[facility] Dermatology Note      Dermatology Problem List:  1. Atopic dermatitis  -Currently improving. S/p dupixent initiation 10/17/18. Initial concern for drug rash after starting Dupixent, but more consistent with atopic dermatitis flare.    Continue:  - Vanicream over entire body 2-3 times/day  - Dupixent 300mg every other week  - Triamcinolone 0.1% cream BID to eczematous patches 2 times a day for 2 weeks.   - Free and clear shampoos and soaps    Encounter Date: Dec 3, 2018    CC:  Chief Complaint   Patient presents with     Allergies     Mike is here for an allergy follow up         History of Present Illness:  Mr. Mike Naidu is a 68 year old male who presents for follow up of atopic dermatitis.    The patient was last seen 10/29/18. He initially presented with skin itching that began abruptly 3 years ago. Allergic patch testing showed mostly irritant dermatitis with underlying atopic dermatitis with no convincing allergies. Dupixent therapy was initiated at that time.    Pt notes he took first dose of Dupixent 10/17/18 (no previous exposure to this medication). Four days  developed an itchy rash and swelling that was concerning for dupixent drug allergy. However, presentation was more consistent with atopic dermatitis flare. He responded to a prednisone taper and triamcinolone cream.     He has now been on dupixent for about 5 weeks. He says he is starting to see some improvement in his skin, especially on his arms. He is less itchy and feels like his skin is less dry. Some of the redness is also starting to improve.    Past Medical History:   - No history of eczema in childhood. No history of asthma.     History reviewed. No pertinent surgical history.    Social History:  Patient reports that he has never smoked. He has never used smokeless tobacco. 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     Family History:  Atopic  dermatitis in 2 of his grandchildren.     Medications:  Current Outpatient Prescriptions   Medication Sig Dispense Refill     atorvastatin (LIPITOR) 80 MG tablet        cetirizine (ZYRTEC) 10 MG tablet        doxepin (SINEQUAN) 50 MG capsule        Dupilumab (DUPIXENT) 300 MG/2ML syringe Inject 2 mLs (300 mg) Subcutaneous every 14 days 4 mL 5     emollient (VANICREAM) cream Apply topically as needed for other Put on entire skin 2-3 times per day. Very dry skin and might use one jar every 1-2 weeks. 453 g 8     FLOMAX 0.4 MG capsule        folic acid (FOLVITE) 1 MG tablet        gabapentin (NEURONTIN) 300 MG capsule        hydrOXYzine (ATARAX) 25 MG tablet        pantoprazole (PROTONIX) 40 MG EC tablet        predniSONE (DELTASONE) 20 MG tablet Start at 50mg (today and tomorrow morning) and then reduce by 5mg every day ==> therefore after 11 days stop Prednisone  Please provide patient with enough 20mg and 5mg Tabl 22 tablet 0     predniSONE (DELTASONE) 5 MG tablet Take 2 tablets (10 mg) by mouth daily 15 tablet 0     RANITIDINE 150 MAX STRENGTH 150 MG tablet        triamcinolone (KENALOG) 0.1 % cream        Allergies   Allergen Reactions     Procaine Swelling     Sulfa Drugs Swelling and Itching     swelling     Valproic Acid Itching and Rash     hives         Review of Systems:  -Constitutional: Otherwise feeling well today, in usual state of health.  -Skin: As above in HPI. No additional skin concerns.    Physical exam:  Vitals: There were no vitals taken for this visit.  GEN: This is a well developed, well-nourished male in no acute distress, in a pleasant mood.    SKIN: Focused examination of the hands, arms, abdomen, chest, back, and face was performed.  -Skin appears significantly improved since last visit  -No erythema or swelling in face. Facial skin well moisturized  -Hands with some open sores and areas of excoriation, but overall significantly improved  -Arms with some pale stria and redness. Some patches  of dry skin. Some excoriation marks. Also significantly improved.  -Abdomen with no evidence of skin dryness or redness. Large purple skin discoloration at previous dupixent injection site.     Previous Patch Testing for Reference:    Order for PATCH TESTS    [] Outpatient  [] Inpatient: [facility]..../ Bed ....      Skin Atopy (atopic dermatitis) [x] Yes   [] No  Rhinitis/Sinusitis:   [x] Yes   [] No  Allergic Asthma:   [] Yes   [x] No  Food Allergy:   [] Yes   [x] No  Leg ulcers:   [] Yes   [x] No  Hand eczema:   [x] Yes   [] No   Leading hand:   [x] R   [] L       [] Ambidextrous                        Reason for tests (suspected allergy): generalized subacute to chronic eczema with spongiosis and peripheral and skin Eosinophilia  Known previous allergies: Sulphonamides, Valproic acid and Procaine    Standardized panels  [x] Standard panel (40 tests)  [x] Preservatives & Antimicrobials (31 tests)  [x] Emulsifiers & Additives (25 tests)   [] Perfumes/Flavours & Plants (25 tests)  [] Hairdresser panel (12 tests)  [] Rubber Chemicals (22 tests)  [] Plastics (26 tests)  [] Colorants/Dyes/Food additives (20 tests)  [] Metals (implants/dental) (23 tests)  [] Local anaesthetics/NSAIDs (12 tests)  [] Antibiotics & Antimycotics (14 tests)   [x] Corticosteroids (15 tests)   [] Photopatch test (32 tests)   [] others: ...      [] Patient's own products: ...    DO NOT test if chemical or biological identity is unknown!     always ask from patient the product information and safety sheets (MSDS)     [] Patient needs consultation with Allergy team (changes of tests may apply)  [x] Tests discussed with Allergy team (can have direct appointment for patch tests)    RESULTS & EVALUATION of PATCH TESTS    Date/time of application:  Physician/Nurse:  / Felicia Ceballos LPN               Localization of application: Back --> on lower back chronic eczema, but upper back can be used for patch tests. Rather dry there, but no acute  eczema    Patch test readings after     [x] 2 days, [] 3 days [x] 4 days, [] 5 days,   Other duration: ...    Applied patch tests with results (import here the list of patch tests):  Date/time of application:10/01/18   Physician/Nurse:  / Felicia Ceballos LPN               Localization of application: Back     STANDARD Series         No Substance 2 days 4 days remarks   1 Jhony Mix [C] - -    2 Colophony - -    3  2-Mercaptobenzothiazole  - -     4 Methylisothiazolinone - -    5 Carba Mix - -    6 Thiurma Mix [A] - -    7 Bisphenol A Epoxy Resin - -    8 W-Pseo-Kdqdibvknqr-Formaldehyde Resin - -    9 Mercapto Mix [A] - -    10 Black Rubber Mix- PPD [B] - -    11 Potassium Dichromate  + +/++ 5mm erosion   12 Balsam of [place] (Myroxylon Pereirae Resin) - -    13 Nickel Sulphate Hexahydrate +/++ ++ 14mm erosion   14 Mixed Dialkyl Thiourea - -    15 Paraben Mix [B] (+) + 4mm erosion   16 Methyldibromo Glutaronitrile - + 4mm erosion   17 Fragrance Mix - -    18 2-Bromo-2-Nitropropane-1,3-Diol (Bronopol) - -    19 Lyral - -    20 Tixocortol-21- Pivalate - -    21 Diazolidiyl Urea (Germall II) - -    22 Methyl Methacrylate - -    23 Cobalt (II) Chloride Hexahydrate + -    24 Fragrance Mix II  - -    25 Compositae Mix - -    26 Benzoyl Peroxide - -    27 Bacitracin - -    28 Formaldehyde - -    29 Methylchloroisothiazolinone / Methylisothiazolinone - -    30 Corticosteroid Mix - -    31 Sodium Lauryl Sulfate - -    32 Lanolin Alcohol - -    33 Turpentine - -    34 Cetylstearylalcohol - -    35 Chlorhexidine Dicluconate - -    36 Budenoside - -    37 Imidazolidinyl Urea  - -    38 Ethyl-2 Cyanoacrylate - -    39 Quaternium 15 (Dowicil 200) - -    40 Decyl Glucoside - -      PRESERVATIVES & ANTIMICROBIALS         No Substance 2 days 4 days remarks   41  1,2-Benzisothiazoline-3-One, Sodium Salt - -    42  1,3,5-Antwan (2-Hydroxyethyl) - Hexahydrotriazine (aNhid PUENTES) - -    43 3-Laomxtxehwlzr-9-Nitro-1, 3-Propanediol -  -    44  3, 4, 4' - Triclocarban - -    45 4 - Chloro - 3 - Cresol - -    46 4 - Chloro - 4 - Xylenol (PCMX) - -    47 7-Ethylbicyclooxazolidine (ShopSquad/Ownzaan [id number]) - -    48 Benzalkonium Chloride - -    49 Benzyl Alcohol - -    50 Cetalkonium Chloride - -    51 Cetylpyrimidine Chloride  - -    NA Chloroacetamide NA NA    52 DMDM Hydantoin - -    53 Glutaraldehyde - -    54 Triclosan - -    55 Glyoxal Trimeric Dihydrate (+) + 7mm Erosion   56 Iodopropynyl Butylcarbamate - -    57 Octylisothiazoline - -    58 Iodoform - -    59 (Nitrobutyl) Morpholine/(Ethylnitro-Trimethylene) Dimorpholine (ShopSquad/Ownzaan P 1487) - -    60 Phenoxyethanol - -    61 Phenyl Salicylate - -    62 Povidone Iodine - -    63 Sodium Benzoate - -    64 Sodium Disulfite - -    65 Sorbic Acid - -    66 Thimerosal - -     Parabens      67 Butyl-P-Hydroxybenzoate - -    68 Ethyl-P-Hydroxybenzoate - -    69 Methyl-P-Hydroxybenzoate - -    70 Propyl-P-Hydroxybenzoate - -      EMULSIFIERS & ADDITIVES        No Substance 2 days 4 days remarks   71 Polyethylene Glycol-400 (+) -    72 Cocamidopropyl Betaine - -    73 Amerchol L101 - -    74 Propylene Glycol - -    75 Triethanolamine - -    76 Sorbitane Sesquiolate - -    77 Isopropylmyristate (+) -    78 Polysorbate 80  - -    79 Amidoamine   (Stearamidopropyl Dimethylamine) - -    80 Oleamidopropyl Dimethylamine - -    81 Lauryl Glucoside - -    82 Coconut Diethanolamide  - -    83 2-Hydroxy-4-Methoxy Benzophenone (Oxybenzone) - -    84 Benzophenone-4 (Sulisobenzon) - -    85 Propolis - -    86 Dexpanthenol - -    87 Carboxymethyl Cellulose Sodium - -    88 Abitol - -    89 Tert-Butylhydroquinone - -    90 Benzyl Salicylate - -     Antioxidant      91 Dodecyl Gallate - -    92 Butylhydroxyanisole (BHA) - -    93 Butylhydroxytoluene (BHT) - -    94 Di-Alpha-Tocopherol (Vit E) - -    95 Propyl Gallate - -          CORTICOSTEROIDS    No Substance 2 days 4 days remarks Allergy  class   96 Amcinonide - -  B   97  Betametasone-17,21 Dipropionate - -  D1   98 Desoximetasone - -  C   99 Betamethasone-17-Valerate - -  D1   100 Dexamethasone - -  C   101 Hydrocortisone - -  A   102 Clobetasol-17-Propionate - -  D1   103 Dexamethasone-21-Phosphate Disodium Salt - -  C   104 Hydrocortisone-17 Butyrate - -  D2   105 Prednisolone - -  A   106 Mometason Furoate - -  D1   107 Triamcinolone Acetonide - -  B   108 Methylprednisolone Aceponate - -  D2   109 Hydrocortisone-21-Acetate - -  A   110 Prednicarbate - -  D2       Group Characteristics of group Generic name Name  cross reactions   A Hydrocortisone   Cloprednole, Fludrocortisone acétate, Hydrocortison acetate, Methylprednisolone, Prednisolone, Tixocortolpivalate Alfacortone, Fucidin H, Dermacalm, Hexacortone, Premandole, Imacort With group D2   B Triamcinolone-acetonide   Budenoside (R-isomer), Amcinonide, Desonide, Fluocinolone acetonide, Triamcinolone acetonide Locapred, Locatop  Synalar, Pevisone, Ken[place]ort -   C Betamethasone (Without Romina)   Betamethasone, Dexamethasone, Flumethasone pivalate, Halomethasone Daivobet, Dexasalyl, Locasalen,   -   D1 Betamethasone-diproprionate   Betamethasone dipropionate, Betamethasone-17-valerate, Clobetasole-propionate, Fluticasone propionate, Mometasone furoate Betnovate, Diprogenta, Diprosalic, Diprosone, Celestoderm, Fucicort,  Cutivate, Axotide, Elocom -   D2 Methylprednisolone-aceponate   Hydrocortisone-aceponate, Hydrocortisone-buteprate, Hydrocortisone-17-butyrate, Methylprednisolone aceponate, Prednicarbate Locoïd, Advantan,  Prednitop With group A and Budesonide (S-isomer)             Results of patch tests:                         Interpretation:    - Negative                    A    = Allergic      (+) Erythema    TI   = Toxic/irritant   + E + Infiltration    RaP = Relevance at Present     ++ E/I + Papulovesicle   Rpr  = Relevance Previously     +++ E/I/P + Blister     nR   = No Relevance      [] No relevant allergic reaction  observed    [x] Allergic reaction diagnosed against: against metals (Cobalt, Nickel and chromate) and as well angry back --> could go well with atopic dermatitis   ==> the reactions are all erosions and not typical allergic, infiltrated lesions.     Interpretation/ remarks:   First of all, after removal patient has angry back (see photo). Based on that the results are difficult to obtain. However, all the reactions were well defined erosions without infiltration and therefore rather irritant reactions with underlying atopic dermatitis than real allergic reactions.  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 Pteronyssinus [facility]    --> Plan in future prick tests with HDM and moulds       Impression/Plan:  1. Atopic dermatitis    Has been on Dupixent since 10/17/18. He thinks he is starting to see some improvement. Initially 600mg subcutaneously, then 300mg every other week. Initially some concern for drug reaction, but allergic patch testing showed back erosion consistent with atopic dermatitis with no specific allergy    Continue triamcinolone to hands and arms as needed. Use two times a day for 2 weeks.     Continue Vanicream to entire body 2-3 times/day    Use gentle free and clear soaps and shampoos    Discontinue  hydroxyzine. After one week, discontinue ranitidine. One week later, discontinue doxepin. One week later, discontinue cetirizine.    Follow-up in 2 months, earlier for new or changing lesions.     Staff Involved:  I, Radah Floyd ([id number]), saw and examined the patient in the presence of Dr. Lanier.    Staff Physician Comments:  I was present with the medical student who participated in the service and in the documentation of the note. I have verified the history and personally performed the physical exam and medical decision making. I agree with the assessment and plan as documented in the note. I have reviewed and if necessary amended the note.      Leon Lanier MD  Professor  Head of Dermato-Allergy Division  Department of Dermatology  [facility], [facility]  I spent a total of 15 min face to face with Mike Naidu during today's office visit. About 50% of the time was spent counseling the patient and/or coordinating care regarding their allergy.

## 2018-12-04 NOTE — TELEPHONE ENCOUNTER
Patient seen in clinic 12/3/18. Patient states that all that is needed is a copy of the office note sent each visit. Most recent note sent to number provided.

## 2019-01-11 ENCOUNTER — TELEPHONE (OUTPATIENT)
Dept: DERMATOLOGY | Facility: CLINIC | Age: 69
End: 2019-01-11

## 2019-01-11 NOTE — TELEPHONE ENCOUNTER
[facility] Call Center    Phone Message    May a detailed message be left on voicemail: yes    Reason for Call: Other: PT is requesting a refill for Dupilumab (DUPIXENT) 300 MG/2ML syringe but [facility] [place] so [facility] can [facility] it.  Please follow up with the PT at the number above.      Action Taken: Message routed to:  [facility] ([facility] derm

## 2019-01-11 NOTE — TELEPHONE ENCOUNTER
Dupilumab (DUPIXENT) 300 MG/2ML syringe         Patient has active order - [facility] as requested.    Pharmacy also requests progress note be included and they are sent also.    Fax number [phone] Attn Tammy Kathleen M Doege RN

## 2019-01-22 ENCOUNTER — TELEPHONE (OUTPATIENT)
Dept: DERMATOLOGY | Facility: CLINIC | Age: 69
End: 2019-01-22

## 2019-01-22 NOTE — TELEPHONE ENCOUNTER
[facility] Call Center    Phone Message    May a detailed message be left on voicemail: no    Reason for Call: Medication Refill Request    Has the patient contacted the pharmacy for the refill? Yes   Name of medication being requested:Dupilumab (DUPIXENT) 300 MG/2ML syringe    Provider who prescribed the medication:Dr Lanier  [facility]   Date medication is needed:ASAP         Action Taken: Message routed to:  [facility] Dermatology

## 2019-01-22 NOTE — TELEPHONE ENCOUNTER
I called and left Mike OWENS informing him that his rx was [facility] to the [facility].    EDI Anguiano

## 2019-01-24 ENCOUNTER — TELEPHONE (OUTPATIENT)
Dept: DERMATOLOGY | Facility: CLINIC | Age: 69
End: 2019-01-24

## 2019-01-24 DIAGNOSIS — L20.81 ATOPIC NEURODERMATITIS: Primary | ICD-10-CM

## 2019-01-24 NOTE — TELEPHONE ENCOUNTER
I spoke with the patient who was at the pharmacy and I let him know that according to the RX on file. He should have enough of the RX. The pharmacist stated that he would make sure that he has enough but if he doesn't they will call us.   Jeniffer Cali, [facility]

## 2019-01-24 NOTE — TELEPHONE ENCOUNTER
The patient called back and stated that the [facility] will not [facility] a [facility] of his medication because he has ran out of refills. He would [facility] a [facility]. He does have one injection left for 02/26/19.       Fax: [phone]  Or Fax: [phone]    Please send new RX.   Jeniffer Cali, CMA

## 2019-02-11 ENCOUNTER — DOCUMENTATION ONLY (OUTPATIENT)
Dept: CARE COORDINATION | Facility: CLINIC | Age: 69
End: 2019-02-11

## 2019-03-18 ENCOUNTER — OFFICE VISIT (OUTPATIENT)
Dept: DERMATOLOGY | Facility: CLINIC | Age: 69
End: 2019-03-18
Payer: COMMERCIAL

## 2019-03-18 DIAGNOSIS — L20.89 OTHER ATOPIC DERMATITIS: Primary | ICD-10-CM

## 2019-03-18 ASSESSMENT — PAIN SCALES - GENERAL: PAINLEVEL: NO PAIN (0)

## 2019-03-18 NOTE — NURSING NOTE
Dermatology Rooming Note    Mike Naidu's goals for this visit include:   Chief Complaint   Patient presents with     Derm Problem     Chika is here for follow up dermatitis     Domi Salvador, CMA

## 2019-03-18 NOTE — LETTER
RE: Mike Naidu  [address] [facility] [place] [address]     Dear Colleague,    Thank you for referring your patient, Mike Naidu, to the [facility] DERMATOLOGY at [facility]. Please see a copy of my visit note below.    [facility] Dermatology Note      Dermatology Problem List:  1. Atopic dermatitis  -Currently improving. S/p dupixent initiation 10/17/18. Initial concern for drug rash after starting Dupixent, but more consistent with atopic dermatitis flare.    Continue:  - Vanicream over entire body 2-3 times/day  - Dupixent 300mg every other week  - Triamcinolone 0.1% cream BID to eczematous patches 2 times a day for 2 weeks.   - Free and clear shampoos and soaps    Encounter Date: Mar 18, 2019    CC:  Chief Complaint   Patient presents with     Derm Problem     Chika is here for follow up dermatitis         History of Present Illness:  Mr. Mike Naidu is a 68 year old male who presents for follow up of atopic dermatitis.    The patient was last seen 12/03/2018. He initially presented with skin itching that began abruptly 3 years ago. Allergic patch testing showed mostly irritant dermatitis with underlying atopic dermatitis with no convincing allergies. Dupixent therapy was initiated at that time.    Since his last appointment, during January and February it was much improved.     However, the patient reports that since last Wednesday, he broke out on all his forearms. He is still on Dupixent, he has another injection coming up on Wednesday. He has been using Vanicream and triamcinolone (during itchy flares). He drank out of an aluminum coke cans (since last Monday prior to the reaction), and his wife got mad that he was drinking out of metals, and told him he should not.     For itching, patient has been taking cetrizine and doxepin 25 mg as needed. He reports that overall, his itch has much improved.     No other concerns  addressed today.      Past Medical History:   - No history of eczema in childhood. No history of asthma.     No past surgical history on file.    Social History:  Patient reports that  has never smoked. he has never used smokeless tobacco. 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     Family History:  Atopic dermatitis in 2 of his grandchildren.     Medications:  Current Outpatient Medications   Medication Sig Dispense Refill     cetirizine (ZYRTEC) 10 MG tablet        doxepin (SINEQUAN) 50 MG capsule        Dupilumab (DUPIXENT) 300 MG/2ML syringe Inject 2 mLs (300 mg) Subcutaneous every 14 days for 12 doses 4 mL 5     emollient (VANICREAM) cream Apply topically as needed for other Put on entire skin 2-3 times per day. Very dry skin and might use one jar every 1-2 weeks. 453 g 8     emollient (VANICREAM) external cream Apply topically 3 times daily Apply to entire body three times a day to help maintain skin barrier. 453 g 3     folic acid (FOLVITE) 1 MG tablet        gabapentin (NEURONTIN) 300 MG capsule        triamcinolone (KENALOG) 0.1 % cream        atorvastatin (LIPITOR) 80 MG tablet        FLOMAX 0.4 MG capsule        hydrOXYzine (ATARAX) 25 MG tablet        pantoprazole (PROTONIX) 40 MG EC tablet        predniSONE (DELTASONE) 20 MG tablet Start at 50mg (today and tomorrow morning) and then reduce by 5mg every day ==> therefore after 11 days stop Prednisone  Please provide patient with enough 20mg and 5mg Tabl (Patient not taking: Reported on 3/18/2019.) 22 tablet 0     predniSONE (DELTASONE) 5 MG tablet Take 2 tablets (10 mg) by mouth daily (Patient not taking: Reported on 3/18/2019.) 15 tablet 0     RANITIDINE 150 MAX STRENGTH 150 MG tablet        Allergies   Allergen Reactions     Procaine Swelling     Sulfa Drugs Swelling and Itching     swelling     Valproic Acid Itching and Rash     hives     Physical exam:  Vitals: There were no vitals taken for this  visit.  GEN: This is a well developed, well-nourished male in no acute distress, in a pleasant mood.    SKIN: Focused examination of the hands, arms, back, and face was performed.  - atopic dermatitis clearly reduced with much less pruritus and erythema. Skin still lichenified and on the arms and trunk since 1 weeks suddenly a little bit of recurrence of a subacute eczema  But in general clear improvement of the dermatitis      Previous Patch Testing for Reference:    Order for PATCH TESTS    [] Outpatient  [] Inpatient: [facility]..../ Bed ....      Skin Atopy (atopic dermatitis) [x] Yes   [] No  Rhinitis/Sinusitis:   [x] Yes   [] No  Allergic Asthma:   [] Yes   [x] No  Food Allergy:   [] Yes   [x] No  Leg ulcers:   [] Yes   [x] No  Hand eczema:   [x] Yes   [] No   Leading hand:   [x] R   [] L       [] Ambidextrous                        Reason for tests (suspected allergy): generalized subacute to chronic eczema with spongiosis and peripheral and skin Eosinophilia  Known previous allergies: Sulphonamides, Valproic acid and Procaine    Standardized panels  [x] Standard panel (40 tests)  [x] Preservatives & Antimicrobials (31 tests)  [x] Emulsifiers & Additives (25 tests)   [] Perfumes/Flavours & Plants (25 tests)  [] Hairdresser panel (12 tests)  [] Rubber Chemicals (22 tests)  [] Plastics (26 tests)  [] Colorants/Dyes/Food additives (20 tests)  [] Metals (implants/dental) (23 tests)  [] Local anaesthetics/NSAIDs (12 tests)  [] Antibiotics & Antimycotics (14 tests)   [x] Corticosteroids (15 tests)   [] Photopatch test (32 tests)   [] others: ...      [] Patient's own products: ...    DO NOT test if chemical or biological identity is unknown!     always ask from patient the product information and safety sheets (MSDS)     [] Patient needs consultation with Allergy team (changes of tests may apply)  [x] Tests discussed with Allergy team (can have direct appointment for patch tests)    RESULTS & EVALUATION of PATCH  TESTS    Date/time of application:  Physician/Nurse:  / Felicia Ceballos LPN               Localization of application: Back --> on lower back chronic eczema, but upper back can be used for patch tests. Rather dry there, but no acute eczema    Patch test readings after     [x] 2 days, [] 3 days [x] 4 days, [] 5 days,   Other duration: ...    Applied patch tests with results (import here the list of patch tests):  Date/time of application:10/01/18   Physician/Nurse:  / Felicia Ceballos LPN               Localization of application: Back     STANDARD Series         No Substance 2 days 4 days remarks   1 Jhony Mix [C] - -    2 Colophony - -    3  2-Mercaptobenzothiazole  - -     4 Methylisothiazolinone - -    5 Carba Mix - -    6 Thiurma Mix [A] - -    7 Bisphenol A Epoxy Resin - -    8 V-Ftjm-Klarrchoerb-Formaldehyde Resin - -    9 Mercapto Mix [A] - -    10 Black Rubber Mix- PPD [B] - -    11 Potassium Dichromate  + +/++ 5mm erosion   12 Balsam of [place] (Myroxylon Pereirae Resin) - -    13 Nickel Sulphate Hexahydrate +/++ ++ 14mm erosion   14 Mixed Dialkyl Thiourea - -    15 Paraben Mix [B] (+) + 4mm erosion   16 Methyldibromo Glutaronitrile - + 4mm erosion   17 Fragrance Mix - -    18 2-Bromo-2-Nitropropane-1,3-Diol (Bronopol) - -    19 Lyral - -    20 Tixocortol-21- Pivalate - -    21 Diazolidiyl Urea (Germall II) - -    22 Methyl Methacrylate - -    23 Cobalt (II) Chloride Hexahydrate + -    24 Fragrance Mix II  - -    25 Compositae Mix - -    26 Benzoyl Peroxide - -    27 Bacitracin - -    28 Formaldehyde - -    29 Methylchloroisothiazolinone / Methylisothiazolinone - -    30 Corticosteroid Mix - -    31 Sodium Lauryl Sulfate - -    32 Lanolin Alcohol - -    33 Turpentine - -    34 Cetylstearylalcohol - -    35 Chlorhexidine Dicluconate - -    36 Budenoside - -    37 Imidazolidinyl Urea  - -    38 Ethyl-2 Cyanoacrylate - -    39 Quaternium 15 (Dowicil 200) - -    40 Decyl Glucoside - -       PRESERVATIVES & ANTIMICROBIALS         No Substance 2 days 4 days remarks   41  1,2-Benzisothiazoline-3-One, Sodium Salt - -    42  1,3,5-Antwan (2-Hydroxyethyl) - Hexahydrotriazine (Grotan BK) - -    43 3-Mejiabqygpgkv-0-Nitro-1, 3-Propanediol - -    44  3, 4, 4' - Triclocarban - -    45 4 - Chloro - 3 - Cresol - -    46 4 - Chloro - 4 - Xylenol (PCMX) - -    47 7-Ethylbicyclooxazolidine (Bioban WB4871) - -    48 Benzalkonium Chloride - -    49 Benzyl Alcohol - -    50 Cetalkonium Chloride - -    51 Cetylpyrimidine Chloride  - -    NA Chloroacetamide NA NA    52 DMDM Hydantoin - -    53 Glutaraldehyde - -    54 Triclosan - -    55 Glyoxal Trimeric Dihydrate (+) + 7mm Erosion   56 Iodopropynyl Butylcarbamate - -    57 Octylisothiazoline - -    58 Iodoform - -    59 (Nitrobutyl) Morpholine/(Ethylnitro-Trimethylene) Dimorpholine (Bioban P 1487) - -    60 Phenoxyethanol - -    61 Phenyl Salicylate - -    62 Povidone Iodine - -    63 Sodium Benzoate - -    64 Sodium Disulfite - -    65 Sorbic Acid - -    66 Thimerosal - -     Parabens      67 Butyl-P-Hydroxybenzoate - -    68 Ethyl-P-Hydroxybenzoate - -    69 Methyl-P-Hydroxybenzoate - -    70 Propyl-P-Hydroxybenzoate - -      EMULSIFIERS & ADDITIVES        No Substance 2 days 4 days remarks   71 Polyethylene Glycol-400 (+) -    72 Cocamidopropyl Betaine - -    73 Amerchol L101 - -    74 Propylene Glycol - -    75 Triethanolamine - -    76 Sorbitane Sesquiolate - -    77 Isopropylmyristate (+) -    78 Polysorbate 80  - -    79 Amidoamine   (Stearamidopropyl Dimethylamine) - -    80 Oleamidopropyl Dimethylamine - -    81 Lauryl Glucoside - -    82 Coconut Diethanolamide  - -    83 2-Hydroxy-4-Methoxy Benzophenone (Oxybenzone) - -    84 Benzophenone-4 (Sulisobenzon) - -    85 Propolis - -    86 Dexpanthenol - -    87 Carboxymethyl Cellulose Sodium - -    88 Abitol - -    89 Tert-Butylhydroquinone - -    90 Benzyl Salicylate - -     Antioxidant      91 Dodecyl Gallate -  -    92 Butylhydroxyanisole (BHA) - -    93 Butylhydroxytoluene (BHT) - -    94 Di-Alpha-Tocopherol (Vit E) - -    95 Propyl Gallate - -          CORTICOSTEROIDS    No Substance 2 days 4 days remarks Allergy  class   96 Amcinonide - -  B   97 Betametasone-17,21 Dipropionate - -  D1   98 Desoximetasone - -  C   99 Betamethasone-17-Valerate - -  D1   100 Dexamethasone - -  C   101 Hydrocortisone - -  A   102 Clobetasol-17-Propionate - -  D1   103 Dexamethasone-21-Phosphate Disodium Salt - -  C   104 Hydrocortisone-17 Butyrate - -  D2   105 Prednisolone - -  A   106 Mometason Furoate - -  D1   107 Triamcinolone Acetonide - -  B   108 Methylprednisolone Aceponate - -  D2   109 Hydrocortisone-21-Acetate - -  A   110 Prednicarbate - -  D2       Group Characteristics of group Generic name Name  cross reactions   A Hydrocortisone   Cloprednole, Fludrocortisone acétate, Hydrocortison acetate, Methylprednisolone, Prednisolone, Tixocortolpivalate Alfacortone, Fucidin H, Dermacalm, Hexacortone, Premandole, Imacort With group D2   B Triamcinolone-acetonide   Budenoside (R-isomer), Amcinonide, Desonide, Fluocinolone acetonide, Triamcinolone acetonide Locapred, Locatop  Synalar, Pevisone, [place] -   C Betamethasone (Without Romina)   Betamethasone, Dexamethasone, Flumethasone pivalate, Halomethasone Daivobet, Dexasalyl, Locasalen,   -   D1 Betamethasone-diproprionate   Betamethasone dipropionate, Betamethasone-17-valerate, Clobetasole-propionate, Fluticasone propionate, Mometasone furoate Betnovate, Diprogenta, Diprosalic, Diprosone, Celestoderm, Fucicort,  Cutivate, Axotide, Elocom -   D2 Methylprednisolone-aceponate   Hydrocortisone-aceponate, Hydrocortisone-buteprate, Hydrocortisone-17-butyrate, Methylprednisolone aceponate, Prednicarbate Locoïd, Advantan,  Prednitop With group A and Budesonide (S-isomer)     Results of patch tests:                         Interpretation:    - Negative                    A    =  Allergic      (+) Erythema    TI   = Toxic/irritant   + E + Infiltration    RaP = Relevance at Present     ++ E/I + Papulovesicle   Rpr  = Relevance Previously     +++ E/I/P + Blister     nR   = No Relevance    [] No relevant allergic reaction observed    [x] Allergic reaction diagnosed against: against metals (Cobalt, Nickel and chromate) and as well angry back --> could go well with atopic dermatitis   ==> the reactions are all erosions and not typical allergic, infiltrated lesions.     Interpretation/ remarks:   First of all, after removal patient has angry back (see photo). Based on that the results are difficult to obtain. However, all the reactions were well defined erosions without infiltration and therefore rather irritant reactions with underlying atopic dermatitis than real allergic reactions.  The only real reaction seems to be to the metals, particularly Nickel, which would go well with atopic dermatitis    Final Diagnosis:  - Generalized, subacute to chronic eczema with spongiosis and peripheral and blood Eosinophilia ==> probably atopic dermatitis   --> some indications for allergic contact dermatitis to nickel and several, mostly irritant reactions to Parabens,  Methyldibromo Gluturonitrile and Glyoxal Trimeric Dihydrate   DDx  drug allergy (Atorvastatin, Pantoprazole), cutaneous T cell Lymphoma (bone marrow in [place] normal)    Total IgE, CBC, spec IgE D. Pteronyssinus --> [facility]?    --> Plan in future prick tests with HDM and moulds     Impression/Plan:    >> severe exantematic, generalized Atopic Dermatitis     Has been on Dupixent since 10/17/18. He thinks he is starting to see some improvement. Initially 600mg subcutaneously, then 300mg every other week. Initially some concern for drug reaction, but allergic patch testing showed back erosion consistent with atopic dermatitis with no specific allergy. Prescribed Dupixent for 6 months.    Patient scheduled to follow up in 4-5 months to decide how  to continue treatment with the dupixent. Might have to repeat patch test, because prior was done in an acute eczematous phase, so results were not clear on parabens and Methyldibromo Glutaronitrile. Would like to repeat testing while patient is stable.     [facility] will check CBC, kidney, liver, and inflammatory panels every couple months, as scheduled.     Continue triamcinolone 0.1% cream to hands and arms as needed. Use two times a day for 1 week.     Continue Vanicream to entire body 2-3 times/day    Use gentle free and clear soaps and shampoos    Suspicious for allergy of metals in the coke can, or additives in the coke. Advised patient to avoid  drinking pop out of cans.     Discontinue doxepin. Patient can continue cetirizine daily. In 3 weeks, stop doxepin, or take it only as needed. Patient seems to have Mirtazapine and we don't want interactions.      Follow-up in 4 months, earlier for new or changing lesions.     Staff Involved:  Scribe/Staff    Scribe Disclosure  I, Rimma Amador, am serving as a scribe to document services personally performed by Dr. Leon Lanier MD, based on data collection and the provider's statements to me.     I spent a total of 25 min face to face with Mike Naidu during today's office visit. About 50% of the time was spent counseling the patient and/or coordinating care regarding their allergy.    Again, thank you for allowing me to participate in the care of your patient.      Sincerely,    Leon Lanier MD

## 2019-03-18 NOTE — PROGRESS NOTES
[facility] Dermatology Note      Dermatology Problem List:  1. Atopic dermatitis  -Currently improving. S/p dupixent initiation 10/17/18. Initial concern for drug rash after starting Dupixent, but more consistent with atopic dermatitis flare.    Continue:  - Vanicream over entire body 2-3 times/day  - Dupixent 300mg every other week  - Triamcinolone 0.1% cream BID to eczematous patches 2 times a day for 2 weeks.   - Free and clear shampoos and soaps    Encounter Date: Mar 18, 2019    CC:  Chief Complaint   Patient presents with     Derm Problem     Chika is here for follow up dermatitis         History of Present Illness:  Mr. Mike Naidu is a 68 year old male who presents for follow up of atopic dermatitis.    The patient was last seen 12/03/2018. He initially presented with skin itching that began abruptly 3 years ago. Allergic patch testing showed mostly irritant dermatitis with underlying atopic dermatitis with no convincing allergies. Dupixent therapy was initiated at that time.    Since his last appointment, during January and February it was much improved.     However, the patient reports that since last Wednesday, he broke out on all his forearms. He is still on Dupixent, he has another injection coming up on Wednesday. He has been using Vanicream and triamcinolone (during itchy flares). He drank out of an aluminum coke cans (since last Monday prior to the reaction), and his wife got mad that he was drinking out of metals, and told him he should not.     For itching, patient has been taking cetrizine and doxepin 25 mg as needed. He reports that overall, his itch has much improved.     No other concerns addressed today.      Past Medical History:   - No history of eczema in childhood. No history of asthma.     No past surgical history on file.    Social History:  Patient reports that  has never smoked. he has never used smokeless tobacco. 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     Family History:  Atopic dermatitis in 2 of his grandchildren.     Medications:  Current Outpatient Medications   Medication Sig Dispense Refill     cetirizine (ZYRTEC) 10 MG tablet        doxepin (SINEQUAN) 50 MG capsule        Dupilumab (DUPIXENT) 300 MG/2ML syringe Inject 2 mLs (300 mg) Subcutaneous every 14 days for 12 doses 4 mL 5     emollient (VANICREAM) cream Apply topically as needed for other Put on entire skin 2-3 times per day. Very dry skin and might use one jar every 1-2 weeks. 453 g 8     emollient (VANICREAM) external cream Apply topically 3 times daily Apply to entire body three times a day to help maintain skin barrier. 453 g 3     folic acid (FOLVITE) 1 MG tablet        gabapentin (NEURONTIN) 300 MG capsule        triamcinolone (KENALOG) 0.1 % cream        atorvastatin (LIPITOR) 80 MG tablet        FLOMAX 0.4 MG capsule        hydrOXYzine (ATARAX) 25 MG tablet        pantoprazole (PROTONIX) 40 MG EC tablet        predniSONE (DELTASONE) 20 MG tablet Start at 50mg (today and tomorrow morning) and then reduce by 5mg every day ==> therefore after 11 days stop Prednisone  Please provide patient with enough 20mg and 5mg Tabl (Patient not taking: Reported on 3/18/2019.) 22 tablet 0     predniSONE (DELTASONE) 5 MG tablet Take 2 tablets (10 mg) by mouth daily (Patient not taking: Reported on 3/18/2019.) 15 tablet 0     RANITIDINE 150 MAX STRENGTH 150 MG tablet        Allergies   Allergen Reactions     Procaine Swelling     Sulfa Drugs Swelling and Itching     swelling     Valproic Acid Itching and Rash     hives         Review of Systems:  -Constitutional: Otherwise feeling well today, in usual state of health.  -Skin: As above in HPI. No additional skin concerns.    Physical exam:  Vitals: There were no vitals taken for this visit.  GEN: This is a well developed, well-nourished male in no acute distress, in a pleasant mood.    SKIN: Focused  examination of the hands, arms, back, and face was performed.  - atopic dermatitis clearly reduced with much less pruritus and erythema. Skin still lichenified and on the arms and trunk since 1 weeks suddenly a little bit of recurrence of a subacute eczema  But in general clear improvement of the dermatitis      Previous Patch Testing for Reference:    Order for PATCH TESTS    [] Outpatient  [] Inpatient: [facility]..../ Bed ....      Skin Atopy (atopic dermatitis) [x] Yes   [] No  Rhinitis/Sinusitis:   [x] Yes   [] No  Allergic Asthma:   [] Yes   [x] No  Food Allergy:   [] Yes   [x] No  Leg ulcers:   [] Yes   [x] No  Hand eczema:   [x] Yes   [] No   Leading hand:   [x] R   [] L       [] Ambidextrous                        Reason for tests (suspected allergy): generalized subacute to chronic eczema with spongiosis and peripheral and skin Eosinophilia  Known previous allergies: Sulphonamides, Valproic acid and Procaine    Standardized panels  [x] Standard panel (40 tests)  [x] Preservatives & Antimicrobials (31 tests)  [x] Emulsifiers & Additives (25 tests)   [] Perfumes/Flavours & Plants (25 tests)  [] Hairdresser panel (12 tests)  [] Rubber Chemicals (22 tests)  [] Plastics (26 tests)  [] Colorants/Dyes/Food additives (20 tests)  [] Metals (implants/dental) (23 tests)  [] Local anaesthetics/NSAIDs (12 tests)  [] Antibiotics & Antimycotics (14 tests)   [x] Corticosteroids (15 tests)   [] Photopatch test (32 tests)   [] others: ...      [] Patient's own products: ...    DO NOT test if chemical or biological identity is unknown!     always ask from patient the product information and safety sheets (MSDS)     [] Patient needs consultation with Allergy team (changes of tests may apply)  [x] Tests discussed with Allergy team (can have direct appointment for patch tests)    RESULTS & EVALUATION of PATCH TESTS    Date/time of application:  Physician/Nurse:  / Felicia Ceballos LPN               Localization of  application: Back --> on lower back chronic eczema, but upper back can be used for patch tests. Rather dry there, but no acute eczema    Patch test readings after     [x] 2 days, [] 3 days [x] 4 days, [] 5 days,   Other duration: ...    Applied patch tests with results (import here the list of patch tests):  Date/time of application:10/01/18   Physician/Nurse:  / Felicia Ceballos LPN               Localization of application: Back     STANDARD Series         No Substance 2 days 4 days remarks   1 Jhony Mix [C] - -    2 Colophony - -    3  2-Mercaptobenzothiazole  - -     4 Methylisothiazolinone - -    5 Carba Mix - -    6 Thiurma Mix [A] - -    7 Bisphenol A Epoxy Resin - -    8 I-Attz-Dmsqtfpgstp-Formaldehyde Resin - -    9 Mercapto Mix [A] - -    10 Black Rubber Mix- PPD [B] - -    11 Potassium Dichromate  + +/++ 5mm erosion   12 Balsam of [place] (Myroxylon Pereirae Resin) - -    13 Nickel Sulphate Hexahydrate +/++ ++ 14mm erosion   14 Mixed Dialkyl Thiourea - -    15 Paraben Mix [B] (+) + 4mm erosion   16 Methyldibromo Glutaronitrile - + 4mm erosion   17 Fragrance Mix - -    18 2-Bromo-2-Nitropropane-1,3-Diol (Bronopol) - -    19 Lyral - -    20 Tixocortol-21- Pivalate - -    21 Diazolidiyl Urea (Germall II) - -    22 Methyl Methacrylate - -    23 Cobalt (II) Chloride Hexahydrate + -    24 Fragrance Mix II  - -    25 Compositae Mix - -    26 Benzoyl Peroxide - -    27 Bacitracin - -    28 Formaldehyde - -    29 Methylchloroisothiazolinone / Methylisothiazolinone - -    30 Corticosteroid Mix - -    31 Sodium Lauryl Sulfate - -    32 Lanolin Alcohol - -    33 Turpentine - -    34 Cetylstearylalcohol - -    35 Chlorhexidine Dicluconate - -    36 Budenoside - -    37 Imidazolidinyl Urea  - -    38 Ethyl-2 Cyanoacrylate - -    39 Quaternium 15 (Dowicil 200) - -    40 Decyl Glucoside - -      PRESERVATIVES & ANTIMICROBIALS         No Substance 2 days 4 days remarks   41  1,2-Benzisothiazoline-3-One, Sodium  Salt - -    42  1,3,5-Antwan (2-Hydroxyethyl) - Hexahydrotriazine (Grotan BK) - -    43 7-Jjstrrjovglzv-7-Nitro-1, 3-Propanediol - -    44  3, 4, 4' - Triclocarban - -    45 4 - Chloro - 3 - Cresol - -    46 4 - Chloro - 4 - Xylenol (PCMX) - -    47 7-Ethylbicyclooxazolidine (Bioban HP3026) - -    48 Benzalkonium Chloride - -    49 Benzyl Alcohol - -    50 Cetalkonium Chloride - -    51 Cetylpyrimidine Chloride  - -    NA Chloroacetamide NA NA    52 DMDM Hydantoin - -    53 Glutaraldehyde - -    54 Triclosan - -    55 Glyoxal Trimeric Dihydrate (+) + 7mm Erosion   56 Iodopropynyl Butylcarbamate - -    57 Octylisothiazoline - -    58 Iodoform - -    59 (Nitrobutyl) Morpholine/(Ethylnitro-Trimethylene) Dimorpholine (Bioban P 1487) - -    60 Phenoxyethanol - -    61 Phenyl Salicylate - -    62 Povidone Iodine - -    63 Sodium Benzoate - -    64 Sodium Disulfite - -    65 Sorbic Acid - -    66 Thimerosal - -     Parabens      67 Butyl-P-Hydroxybenzoate - -    68 Ethyl-P-Hydroxybenzoate - -    69 Methyl-P-Hydroxybenzoate - -    70 Propyl-P-Hydroxybenzoate - -      EMULSIFIERS & ADDITIVES        No Substance 2 days 4 days remarks   71 Polyethylene Glycol-400 (+) -    72 Cocamidopropyl Betaine - -    73 Amerchol L101 - -    74 Propylene Glycol - -    75 Triethanolamine - -    76 Sorbitane Sesquiolate - -    77 Isopropylmyristate (+) -    78 Polysorbate 80  - -    79 Amidoamine   (Stearamidopropyl Dimethylamine) - -    80 Oleamidopropyl Dimethylamine - -    81 Lauryl Glucoside - -    82 Coconut Diethanolamide  - -    83 2-Hydroxy-4-Methoxy Benzophenone (Oxybenzone) - -    84 Benzophenone-4 (Sulisobenzon) - -    85 Propolis - -    86 Dexpanthenol - -    87 Carboxymethyl Cellulose Sodium - -    88 Abitol - -    89 Tert-Butylhydroquinone - -    90 Benzyl Salicylate - -     Antioxidant      91 Dodecyl Gallate - -    92 Butylhydroxyanisole (BHA) - -    93 Butylhydroxytoluene (BHT) - -    94 Di-Alpha-Tocopherol (Vit E) - -    95  Propyl Gallate - -          CORTICOSTEROIDS    No Substance 2 days 4 days remarks Allergy  class   96 Amcinonide - -  B   97 Betametasone-17,21 Dipropionate - -  D1   98 Desoximetasone - -  C   99 Betamethasone-17-Valerate - -  D1   100 Dexamethasone - -  C   101 Hydrocortisone - -  A   102 Clobetasol-17-Propionate - -  D1   103 Dexamethasone-21-Phosphate Disodium Salt - -  C   104 Hydrocortisone-17 Butyrate - -  D2   105 Prednisolone - -  A   106 Mometason Furoate - -  D1   107 Triamcinolone Acetonide - -  B   108 Methylprednisolone Aceponate - -  D2   109 Hydrocortisone-21-Acetate - -  A   110 Prednicarbate - -  D2       Group Characteristics of group Generic name Name  cross reactions   A Hydrocortisone   Cloprednole, Fludrocortisone acétate, Hydrocortison acetate, Methylprednisolone, Prednisolone, Tixocortolpivalate Alfacortone, Fucidin H, Dermacalm, Hexacortone, Premandole, Imacort With group D2   B Triamcinolone-acetonide   Budenoside (R-isomer), Amcinonide, Desonide, Fluocinolone acetonide, Triamcinolone acetonide Locapred, Locatop  Synalar, Pevisone, [place] -   C Betamethasone (Without Romina)   Betamethasone, Dexamethasone, Flumethasone pivalate, Halomethasone Daivobet, Dexasalyl, Locasalen,   -   D1 Betamethasone-diproprionate   Betamethasone dipropionate, Betamethasone-17-valerate, Clobetasole-propionate, Fluticasone propionate, Mometasone furoate Betnovate, Diprogenta, Diprosalic, Diprosone, Celestoderm, Fucicort,  Cutivate, Axotide, Elocom -   D2 Methylprednisolone-aceponate   Hydrocortisone-aceponate, Hydrocortisone-buteprate, Hydrocortisone-17-butyrate, Methylprednisolone aceponate, Prednicarbate Locoïd, Advantan,  Prednitop With group A and Budesonide (S-isomer)             Results of patch tests:                         Interpretation:    - Negative                    A    = Allergic      (+) Erythema    TI   = Toxic/irritant   + E + Infiltration    RaP = Relevance at Present     ++ E/I +  Papulovesicle   Rpr  = Relevance Previously     +++ E/I/P + Blister     nR   = No Relevance      [] No relevant allergic reaction observed    [x] Allergic reaction diagnosed against: against metals (Cobalt, Nickel and chromate) and as well angry back --> could go well with atopic dermatitis   ==> the reactions are all erosions and not typical allergic, infiltrated lesions.     Interpretation/ remarks:   First of all, after removal patient has angry back (see photo). Based on that the results are difficult to obtain. However, all the reactions were well defined erosions without infiltration and therefore rather irritant reactions with underlying atopic dermatitis than real allergic reactions.  The only real reaction seems to be to the metals, particularly Nickel, which would go well with atopic dermatitis    Final Diagnosis:  - Generalized, subacute to chronic eczema with spongiosis and peripheral and blood Eosinophilia ==> probably atopic dermatitis   --> some indications for allergic contact dermatitis to nickel and several, mostly irritant reactions to Parabens,  Methyldibromo Gluturonitrile and Glyoxal Trimeric Dihydrate   DDx  drug allergy (Atorvastatin, Pantoprazole), cutaneous T cell Lymphoma (bone marrow in [place] normal)    Total IgE, CBC, spec IgE D. Pteronyssinus --> [facility]    --> Plan in future prick tests with HDM and moulds       Impression/Plan:    >> severe exantematic, generalized Atopic Dermatitis     Has been on Dupixent since 10/17/18. He thinks he is starting to see some improvement. Initially 600mg subcutaneously, then 300mg every other week. Initially some concern for drug reaction, but allergic patch testing showed back erosion consistent with atopic dermatitis with no specific allergy. Prescribed Dupixent for 6 months.    Patient scheduled to follow up in 4-5 months to decide how to continue treatment with the dupixent. Might have to repeat patch test, because prior was done in an acute  eczematous phase, so results were not clear on parabens and Methyldibromo Glutaronitrile. Would like to repeat testing while patient is stable.     [facility], [facility], liver, and inflammatory panels every couple months, as scheduled.     Continue triamcinolone 0.1% cream to hands and arms as needed. Use two times a day for 1 week.     Continue Vanicream to entire body 2-3 times/day    Use gentle free and clear soaps and shampoos    Suspicious for allergy of metals in the coke can, or additives in the coke. Advised patient to avoid  drinking pop out of cans.     Discontinue doxepin. Patient can continue cetirizine daily. In 3 weeks, stop doxepin, or take it only as needed. Patient seems to have Mirtazapine and we don't want interactions.      Follow-up in 4 months, earlier for new or changing lesions.     Staff Involved:  Scribe/Staff    Scribe Disclosure  I, Rimma Amador, am serving as a scribe to document services personally performed by Dr. Leon Lanier MD, based on data collection and the provider's statements to me.     I spent a total of 25 min face to face with Mike Naidu during today's office visit. About 50% of the time was spent counseling the patient and/or coordinating care regarding their allergy.

## 2019-05-08 ENCOUNTER — TELEPHONE (OUTPATIENT)
Dept: DERMATOLOGY | Facility: CLINIC | Age: 69
End: 2019-05-08

## 2019-05-08 NOTE — TELEPHONE ENCOUNTER
Patient needs to be scheduled for prick allergy testing.      Wednesday 15 minutes return allergy     No antihistamines for at least a week prior to test and no oral steroids for at least a month.

## 2019-05-10 ENCOUNTER — TELEPHONE (OUTPATIENT)
Dept: DERMATOLOGY | Facility: CLINIC | Age: 69
End: 2019-05-10

## 2019-05-10 DIAGNOSIS — L20.89 OTHER ATOPIC DERMATITIS: Primary | ICD-10-CM

## 2019-05-10 NOTE — TELEPHONE ENCOUNTER
[facility] Call Center    Phone Message    May a detailed message be left on voicemail: yes    Reason for Call: Other: Patient said he received a phone call today, voicemail was left, asking him if he wanted to do patch testing.  Patient would be interested IF the referral [facility] the [facility] has been received.  Please check to see if referral received and then reach out to the patient.      Action Taken: Message routed to:  [facility]): dermatology

## 2019-06-22 NOTE — TELEPHONE ENCOUNTER
[phone]   Coverage will need to be checked with [facility] under [facility]      Fax number is [phone] For dr Sibley's staff    An order for all tests and follow up is needed to get a PA started for [facility] with this [facility]. This neds to be As detailed as possible to get coverage.    Dr Gale Garrett requests procedure to be scheduled as outpatient in ultrasound Monday or Tuesday with radiologist.

## 2019-07-08 ENCOUNTER — OFFICE VISIT (OUTPATIENT)
Dept: DERMATOLOGY | Facility: CLINIC | Age: 69
End: 2019-07-08
Payer: COMMERCIAL

## 2019-07-08 DIAGNOSIS — L20.89 OTHER ATOPIC DERMATITIS: ICD-10-CM

## 2019-07-08 DIAGNOSIS — L21.9 DERMATITIS, SEBORRHEIC: Primary | ICD-10-CM

## 2019-07-08 RX ORDER — KETOCONAZOLE 20 MG/ML
SHAMPOO TOPICAL
Qty: 120 ML | Refills: 1 | Status: SHIPPED | OUTPATIENT
Start: 2019-07-09 | End: 2020-02-26

## 2019-07-08 ASSESSMENT — PAIN SCALES - GENERAL: PAINLEVEL: NO PAIN (0)

## 2019-07-08 NOTE — PATIENT INSTRUCTIONS
DUST MITE ALLERGY  Dust mites are microscopic bugs that live in dust, feeding on dead skin from our bodies. Dust mites flourish in warm, humid environments and therefore are highest  in number in carpeting, pillows and mattresses.     Tips:    Encase pillows, mattresses, and box springs in zippered allergy proof covers.    Wash all bed linens in at least 1300 F every week.    Remove stuffed animals or freeze them every other week.     Remove upholstered furniture from the bedroom and consider removing the carpet.     Keep ceiling fans off in the bedroom as they can stir up dust mite allergens.    Frequently dust and vacuum the house, especially the bedroom.    Acaracides (chemicals which kill mites) are available for use in the home. These acaracides require repeated applications to remain effective and may irritate asthmatics. It is still unclear how much, if any clinical benefit is gained by the use of acaracides. Therefore these agents are usually not recommended for initial mite control.        *All information has been reviewed, updated and approved by:  Dr. James Cuellar[facility] updated: 11/2016

## 2019-07-08 NOTE — NURSING NOTE
Patient had 20 prick tests placed this visit.    Control Tests (2 tests)    Standard panel (18 tests)

## 2019-07-08 NOTE — NURSING NOTE
Allergy Rooming Note    Mike Naidu's goals for this visit include:   Chief Complaint   Patient presents with     Allergies     Gaetano is here for prick testing      Felicia Ceballos LPN

## 2019-07-08 NOTE — PROGRESS NOTES
Formerly [facility] Dermatology Note      Dermatology Problem List:  1. Atopic dermatitis: atopy screen positive to D. Pteronyssinus (house dust mite) - 7/8/2019  -Currently improving. S/p dupixent initiation 10/17/18. Initial concern for drug rash after starting Dupixent, but more consistent with atopic dermatitis flare.    Continue:  - Vanicream over entire body 2-3 times/day  - Dupixent 300mg every other week  - Triamcinolone 0.1% cream BID to eczematous patches 2 times a day for 2 weeks.   - Free and clear shampoos and soaps    2. Seborrheic Dermatitis   - Ketoconazole shampoo     Encounter Date: Jul 8, 2019    CC:  Chief Complaint   Patient presents with     Allergies     Gaetano is here for prick testing        History of Present Illness:  Mr. Mike Naidu is a 69 year old male who presents for follow-up of atopic dermatitis. He was last seen 3/18/2019. Patient has been on Dupixent since October 2018 and has dramatic improvement in his skin. Patient continues to report intermittent pruritus, mostly on his bilateral arms, back, and stomach. He receives dupixent every other week (next dose due 7/10). He applies Vanicream at least daily, sometimes more frequently to affected areas. He only applies triamcinolone 0.1% cream to the affected areas as needed.     Past Medical History:   - No history of eczema in childhood. No history of asthma.     No past surgical history on file.    Social History:  Patient reports that he has never smoked. He has never used smokeless tobacco. 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     Family History:  Atopic dermatitis in 2 of his grandchildren.     Medications:  Current Outpatient Medications   Medication Sig Dispense Refill     Dupilumab (DUPIXENT) 300 MG/2ML syringe Inject 2 mLs (300 mg) Subcutaneous every 14 days for 12 doses 24 mL 0     [START ON 7/9/2019] ketoconazole (NIZORAL) 2 % external shampoo Apply  topically three times a week Wash hair 120 mL 1     atorvastatin (LIPITOR) 80 MG tablet        cetirizine (ZYRTEC) 10 MG tablet        doxepin (SINEQUAN) 50 MG capsule        emollient (VANICREAM) cream Apply topically as needed for other Put on entire skin 2-3 times per day. Very dry skin and might use one jar every 1-2 weeks. 453 g 8     emollient (VANICREAM) external cream Apply topically 3 times daily Apply to entire body three times a day to help maintain skin barrier. 453 g 3     FLOMAX 0.4 MG capsule        folic acid (FOLVITE) 1 MG tablet        gabapentin (NEURONTIN) 300 MG capsule        hydrOXYzine (ATARAX) 25 MG tablet        pantoprazole (PROTONIX) 40 MG EC tablet        predniSONE (DELTASONE) 20 MG tablet Start at 50mg (today and tomorrow morning) and then reduce by 5mg every day ==> therefore after 11 days stop Prednisone  Please provide patient with enough 20mg and 5mg Tabl (Patient not taking: Reported on 3/18/2019.) 22 tablet 0     predniSONE (DELTASONE) 5 MG tablet Take 2 tablets (10 mg) by mouth daily (Patient not taking: Reported on 3/18/2019.) 15 tablet 0     RANITIDINE 150 MAX STRENGTH 150 MG tablet        triamcinolone (KENALOG) 0.1 % cream        Allergies   Allergen Reactions     Procaine Swelling     Sulfa Drugs Swelling and Itching     swelling     Valproic Acid Itching and Rash     hives         Review of Systems:  -Constitutional: Otherwise feeling well today, in usual state of health.  -Skin: As above in HPI. No additional skin concerns.    Physical exam:  Vitals: There were no vitals taken for this visit.  GEN: This is a well developed, well-nourished male in no acute distress, in a pleasant mood.    SKIN: Focused examination of the hands, arms, legs, back, abdomen, and face was performed.  - Scattered areas of excoriations on bilateral arms and legs with with crusted papules.  - Diffusely dry skin   - Striae of skin and atrophy with hypopigmented streaks on bilateral arms   - Overall  improvement in dermatitis       Previous Patch Testing for Reference:    Order for PATCH TESTS    [] Outpatient  [] Inpatient: [facility]..../ Bed ....      Skin Atopy (atopic dermatitis) [x] Yes   [] No  Rhinitis/Sinusitis:   [x] Yes   [] No  Allergic Asthma:   [] Yes   [x] No  Food Allergy:   [] Yes   [x] No  Leg ulcers:   [] Yes   [x] No  Hand eczema:   [x] Yes   [] No   Leading hand:   [x] R   [] L       [] Ambidextrous                        Reason for tests (suspected allergy): generalized subacute to chronic eczema with spongiosis and peripheral and skin Eosinophilia  Known previous allergies: Sulphonamides, Valproic acid and Procaine    Standardized panels  [x] Standard panel (40 tests)  [x] Preservatives & Antimicrobials (31 tests)  [x] Emulsifiers & Additives (25 tests)   [] Perfumes/Flavours & Plants (25 tests)  [] Hairdresser panel (12 tests)  [] Rubber Chemicals (22 tests)  [] Plastics (26 tests)  [] Colorants/Dyes/Food additives (20 tests)  [] Metals (implants/dental) (23 tests)  [] Local anaesthetics/NSAIDs (12 tests)  [] Antibiotics & Antimycotics (14 tests)   [x] Corticosteroids (15 tests)   [] Photopatch test (32 tests)   [] others: ...      [] Patient's own products: ...    DO NOT test if chemical or biological identity is unknown!     always ask from patient the product information and safety sheets (MSDS)     [] Patient needs consultation with Allergy team (changes of tests may apply)  [x] Tests discussed with Allergy team (can have direct appointment for patch tests)    RESULTS & EVALUATION of PATCH TESTS    Date/time of application:  Physician/Nurse:  / Felicia Ceballos LPN               Localization of application: Back --> on lower back chronic eczema, but upper back can be used for patch tests. Rather dry there, but no acute eczema    Patch test readings after     [x] 2 days, [] 3 days [x] 4 days, [] 5 days,   Other duration: ...    Applied patch tests with results (import here the  list of patch tests):  Date/time of application:10/01/18   Physician/Nurse:  / Felicia Ceballos LPN               Localization of application: Back     STANDARD Series         No Substance 2 days 4 days remarks   1 Jhony Mix [C] - -    2 Colophony - -    3  2-Mercaptobenzothiazole  - -     4 Methylisothiazolinone - -    5 Carba Mix - -    6 Thiurma Mix [A] - -    7 Bisphenol A Epoxy Resin - -    8 A-Bayy-Zgjrzojnkhk-Formaldehyde Resin - -    9 Mercapto Mix [A] - -    10 Black Rubber Mix- PPD [B] - -    11 Potassium Dichromate  + +/++ 5mm erosion   12 Balsam of [place] (Myroxylon Pereirae Resin) - -    13 Nickel Sulphate Hexahydrate +/++ ++ 14mm erosion   14 Mixed Dialkyl Thiourea - -    15 Paraben Mix [B] (+) + 4mm erosion   16 Methyldibromo Glutaronitrile - + 4mm erosion   17 Fragrance Mix - -    18 2-Bromo-2-Nitropropane-1,3-Diol (Bronopol) - -    19 Lyral - -    20 Tixocortol-21- Pivalate - -    21 Diazolidiyl Urea (Germall II) - -    22 Methyl Methacrylate - -    23 Cobalt (II) Chloride Hexahydrate + -    24 Fragrance Mix II  - -    25 Compositae Mix - -    26 Benzoyl Peroxide - -    27 Bacitracin - -    28 Formaldehyde - -    29 Methylchloroisothiazolinone / Methylisothiazolinone - -    30 Corticosteroid Mix - -    31 Sodium Lauryl Sulfate - -    32 Lanolin Alcohol - -    33 Turpentine - -    34 Cetylstearylalcohol - -    35 Chlorhexidine Dicluconate - -    36 Budenoside - -    37 Imidazolidinyl Urea  - -    38 Ethyl-2 Cyanoacrylate - -    39 Quaternium 15 (Dowicil 200) - -    40 Decyl Glucoside - -      PRESERVATIVES & ANTIMICROBIALS         No Substance 2 days 4 days remarks   41  1,2-Benzisothiazoline-3-One, Sodium Salt - -    42  1,3,5-Antwan (2-Hydroxyethyl) - Hexahydrotriazine (Grotan BK) - -    43 7-Jmekielxyfisg-7-Nitro-1, 3-Propanediol - -    44  3, 4, 4' - Triclocarban - -    45 4 - Chloro - 3 - Cresol - -    46 4 - Chloro - 4 - Xylenol (PCMX) - -    47 7-Ethylbicyclooxazolidine ([facility] [id number])  - -    48 Benzalkonium Chloride - -    49 Benzyl Alcohol - -    50 Cetalkonium Chloride - -    51 Cetylpyrimidine Chloride  - -    NA Chloroacetamide NA NA    52 DMDM Hydantoin - -    53 Glutaraldehyde - -    54 Triclosan - -    55 Glyoxal Trimeric Dihydrate (+) + 7mm Erosion   56 Iodopropynyl Butylcarbamate - -    57 Octylisothiazoline - -    58 Iodoform - -    59 (Nitrobutyl) Morpholine/(Ethylnitro-Trimethylene) Dimorpholine (Bioban P 1487) - -    60 Phenoxyethanol - -    61 Phenyl Salicylate - -    62 Povidone Iodine - -    63 Sodium Benzoate - -    64 Sodium Disulfite - -    65 Sorbic Acid - -    66 Thimerosal - -     Parabens      67 Butyl-P-Hydroxybenzoate - -    68 Ethyl-P-Hydroxybenzoate - -    69 Methyl-P-Hydroxybenzoate - -    70 Propyl-P-Hydroxybenzoate - -      EMULSIFIERS & ADDITIVES        No Substance 2 days 4 days remarks   71 Polyethylene Glycol-400 (+) -    72 Cocamidopropyl Betaine - -    73 Amerchol L101 - -    74 Propylene Glycol - -    75 Triethanolamine - -    76 Sorbitane Sesquiolate - -    77 Isopropylmyristate (+) -    78 Polysorbate 80  - -    79 Amidoamine   (Stearamidopropyl Dimethylamine) - -    80 Oleamidopropyl Dimethylamine - -    81 Lauryl Glucoside - -    82 Coconut Diethanolamide  - -    83 2-Hydroxy-4-Methoxy Benzophenone (Oxybenzone) - -    84 Benzophenone-4 (Sulisobenzon) - -    85 Propolis - -    86 Dexpanthenol - -    87 Carboxymethyl Cellulose Sodium - -    88 Abitol - -    89 Tert-Butylhydroquinone - -    90 Benzyl Salicylate - -     Antioxidant      91 Dodecyl Gallate - -    92 Butylhydroxyanisole (BHA) - -    93 Butylhydroxytoluene (BHT) - -    94 Di-Alpha-Tocopherol (Vit E) - -    95 Propyl Gallate - -      CORTICOSTEROIDS    No Substance 2 days 4 days remarks Allergy  class   96 Amcinonide - -  B   97 Betametasone-17,21 Dipropionate - -  D1   98 Desoximetasone - -  C   99 Betamethasone-17-Valerate - -  D1   100 Dexamethasone - -  C   101 Hydrocortisone - -  A   102  Clobetasol-17-Propionate - -  D1   103 Dexamethasone-21-Phosphate Disodium Salt - -  C   104 Hydrocortisone-17 Butyrate - -  D2   105 Prednisolone - -  A   106 Mometason Furoate - -  D1   107 Triamcinolone Acetonide - -  B   108 Methylprednisolone Aceponate - -  D2   109 Hydrocortisone-21-Acetate - -  A   110 Prednicarbate - -  D2       Group Characteristics of group Generic name Name  cross reactions   A Hydrocortisone   Cloprednole, Fludrocortisone acétate, Hydrocortison acetate, Methylprednisolone, Prednisolone, Tixocortolpivalate Alfacortone, Fucidin H, Dermacalm, Hexacortone, Premandole, Imacort With group D2   B Triamcinolone-acetonide   Budenoside (R-isomer), Amcinonide, Desonide, Fluocinolone acetonide, Triamcinolone acetonide Locapred, Locatop  Synalar, Pevisone, Ken[place] -   C Betamethasone (Without Romina)   Betamethasone, Dexamethasone, Flumethasone pivalate, Halomethasone Daivobet, Dexasalyl, Locasalen,   -   D1 Betamethasone-diproprionate   Betamethasone dipropionate, Betamethasone-17-valerate, Clobetasole-propionate, Fluticasone propionate, Mometasone furoate Betnovate, Diprogenta, Diprosalic, Diprosone, Celestoderm, Fucicort,  Cutivate, Axotide, Elocom -   D2 Methylprednisolone-aceponate   Hydrocortisone-aceponate, Hydrocortisone-buteprate, Hydrocortisone-17-butyrate, Methylprednisolone aceponate, Prednicarbate Locoïd, Advantan,  Prednitop With group A and Budesonide (S-isomer)     Results of patch tests:                         Interpretation:    - Negative                    A    = Allergic      (+) Erythema    TI   = Toxic/irritant   + E + Infiltration    RaP = Relevance at Present     ++ E/I + Papulovesicle   Rpr  = Relevance Previously     +++ E/I/P + Blister     nR   = No Relevance    Atopy Screen (07/08/2019)    No Substance Readings (15min) Evaluation   POS Histamine 1mg/ml ++    NEG NaCl 0.9% -      No Substance Readings (15min) Evaluation   1 Alternaria alternata (tenuis)  -    2  Cladosporium herbarum -    3 Aspergillus fumigatus -    4 Penicillium notatum -    5 Dermatophagoides pteronyssinus +++    6 Dermatophagoides farinae -    7 Dog epithelium (canis spp) -    8 Cat hair (steve catus) -    9 Cockroach   (Blatella americana & germanica) -    10 Grass mix midwest   (Noris, Orchard, Redtop, Brayden) -    11  grass (sorghum halepense) -    12 Weed mix   (common Cocklebur, Lamb s quarters, rough redroot Pigweed, Dock/Sorrel) -    13 Mugwort (artemisia vulgare) -    14 Ragweed giant/short (ambrosia spp) -    15 English Plantain (plantago lanceolata) -    16 Tree mix 1 (Pecan, Maple BHR, Oak RVW, american Garwood, black [place]) NA    17 Red cedar (juniperus virginia) -    18 Tree mix 2   (white Dirk, river/red Birch, black Sargeant, common [place], american Elm) NA    19 Box elder/Maple mix (acer spp) -    20 Fentress shagbark (carya ovata) -       -    Conclusions: Patient is atopic with clear immediate type reaction to D. Pteronyssinus (house dust mite). Patient should observe the molds and house dust mite areas and report back with photos if any delayed reaction there       [x] Allergic reaction diagnosed against: against metals (Cobalt, Nickel and chromate) and as well angry back --> could go well with atopic dermatitis   ==> the reactions are all erosions and not typical allergic, infiltrated lesions.     Interpretation/ remarks:   First of all, after removal patient has angry back (see photo). Based on that the results are difficult to obtain. However, all the reactions were well defined erosions without infiltration and therefore rather irritant reactions with underlying atopic dermatitis than real allergic reactions.  The only real reaction seems to be to the metals, particularly Nickel, which would go well with atopic dermatitis    Final Diagnosis:  - Generalized, subacute to chronic eczema with spongiosis and peripheral and blood Eosinophilia ==> probably atopic  dermatitis   --> some indications for allergic contact dermatitis to nickel and several, mostly irritant reactions to Parabens,  Methyldibromo Gluturonitrile and  Glyoxal Trimeric Dihydrate   DDx  drug allergy (Atorvastatin, Pantoprazole), cutaneous T cell Lymphoma (bone marrow in [place] normal)    Total IgE, CBC, spec IgE D. Pteronyssinus --> done [facility]?    --> Plan in future prick tests with HDM and moulds       Impression/Plan:    1. Severe exantematic, generalized Atopic Dermatitis: Atopy skin prick test on 7/8/2019 with clear immediate type reaction to D. Pteronyssinus (house dust mite).     Has been on Dupixent since 10/17/18. Initially 600mg subcutaneously, currently 300mg every other week. Initially some concern for drug reaction, but allergic patch testing showed back erosion consistent with atopic dermatitis with no specific allergy. Re-prescribed Dupixent for 6 months.    Could consider patch test in the future given previous patch test (10/2018) was completed during an acute eczematous phase, so results were not clear on parabens and Methyldibromo Glutaronitrile.      [facility] will check CBC, kidney, liver, and inflammatory panels every couple months, as scheduled.     Continue triamcinolone 0.1% cream to hands and arms as needed.    Continue Vanicream to entire body 2-3 times/day    Use gentle free and clear soaps and shampoos    Suspicious for allergy of metals in the coke can, or additives in the coke. Advised patient to avoid drinking pop out of cans.   --> atopic predisposition with clinically relevant sensitization to house dust mites (D. Pteronyssinus) with Rhinitis entire day = given infos for house dust mite reduction    2. Seborrheic Dermatitis   - Start ketoconazole 2% shampoo - apply three times a week     Follow-up in 5 months, earlier for new or changing lesions.

## 2019-07-08 NOTE — LETTER
7/8/2019       RE: Mike Naidu  [id number] [facility] [id number]     Dear Colleague,    Thank you for referring your patient, Mike Naidu, to the [facility] DERMATOLOGY at [facility]. Please see a copy of my visit note below.    [facility] Dermatology Note      Dermatology Problem List:  1. Atopic dermatitis: atopy screen positive to D. Pteronyssinus (house dust mite) - 7/8/2019  -Currently improving. S/p dupixent initiation 10/17/18. Initial concern for drug rash after starting Dupixent, but more consistent with atopic dermatitis flare.    Continue:  - Vanicream over entire body 2-3 times/day  - Dupixent 300mg every other week  - Triamcinolone 0.1% cream BID to eczematous patches 2 times a day for 2 weeks.   - Free and clear shampoos and soaps    2. Seborrheic Dermatitis   - Ketoconazole shampoo     Encounter Date: Jul 8, 2019    CC:  Chief Complaint   Patient presents with     Allergies     Gaetano is here for prick testing        History of Present Illness:  Mr. Mike Naidu is a 69 year old male who presents for follow-up of atopic dermatitis. He was last seen 3/18/2019. Patient has been on Dupixent since October 2018 and has dramatic improvement in his skin. Patient continues to report intermittent pruritus, mostly on his bilateral arms, back, and stomach. He receives dupixent every other week (next dose due 7/10). He applies Vanicream at least daily, sometimes more frequently to affected areas. He only applies triamcinolone 0.1% cream to the affected areas as needed.     Past Medical History:   - No history of eczema in childhood. No history of asthma.     No past surgical history on file.    Social History:  Patient reports that he has never smoked. He has never used smokeless tobacco. 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     Family History:  Atopic dermatitis in 2 of his  grandchildren.     Medications:  Current Outpatient Medications   Medication Sig Dispense Refill     Dupilumab (DUPIXENT) 300 MG/2ML syringe Inject 2 mLs (300 mg) Subcutaneous every 14 days for 12 doses 24 mL 0     [START ON 7/9/2019] ketoconazole (NIZORAL) 2 % external shampoo Apply topically three times a week Wash hair 120 mL 1     atorvastatin (LIPITOR) 80 MG tablet        cetirizine (ZYRTEC) 10 MG tablet        doxepin (SINEQUAN) 50 MG capsule        emollient (VANICREAM) cream Apply topically as needed for other Put on entire skin 2-3 times per day. Very dry skin and might use one jar every 1-2 weeks. 453 g 8     emollient (VANICREAM) external cream Apply topically 3 times daily Apply to entire body three times a day to help maintain skin barrier. 453 g 3     FLOMAX 0.4 MG capsule        folic acid (FOLVITE) 1 MG tablet        gabapentin (NEURONTIN) 300 MG capsule        hydrOXYzine (ATARAX) 25 MG tablet        pantoprazole (PROTONIX) 40 MG EC tablet        predniSONE (DELTASONE) 20 MG tablet Start at 50mg (today and tomorrow morning) and then reduce by 5mg every day ==> therefore after 11 days stop Prednisone  Please provide patient with enough 20mg and 5mg Tabl (Patient not taking: Reported on 3/18/2019.) 22 tablet 0     predniSONE (DELTASONE) 5 MG tablet Take 2 tablets (10 mg) by mouth daily (Patient not taking: Reported on 3/18/2019.) 15 tablet 0     RANITIDINE 150 MAX STRENGTH 150 MG tablet        triamcinolone (KENALOG) 0.1 % cream        Allergies   Allergen Reactions     Procaine Swelling     Sulfa Drugs Swelling and Itching     swelling     Valproic Acid Itching and Rash     hives         Review of Systems:  -Constitutional: Otherwise feeling well today, in usual state of health.  -Skin: As above in HPI. No additional skin concerns.    Physical exam:  Vitals: There were no vitals taken for this visit.  GEN: This is a well developed, well-nourished male in no acute distress, in a pleasant mood.     SKIN: Focused examination of the hands, arms, legs, back, abdomen, and face was performed.  - Scattered areas of excoriations on bilateral arms and legs with with crusted papules.  - Diffusely dry skin   - Striae of skin and atrophy with hypopigmented streaks on bilateral arms   - Overall improvement in dermatitis       Previous Patch Testing for Reference:    Order for PATCH TESTS    [] Outpatient  [] Inpatient: [facility]..../ Bed ....      Skin Atopy (atopic dermatitis) [x] Yes   [] No  Rhinitis/Sinusitis:   [x] Yes   [] No  Allergic Asthma:   [] Yes   [x] No  Food Allergy:   [] Yes   [x] No  Leg ulcers:   [] Yes   [x] No  Hand eczema:   [x] Yes   [] No   Leading hand:   [x] R   [] L       [] Ambidextrous                        Reason for tests (suspected allergy): generalized subacute to chronic eczema with spongiosis and peripheral and skin Eosinophilia  Known previous allergies: Sulphonamides, Valproic acid and Procaine    Standardized panels  [x] Standard panel (40 tests)  [x] Preservatives & Antimicrobials (31 tests)  [x] Emulsifiers & Additives (25 tests)   [] Perfumes/Flavours & Plants (25 tests)  [] Hairdresser panel (12 tests)  [] Rubber Chemicals (22 tests)  [] Plastics (26 tests)  [] Colorants/Dyes/Food additives (20 tests)  [] Metals (implants/dental) (23 tests)  [] Local anaesthetics/NSAIDs (12 tests)  [] Antibiotics & Antimycotics (14 tests)   [x] Corticosteroids (15 tests)   [] Photopatch test (32 tests)   [] others: ...      [] Patient's own products: ...    DO NOT test if chemical or biological identity is unknown!     always ask from patient the product information and safety sheets (MSDS)     [] Patient needs consultation with Allergy team (changes of tests may apply)  [x] Tests discussed with Allergy team (can have direct appointment for patch tests)    RESULTS & EVALUATION of PATCH TESTS    Date/time of application:  Physician/Nurse:  / Felicia Ceballos LPN               Localization  of application: Back --> on lower back chronic eczema, but upper back can be used for patch tests. Rather dry there, but no acute eczema    Patch test readings after     [x] 2 days, [] 3 days [x] 4 days, [] 5 days,   Other duration: ...    Applied patch tests with results (import here the list of patch tests):  Date/time of application:10/01/18   Physician/Nurse:  / Felicia Ceballos LPN               Localization of application: Back     STANDARD Series         No Substance 2 days 4 days remarks   1 Jhony Mix [C] - -    2 Colophony - -    3  2-Mercaptobenzothiazole  - -     4 Methylisothiazolinone - -    5 Carba Mix - -    6 Thiurma Mix [A] - -    7 Bisphenol A Epoxy Resin - -    8 C-Rasi-Yzofsqnjdii-Formaldehyde Resin - -    9 Mercapto Mix [A] - -    10 Black Rubber Mix- PPD [B] - -    11 Potassium Dichromate  + +/++ 5mm erosion   12 Balsam of [place] (Myroxylon Pereirae Resin) - -    13 Nickel Sulphate Hexahydrate +/++ ++ 14mm erosion   14 Mixed Dialkyl Thiourea - -    15 Paraben Mix [B] (+) + 4mm erosion   16 Methyldibromo Glutaronitrile - + 4mm erosion   17 Fragrance Mix - -    18 2-Bromo-2-Nitropropane-1,3-Diol (Bronopol) - -    19 Lyral - -    20 Tixocortol-21- Pivalate - -    21 Diazolidiyl Urea (Germall II) - -    22 Methyl Methacrylate - -    23 Cobalt (II) Chloride Hexahydrate + -    24 Fragrance Mix II  - -    25 Compositae Mix - -    26 Benzoyl Peroxide - -    27 Bacitracin - -    28 Formaldehyde - -    29 Methylchloroisothiazolinone / Methylisothiazolinone - -    30 Corticosteroid Mix - -    31 Sodium Lauryl Sulfate - -    32 Lanolin Alcohol - -    33 Turpentine - -    34 Cetylstearylalcohol - -    35 Chlorhexidine Dicluconate - -    36 Budenoside - -    37 Imidazolidinyl Urea  - -    38 Ethyl-2 Cyanoacrylate - -    39 Quaternium 15 (Dowicil 200) - -    40 Decyl Glucoside - -      PRESERVATIVES & ANTIMICROBIALS         No Substance 2 days 4 days remarks   41  1,2-Benzisothiazoline-3-One, Sodium  Salt - -    42  1,3,5-Antwan (2-Hydroxyethyl) - Hexahydrotriazine (Grotan BK) - -    43 4-Qraticnukubjs-2-Nitro-1, 3-Propanediol - -    44  3, 4, 4' - Triclocarban - -    45 4 - Chloro - 3 - Cresol - -    46 4 - Chloro - 4 - Xylenol (PCMX) - -    47 7-Ethylbicyclooxazolidine (Bioban SO7107) - -    48 Benzalkonium Chloride - -    49 Benzyl Alcohol - -    50 Cetalkonium Chloride - -    51 Cetylpyrimidine Chloride  - -    NA Chloroacetamide NA NA    52 DMDM Hydantoin - -    53 Glutaraldehyde - -    54 Triclosan - -    55 Glyoxal Trimeric Dihydrate (+) + 7mm Erosion   56 Iodopropynyl Butylcarbamate - -    57 Octylisothiazoline - -    58 Iodoform - -    59 (Nitrobutyl) Morpholine/(Ethylnitro-Trimethylene) Dimorpholine (Bioban P 1487) - -    60 Phenoxyethanol - -    61 Phenyl Salicylate - -    62 Povidone Iodine - -    63 Sodium Benzoate - -    64 Sodium Disulfite - -    65 Sorbic Acid - -    66 Thimerosal - -     Parabens      67 Butyl-P-Hydroxybenzoate - -    68 Ethyl-P-Hydroxybenzoate - -    69 Methyl-P-Hydroxybenzoate - -    70 Propyl-P-Hydroxybenzoate - -      EMULSIFIERS & ADDITIVES        No Substance 2 days 4 days remarks   71 Polyethylene Glycol-400 (+) -    72 Cocamidopropyl Betaine - -    73 Amerchol L101 - -    74 Propylene Glycol - -    75 Triethanolamine - -    76 Sorbitane Sesquiolate - -    77 Isopropylmyristate (+) -    78 Polysorbate 80  - -    79 Amidoamine   (Stearamidopropyl Dimethylamine) - -    80 Oleamidopropyl Dimethylamine - -    81 Lauryl Glucoside - -    82 Coconut Diethanolamide  - -    83 2-Hydroxy-4-Methoxy Benzophenone (Oxybenzone) - -    84 Benzophenone-4 (Sulisobenzon) - -    85 Propolis - -    86 Dexpanthenol - -    87 Carboxymethyl Cellulose Sodium - -    88 Abitol - -    89 Tert-Butylhydroquinone - -    90 Benzyl Salicylate - -     Antioxidant      91 Dodecyl Gallate - -    92 Butylhydroxyanisole (BHA) - -    93 Butylhydroxytoluene (BHT) - -    94 Di-Alpha-Tocopherol (Vit E) - -    95  Propyl Gallate - -      CORTICOSTEROIDS    No Substance 2 days 4 days remarks Allergy  class   96 Amcinonide - -  B   97 Betametasone-17,21 Dipropionate - -  D1   98 Desoximetasone - -  C   99 Betamethasone-17-Valerate - -  D1   100 Dexamethasone - -  C   101 Hydrocortisone - -  A   102 Clobetasol-17-Propionate - -  D1   103 Dexamethasone-21-Phosphate Disodium Salt - -  C   104 Hydrocortisone-17 Butyrate - -  D2   105 Prednisolone - -  A   106 Mometason Furoate - -  D1   107 Triamcinolone Acetonide - -  B   108 Methylprednisolone Aceponate - -  D2   109 Hydrocortisone-21-Acetate - -  A   110 Prednicarbate - -  D2       Group Characteristics of group Generic name Name  cross reactions   A Hydrocortisone   Cloprednole, Fludrocortisone acétate, Hydrocortison acetate, Methylprednisolone, Prednisolone, Tixocortolpivalate Alfacortone, Fucidin H, Dermacalm, Hexacortone, Premandole, Imacort With group D2   B Triamcinolone-acetonide   Budenoside (R-isomer), Amcinonide, Desonide, Fluocinolone acetonide, Triamcinolone acetonide Locapred, Locatop  Synalar, Pevisone, Ken[place] -   C Betamethasone (Without Romina)   Betamethasone, Dexamethasone, Flumethasone pivalate, Halomethasone Daivobet, Dexasalyl, Locasalen,   -   D1 Betamethasone-diproprionate   Betamethasone dipropionate, Betamethasone-17-valerate, Clobetasole-propionate, Fluticasone propionate, Mometasone furoate Betnovate, Diprogenta, Diprosalic, Diprosone, Celestoderm, Fucicort,  Cutivate, Axotide, Elocom -   D2 Methylprednisolone-aceponate   Hydrocortisone-aceponate, Hydrocortisone-buteprate, Hydrocortisone-17-butyrate, Methylprednisolone aceponate, Prednicarbate Locoïd, Advantan,  Prednitop With group A and Budesonide (S-isomer)     Results of patch tests:                         Interpretation:    - Negative                    A    = Allergic      (+) Erythema    TI   = Toxic/irritant   + E + Infiltration    RaP = Relevance at Present     ++ E/I + Papulovesicle   Rpr   = Relevance Previously     +++ E/I/P + Blister     nR   = No Relevance    Atopy Screen (07/08/2019)    No Substance Readings (15min) Evaluation   POS Histamine 1mg/ml ++    NEG NaCl 0.9% -      No Substance Readings (15min) Evaluation   1 Alternaria alternata (tenuis)  -    2 Cladosporium herbarum -    3 Aspergillus fumigatus -    4 Penicillium notatum -    5 Dermatophagoides pteronyssinus +++    6 Dermatophagoides farinae -    7 Dog epithelium (canis spp) -    8 Cat hair (steve catus) -    9 Cockroach   (Blatella americana & germanica) -    10 Grass mix midwest   (Noris, Orchard, Red[place], Brayden) -    11  grass (sorghum halepense) -    12 Weed mix   (common Cocklebur, Lamb s quarters, rough redroot Pigweed, Dock/Sorrel) -    13 Mugwort (artemisia vulgare) -    14 Ragweed giant/short (ambrosia spp) -    15 English Plantain (plantago lanceolata) -    16 Tree mix 1 (Pecan, Maple BHR, Oak RVW, american [place], black [place]) NA    17 Red cedar (juniperus virginia) -    18 Tree mix 2   (white Dirk, river/red Birch, black [place], common Penelope, american Elm) NA    19 Box elder/Maple mix (acer spp) -    20 Kinney shagbark (carya ovata) -       -    Conclusions: Patient is atopic with clear immediate type reaction to D. Pteronyssinus (house dust mite). Patient should observe the molds and house dust mite areas and report back with photos if any delayed reaction there       [x] Allergic reaction diagnosed against: against metals (Cobalt, Nickel and chromate) and as well angry back --> could go well with atopic dermatitis   ==> the reactions are all erosions and not typical allergic, infiltrated lesions.     Interpretation/ remarks:   First of all, after removal patient has angry back (see photo). Based on that the results are difficult to obtain. However, all the reactions were well defined erosions without infiltration and therefore rather irritant reactions with underlying atopic dermatitis than real allergic  reactions.  The only real reaction seems to be to the metals, particularly Nickel, which would go well with atopic dermatitis    Final Diagnosis:  - Generalized, subacute to chronic eczema with spongiosis and peripheral and blood Eosinophilia ==> probably atopic dermatitis   --> some indications for allergic contact dermatitis to nickel and several, mostly irritant reactions to Parabens,  Methyldibromo Gluturonitrile and  Glyoxal Trimeric Dihydrate   DDx  drug allergy (Atorvastatin, Pantoprazole), cutaneous T cell Lymphoma (bone marrow in [place] normal)    Total IgE, CBC, spec IgE D. Pteronyssinus --> done [facility]?    --> Plan in future prick tests with HDM and moulds       Impression/Plan:    1. Severe exantematic, generalized Atopic Dermatitis: Atopy skin prick test on 7/8/2019 with clear immediate type reaction to D. Pteronyssinus (house dust mite).     Has been on Dupixent since 10/17/18. Initially 600mg subcutaneously, currently 300mg every other week. Initially some concern for drug reaction, but allergic patch testing showed back erosion consistent with atopic dermatitis with no specific allergy. Re-prescribed Dupixent for 6 months.    Could consider patch test in the future given previous patch test (10/2018) was completed during an acute eczematous phase, so results were not clear on parabens and Methyldibromo Glutaronitrile.      [facility] will check CBC, kidney, liver, and inflammatory panels every couple months, as scheduled.     Continue triamcinolone 0.1% cream to hands and arms as needed.    Continue Vanicream to entire body 2-3 times/day    Use gentle free and clear soaps and shampoos    Suspicious for allergy of metals in the coke can, or additives in the coke. Advised patient to avoid drinking pop out of cans.   --> atopic predisposition with clinically relevant sensitization to house dust mites (D. Pteronyssinus) with Rhinitis entire day = given infos for house dust mite reduction    2. Seborrheic Dermatitis    - Start ketoconazole 2% shampoo - apply three times a week     Follow-up in 5 months, earlier for new or changing lesions.       Again, thank you for allowing me to participate in the care of your patient.      Sincerely,    Leon Lanier MD

## 2019-10-04 ENCOUNTER — OFFICE VISIT (OUTPATIENT)
Dept: ALLERGY | Facility: CLINIC | Age: 69
End: 2019-10-04
Payer: COMMERCIAL

## 2019-10-04 DIAGNOSIS — L20.81 ATOPIC NEURODERMATITIS: Primary | ICD-10-CM

## 2019-10-04 RX ORDER — PREDNISONE 10 MG/1
TABLET ORAL
Qty: 15 TABLET | Refills: 0 | Status: SHIPPED | OUTPATIENT
Start: 2019-10-04 | End: 2019-10-29

## 2019-10-04 ASSESSMENT — PAIN SCALES - GENERAL: PAINLEVEL: NO PAIN (0)

## 2019-10-04 NOTE — PATIENT INSTRUCTIONS
1. Will start prednisone (steroid) burst with a taper. Take as per directions on the bottle.  2. Use the triamcinalone cream for 4-5 days to decrease redness.  3. Return to clinic in December for intradermal test for reactions to dust mites and molds.  4. Return to clinic sooner if this persists or happens again.

## 2019-10-04 NOTE — PROGRESS NOTES
[facility] Dermatology Note      Dermatology Problem List:  1. Atopic dermatitis: atopy screen positive to D. Pteronyssinus (house dust mite) - 7/8/2019  -Currently improving. S/p dupixent initiation 10/17/18. Initial concern for drug rash after starting Dupixent, but more consistent with atopic dermatitis flare.    Continue:  - Vanicream over entire body 2-3 times/day  - Dupixent 300mg every other week  - Triamcinolone 0.1% cream BID to eczematous patches 2 times a day for 2 weeks.   - Free and clear shampoos and soaps    2. Seborrheic Dermatitis   - Ketoconazole shampoo     Encounter Date: Oct 4, 2019    CC:  No chief complaint on file.      History of Present Illness:  Mr. Mike Naidu is a 69 year old male who presents for follow-up of atopic dermatitis. He was last seen 3/18/2019. Patient has been on Dupixent since October 2018 and has dramatic improvement in his skin. Patient continues to report intermittent pruritus, mostly on his bilateral arms, back, and stomach. He receives dupixent every other week (next dose due 7/10). He applies Vanicream at least daily, sometimes more frequently to affected areas. He only applies triamcinolone 0.1% cream to the affected areas as needed.       Hx since 7/8/19:  Today (10/4/19) the pt comes in today for followup. He reports that he is breaking out. This flare up started 2.5 weeks ago without obvious source. Gaetano thinks this started improving last night. He currently has a widespread red, pruritic rash He continues the Dupixent injection every 2 weeks with his last injection yesterday. He is no longer taking the Zyrtec.      Past Medical History:   - No history of eczema in childhood. No history of asthma.     No past surgical history on file.    Social History:  Patient reports that he has never smoked. He has never used smokeless tobacco. 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     Family  History:  Atopic dermatitis in 2 of his grandchildren.     Medications:  Current Outpatient Medications   Medication Sig Dispense Refill     atorvastatin (LIPITOR) 80 MG tablet        cetirizine (ZYRTEC) 10 MG tablet        doxepin (SINEQUAN) 50 MG capsule        Dupilumab (DUPIXENT) 300 MG/2ML syringe Inject 2 mLs (300 mg) Subcutaneous every 14 days for 12 doses 24 mL 0     emollient (VANICREAM) cream Apply topically as needed for other Put on entire skin 2-3 times per day. Very dry skin and might use one jar every 1-2 weeks. 453 g 8     emollient (VANICREAM) external cream Apply topically 3 times daily Apply to entire body three times a day to help maintain skin barrier. 453 g 3     FLOMAX 0.4 MG capsule        folic acid (FOLVITE) 1 MG tablet        gabapentin (NEURONTIN) 300 MG capsule        hydrOXYzine (ATARAX) 25 MG tablet        ketoconazole (NIZORAL) 2 % external shampoo Apply topically three times a week Wash hair 120 mL 1     pantoprazole (PROTONIX) 40 MG EC tablet        predniSONE (DELTASONE) 20 MG tablet Start at 50mg (today and tomorrow morning) and then reduce by 5mg every day ==> therefore after 11 days stop Prednisone  Please provide patient with enough 20mg and 5mg Tabl (Patient not taking: Reported on 3/18/2019.) 22 tablet 0     predniSONE (DELTASONE) 5 MG tablet Take 2 tablets (10 mg) by mouth daily (Patient not taking: Reported on 3/18/2019.) 15 tablet 0     RANITIDINE 150 MAX STRENGTH 150 MG tablet        triamcinolone (KENALOG) 0.1 % cream        Allergies   Allergen Reactions     Procaine Swelling     Sulfa Drugs Swelling and Itching     swelling     Valproic Acid Itching and Rash     hives         Review of Systems:  -Constitutional: Otherwise feeling well today, in usual state of health.  -Skin: As above in HPI. No additional skin concerns.    Physical exam:  Vitals: There were no vitals taken for this visit.  GEN: This is a well developed, well-nourished male in no acute distress, in a  pleasant mood.    SKIN: Focused examination of the hands, arms, legs, back, abdomen, and face was performed.  - Scattered areas of excoriations on bilateral arms and legs with with crusted papules.  - Diffusely dry skin   - Striae of skin and atrophy with hypopigmented streaks on bilateral arms   - Overall improvement in dermatitis       Previous Patch Testing for Reference:    Order for PATCH TESTS    [] Outpatient  [] Inpatient: [place]..../ Bed ....      Skin Atopy (atopic dermatitis) [x] Yes   [] No  Rhinitis/Sinusitis:   [x] Yes   [] No  Allergic Asthma:   [] Yes   [x] No  Food Allergy:   [] Yes   [x] No  Leg ulcers:   [] Yes   [x] No  Hand eczema:   [x] Yes   [] No   Leading hand:   [x] R   [] L       [] Ambidextrous                        Reason for tests (suspected allergy): generalized subacute to chronic eczema with spongiosis and peripheral and skin Eosinophilia  Known previous allergies: Sulphonamides, Valproic acid and Procaine    Standardized panels  [x] Standard panel (40 tests)  [x] Preservatives & Antimicrobials (31 tests)  [x] Emulsifiers & Additives (25 tests)   [] Perfumes/Flavours & Plants (25 tests)  [] Hairdresser panel (12 tests)  [] Rubber Chemicals (22 tests)  [] Plastics (26 tests)  [] Colorants/Dyes/Food additives (20 tests)  [] Metals (implants/dental) (23 tests)  [] Local anaesthetics/NSAIDs (12 tests)  [] Antibiotics & Antimycotics (14 tests)   [x] Corticosteroids (15 tests)   [] Photopatch test (32 tests)   [] others: ...      [] Patient's own products: ...    DO NOT test if chemical or biological identity is unknown!     always ask from patient the product information and safety sheets (MSDS)     [] Patient needs consultation with Allergy team (changes of tests may apply)  [x] Tests discussed with Allergy team (can have direct appointment for patch tests)    RESULTS & EVALUATION of PATCH TESTS    Date/time of application:  Physician/Nurse:  / Felicia Ceballos LPN                Localization of application: Back --> on lower back chronic eczema, but upper back can be used for patch tests. Rather dry there, but no acute eczema    Patch test readings after     [x] 2 days, [] 3 days [x] 4 days, [] 5 days,   Other duration: ...    Applied patch tests with results (import here the list of patch tests):  Date/time of application:10/01/18   Physician/Nurse:  / Felicia Ceballos LPN               Localization of application: Back     STANDARD Series         No Substance 2 days 4 days remarks   1 Jhony Mix [C] - -    2 Colophony - -    3  2-Mercaptobenzothiazole  - -     4 Methylisothiazolinone - -    5 Carba Mix - -    6 Thiurma Mix [A] - -    7 Bisphenol A Epoxy Resin - -    8 A-Lkil-Nrctlsyuzem-Formaldehyde Resin - -    9 Mercapto Mix [A] - -    10 Black Rubber Mix- PPD [B] - -    11 Potassium Dichromate  + +/++ 5mm erosion   12 Balsam of [place] (Myroxylon Pereirae Resin) - -    13 Nickel Sulphate Hexahydrate +/++ ++ 14mm erosion   14 Mixed Dialkyl Thiourea - -    15 Paraben Mix [B] (+) + 4mm erosion   16 Methyldibromo Glutaronitrile - + 4mm erosion   17 Fragrance Mix - -    18 2-Bromo-2-Nitropropane-1,3-Diol (Bronopol) - -    19 Lyral - -    20 Tixocortol-21- Pivalate - -    21 Diazolidiyl Urea (Germall II) - -    22 Methyl Methacrylate - -    23 Cobalt (II) Chloride Hexahydrate + -    24 Fragrance Mix II  - -    25 Compositae Mix - -    26 Benzoyl Peroxide - -    27 Bacitracin - -    28 Formaldehyde - -    29 Methylchloroisothiazolinone / Methylisothiazolinone - -    30 Corticosteroid Mix - -    31 Sodium Lauryl Sulfate - -    32 Lanolin Alcohol - -    33 Turpentine - -    34 Cetylstearylalcohol - -    35 Chlorhexidine Dicluconate - -    36 Budenoside - -    37 Imidazolidinyl Urea  - -    38 Ethyl-2 Cyanoacrylate - -    39 Quaternium 15 (Dowicil 200) - -    40 Decyl Glucoside - -      PRESERVATIVES & ANTIMICROBIALS         No Substance 2 days 4 days remarks   41   1,2-Benzisothiazoline-3-One, Sodium Salt - -    42  1,3,5-Antwan (2-Hydroxyethyl) - Hexahydrotriazine (Grotan BK) - -    43 6-Zemahdvbkujqb-7-Nitro-1, 3-Propanediol - -    44  3, 4, 4' - Triclocarban - -    45 4 - Chloro - 3 - Cresol - -    46 4 - Chloro - 4 - Xylenol (PCMX) - -    47 7-Ethylbicyclooxazolidine (Bioban [id number]) - -    48 Benzalkonium Chloride - -    49 Benzyl Alcohol - -    50 Cetalkonium Chloride - -    51 Cetylpyrimidine Chloride  - -    NA Chloroacetamide NA NA    52 DMDM Hydantoin - -    53 Glutaraldehyde - -    54 Triclosan - -    55 Glyoxal Trimeric Dihydrate (+) + 7mm Erosion   56 Iodopropynyl Butylcarbamate - -    57 Octylisothiazoline - -    58 Iodoform - -    59 (Nitrobutyl) Morpholine/(Ethylnitro-Trimethylene) Dimorpholine ([email or web address] P 1487) - -    60 Phenoxyethanol - -    61 Phenyl Salicylate - -    62 Povidone Iodine - -    63 Sodium Benzoate - -    64 Sodium Disulfite - -    65 Sorbic Acid - -    66 Thimerosal - -     Parabens      67 Butyl-P-Hydroxybenzoate - -    68 Ethyl-P-Hydroxybenzoate - -    69 Methyl-P-Hydroxybenzoate - -    70 Propyl-P-Hydroxybenzoate - -      EMULSIFIERS & ADDITIVES        No Substance 2 days 4 days remarks   71 Polyethylene Glycol-400 (+) -    72 Cocamidopropyl Betaine - -    73 Amerchol L101 - -    74 Propylene Glycol - -    75 Triethanolamine - -    76 Sorbitane Sesquiolate - -    77 Isopropylmyristate (+) -    78 Polysorbate 80  - -    79 Amidoamine   (Stearamidopropyl Dimethylamine) - -    80 Oleamidopropyl Dimethylamine - -    81 Lauryl Glucoside - -    82 Coconut Diethanolamide  - -    83 2-Hydroxy-4-Methoxy Benzophenone (Oxybenzone) - -    84 Benzophenone-4 (Sulisobenzon) - -    85 Propolis - -    86 Dexpanthenol - -    87 Carboxymethyl Cellulose Sodium - -    88 Abitol - -    89 Tert-Butylhydroquinone - -    90 Benzyl Salicylate - -     Antioxidant      91 Dodecyl Gallate - -    92 Butylhydroxyanisole (BHA) - -    93 Butylhydroxytoluene (BHT) - -    94  Di-Alpha-Tocopherol (Vit E) - -    95 Propyl Gallate - -      CORTICOSTEROIDS    No Substance 2 days 4 days remarks Allergy  class   96 Amcinonide - -  B   97 Betametasone-17,21 Dipropionate - -  D1   98 Desoximetasone - -  C   99 Betamethasone-17-Valerate - -  D1   100 Dexamethasone - -  C   101 Hydrocortisone - -  A   102 Clobetasol-17-Propionate - -  D1   103 Dexamethasone-21-Phosphate Disodium Salt - -  C   104 Hydrocortisone-17 Butyrate - -  D2   105 Prednisolone - -  A   106 Mometason Furoate - -  D1   107 Triamcinolone Acetonide - -  B   108 Methylprednisolone Aceponate - -  D2   109 Hydrocortisone-21-Acetate - -  A   110 Prednicarbate - -  D2       Group Characteristics of group Generic name Name  cross reactions   A Hydrocortisone   Cloprednole, Fludrocortisone acétate, Hydrocortison acetate, Methylprednisolone, Prednisolone, Tixocortolpivalate Alfacortone, Fucidin H, Dermacalm, Hexacortone, Premandole, Imacort With group D2   B Triamcinolone-acetonide   Budenoside (R-isomer), Amcinonide, Desonide, Fluocinolone acetonide, Triamcinolone acetonide Locapred, Locatop  Synalar, Pevisone, [place] -   C Betamethasone (Without Romina)   Betamethasone, Dexamethasone, Flumethasone pivalate, Halomethasone Daivobet, Dexasalyl, Locasalen,   -   D1 Betamethasone-diproprionate   Betamethasone dipropionate, Betamethasone-17-valerate, Clobetasole-propionate, Fluticasone propionate, Mometasone furoate Betnovate, Diprogenta, Diprosalic, Diprosone, Celestoderm, Fucicort,  Cutivate, Axotide, Elocom -   D2 Methylprednisolone-aceponate   Hydrocortisone-aceponate, Hydrocortisone-buteprate, Hydrocortisone-17-butyrate, Methylprednisolone aceponate, Prednicarbate Locoïd, Advantan,  Prednitop With group A and Budesonide (S-isomer)     Results of patch tests:                         Interpretation:    - Negative                    A    = Allergic      (+) Erythema    TI   = Toxic/irritant   + E + Infiltration    RaP = Relevance at  Present     ++ E/I + Papulovesicle   Rpr  = Relevance Previously     +++ E/I/P + Blister     nR   = No Relevance    Atopy Screen (07/08/2019)    No Substance Readings (15min) Evaluation   POS Histamine 1mg/ml ++    NEG NaCl 0.9% -      No Substance Readings (15min) Evaluation   1 Alternaria alternata (tenuis)  -    2 Cladosporium herbarum -    3 Aspergillus fumigatus -    4 Penicillium notatum -    5 Dermatophagoides pteronyssinus +++    6 Dermatophagoides farinae -    7 Dog epithelium (canis spp) -    8 Cat hair (steve catus) -    9 Cockroach   (Blatella americana & germanica) -    10 Grass mix midwest   (Noris, Orchard, Redtop, Brayden) -    11  grass (sorghum halepense) -    12 Weed mix   (common Cocklebur, Lamb s quarters, rough redroot Pigweed, Dock/Sorrel) -    13 Mugwort (artemisia vulgare) -    14 Ragweed giant/short (ambrosia spp) -    15 English Plantain (plantago lanceolata) -    16 Tree mix 1 (Pecan, Maple BHR, Oak RVW, american Geraldine, black Essex) NA    17 Red cedar (juniperus virginia) -    18 Tree mix 2   (white Dirk, river/red Birch, black Flynn, common Uvalda, american Elm) NA    19 Box elder/Maple mix (acer spp) -    20 Alsen shagbark (carya ovata) -       -    Conclusions: Patient is atopic with clear immediate type reaction to D. Pteronyssinus (house dust mite). Patient should observe the molds and house dust mite areas and report back with photos if any delayed reaction there       [x] Allergic reaction diagnosed against: against metals (Cobalt, Nickel and chromate) and as well angry back --> could go well with atopic dermatitis   ==> the reactions are all erosions and not typical allergic, infiltrated lesions.     Interpretation/ remarks:   First of all, after removal patient has angry back (see photo). Based on that the results are difficult to obtain. However, all the reactions were well defined erosions without infiltration and therefore rather irritant reactions with  underlying atopic dermatitis than real allergic reactions.  The only real reaction seems to be to the metals, particularly Nickel, which would go well with atopic dermatitis    Final Diagnosis:  - Generalized, subacute to chronic eczema with spongiosis and peripheral and blood Eosinophilia ==> probably atopic dermatitis   --> some indications for allergic contact dermatitis to nickel and several, mostly irritant reactions to Parabens,  Methyldibromo Gluturonitrile and  Glyoxal Trimeric Dihydrate   DDx  drug allergy (Atorvastatin, Pantoprazole), cutaneous T cell Lymphoma (bone marrow in [place] normal)    Total IgE, CBC, spec IgE D. Pteronyssinus --> done [facility]?    --> Plan in future prick tests with HDM and moulds       Impression/Plan:    1. Severe exantematic, generalized Atopic Dermatitis: Atopy skin prick test on 7/8/2019 with clear immediate type reaction to D. Pteronyssinus (house dust mite).     Has been on Dupixent since 10/17/18. Initially 600mg subcutaneously, currently 300mg every other week. Initially some concern for drug reaction, but allergic patch testing showed back erosion consistent with atopic dermatitis with no specific allergy. Re-prescribed Dupixent for 6 months.    Could consider patch test in the future given previous patch test (10/2018) was completed during an acute eczematous phase, so results were not clear on parabens and Methyldibromo Glutaronitrile.      [facility] will check CBC, kidney, liver, and inflammatory panels every couple months, as scheduled.     Continue triamcinolone 0.1% cream to hands and arms as needed.    Continue Vanicream to entire body 2-3 times/day    Use gentle free and clear soaps and shampoos    Suspicious for allergy of metals in the coke can, or additives in the coke. Advised patient to avoid drinking pop out of cans.   --> atopic predisposition with clinically relevant sensitization to house dust mites (D. Pteronyssinus) with Rhinitis entire day = given infos for house  dust mite reduction    Discussion of prednisone burst as well as increasing Dupixent dosing to weekly injections.   Will do prednisone burst with taper  Use triamcinalone 4-5 days to decrease redness  Return to clinic if this persists or   Return in December for intradermal test for dust mites and molds with photos    2. Seborrheic Dermatitis   - Start ketoconazole 2% shampoo - apply three times a week     Follow-up in 5 months, earlier for new or changing lesions.       Staff Involved:    Patient was seen and staffed by Valente Harry MS4 under the supervision of Dr. Leon Lanier.    Staff Physician Comments:  I was present with the medical student who participated in the service and in the documentation of the note. I have verified the history and personally performed the physical exam and medical decision making. I agree with the assessment and plan as documented in the note. I have reviewed and if necessary amended the note.      Leon Lanier MD  Professor  Head of Dermato-Allergy Division  Department of Dermatology  [facility]

## 2019-10-04 NOTE — LETTER
10/4/2019         RE: Mike Naidu  [id number] [facility] [id number]        Dear Colleague,    Thank you for referring your patient, Mike Naidu, to the [facility]. Please see a copy of my visit note below.    [facility] Dermatology Note      Dermatology Problem List:  1. Atopic dermatitis: atopy screen positive to D. Pteronyssinus (house dust mite) - 7/8/2019  -Currently improving. S/p dupixent initiation 10/17/18. Initial concern for drug rash after starting Dupixent, but more consistent with atopic dermatitis flare.    Continue:  - Vanicream over entire body 2-3 times/day  - Dupixent 300mg every other week  - Triamcinolone 0.1% cream BID to eczematous patches 2 times a day for 2 weeks.   - Free and clear shampoos and soaps    2. Seborrheic Dermatitis   - Ketoconazole shampoo     Encounter Date: Oct 4, 2019    CC:  No chief complaint on file.      History of Present Illness:  Mr. Mike Naidu is a 69 year old male who presents for follow-up of atopic dermatitis. He was last seen 3/18/2019. Patient has been on Dupixent since October 2018 and has dramatic improvement in his skin. Patient continues to report intermittent pruritus, mostly on his bilateral arms, back, and stomach. He receives dupixent every other week (next dose due 7/10). He applies Vanicream at least daily, sometimes more frequently to affected areas. He only applies triamcinolone 0.1% cream to the affected areas as needed.       Hx since 7/8/19:  Today (10/4/19) the pt comes in today for followup. He reports that he is breaking out. This flare up started 2.5 weeks ago without obvious source. Gaetano thinks this started improving last night. He currently has a widespread red, pruritic rash He continues the Dupixent injection every 2 weeks with his last injection yesterday. He is no longer taking the Zyrtec.      Past Medical History:   - No history of eczema in childhood. No history of  asthma.     No past surgical history on file.    Social History:  Patient reports that he has never smoked. He has never used smokeless tobacco. 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     Family History:  Atopic dermatitis in 2 of his grandchildren.     Medications:  Current Outpatient Medications   Medication Sig Dispense Refill     atorvastatin (LIPITOR) 80 MG tablet        cetirizine (ZYRTEC) 10 MG tablet        doxepin (SINEQUAN) 50 MG capsule        Dupilumab (DUPIXENT) 300 MG/2ML syringe Inject 2 mLs (300 mg) Subcutaneous every 14 days for 12 doses 24 mL 0     emollient (VANICREAM) cream Apply topically as needed for other Put on entire skin 2-3 times per day. Very dry skin and might use one jar every 1-2 weeks. 453 g 8     emollient (VANICREAM) external cream Apply topically 3 times daily Apply to entire body three times a day to help maintain skin barrier. 453 g 3     FLOMAX 0.4 MG capsule        folic acid (FOLVITE) 1 MG tablet        gabapentin (NEURONTIN) 300 MG capsule        hydrOXYzine (ATARAX) 25 MG tablet        ketoconazole (NIZORAL) 2 % external shampoo Apply topically three times a week Wash hair 120 mL 1     pantoprazole (PROTONIX) 40 MG EC tablet        predniSONE (DELTASONE) 20 MG tablet Start at 50mg (today and tomorrow morning) and then reduce by 5mg every day ==> therefore after 11 days stop Prednisone  Please provide patient with enough 20mg and 5mg Tabl (Patient not taking: Reported on 3/18/2019.) 22 tablet 0     predniSONE (DELTASONE) 5 MG tablet Take 2 tablets (10 mg) by mouth daily (Patient not taking: Reported on 3/18/2019.) 15 tablet 0     RANITIDINE 150 MAX STRENGTH 150 MG tablet        triamcinolone (KENALOG) 0.1 % cream        Allergies   Allergen Reactions     Procaine Swelling     Sulfa Drugs Swelling and Itching     swelling     Valproic Acid Itching and Rash     hives         Review of Systems:  -Constitutional: Otherwise  feeling well today, in usual state of health.  -Skin: As above in HPI. No additional skin concerns.    Physical exam:  Vitals: There were no vitals taken for this visit.  GEN: This is a well developed, well-nourished male in no acute distress, in a pleasant mood.    SKIN: Focused examination of the hands, arms, legs, back, abdomen, and face was performed.  - Scattered areas of excoriations on bilateral arms and legs with with crusted papules.  - Diffusely dry skin   - Striae of skin and atrophy with hypopigmented streaks on bilateral arms   - Overall improvement in dermatitis       Previous Patch Testing for Reference:    Order for PATCH TESTS    [] Outpatient  [] Inpatient: [facility]..../ Bed ....      Skin Atopy (atopic dermatitis) [x] Yes   [] No  Rhinitis/Sinusitis:   [x] Yes   [] No  Allergic Asthma:   [] Yes   [x] No  Food Allergy:   [] Yes   [x] No  Leg ulcers:   [] Yes   [x] No  Hand eczema:   [x] Yes   [] No   Leading hand:   [x] R   [] L       [] Ambidextrous                        Reason for tests (suspected allergy): generalized subacute to chronic eczema with spongiosis and peripheral and skin Eosinophilia  Known previous allergies: Sulphonamides, Valproic acid and Procaine    Standardized panels  [x] Standard panel (40 tests)  [x] Preservatives & Antimicrobials (31 tests)  [x] Emulsifiers & Additives (25 tests)   [] Perfumes/Flavours & Plants (25 tests)  [] Hairdresser panel (12 tests)  [] Rubber Chemicals (22 tests)  [] Plastics (26 tests)  [] Colorants/Dyes/Food additives (20 tests)  [] Metals (implants/dental) (23 tests)  [] Local anaesthetics/NSAIDs (12 tests)  [] Antibiotics & Antimycotics (14 tests)   [x] Corticosteroids (15 tests)   [] Photopatch test (32 tests)   [] others: ...      [] Patient's own products: ...    DO NOT test if chemical or biological identity is unknown!     always ask from patient the product information and safety sheets (MSDS)     [] Patient needs consultation with Allergy  team (changes of tests may apply)  [x] Tests discussed with Allergy team (can have direct appointment for patch tests)    RESULTS & EVALUATION of PATCH TESTS    Date/time of application:  Physician/Nurse:  / Felicia Ceballos LPN               Localization of application: Back --> on lower back chronic eczema, but upper back can be used for patch tests. Rather dry there, but no acute eczema    Patch test readings after     [x] 2 days, [] 3 days [x] 4 days, [] 5 days,   Other duration: ...    Applied patch tests with results (import here the list of patch tests):  Date/time of application:10/01/18   Physician/Nurse:  / Felicia Ceballos LPN               Localization of application: Back     STANDARD Series         No Substance 2 days 4 days remarks   1 Jhony Mix [C] - -    2 Colophony - -    3  2-Mercaptobenzothiazole  - -     4 Methylisothiazolinone - -    5 Carba Mix - -    6 Thiurma Mix [A] - -    7 Bisphenol A Epoxy Resin - -    8 P-Tbwk-Bogozthhzna-Formaldehyde Resin - -    9 Mercapto Mix [A] - -    10 Black Rubber Mix- PPD [B] - -    11 Potassium Dichromate  + +/++ 5mm erosion   12 Balsam of [place] (Myroxylon Pereirae Resin) - -    13 Nickel Sulphate Hexahydrate +/++ ++ 14mm erosion   14 Mixed Dialkyl Thiourea - -    15 Paraben Mix [B] (+) + 4mm erosion   16 Methyldibromo Glutaronitrile - + 4mm erosion   17 Fragrance Mix - -    18 2-Bromo-2-Nitropropane-1,3-Diol (Bronopol) - -    19 Lyral - -    20 Tixocortol-21- Pivalate - -    21 Diazolidiyl Urea (Germall II) - -    22 Methyl Methacrylate - -    23 Cobalt (II) Chloride Hexahydrate + -    24 Fragrance Mix II  - -    25 Compositae Mix - -    26 Benzoyl Peroxide - -    27 Bacitracin - -    28 Formaldehyde - -    29 Methylchloroisothiazolinone / Methylisothiazolinone - -    30 Corticosteroid Mix - -    31 Sodium Lauryl Sulfate - -    32 Lanolin Alcohol - -    33 Turpentine - -    34 Cetylstearylalcohol - -    35 Chlorhexidine Dicluconate - -     36 Budenoside - -    37 Imidazolidinyl Urea  - -    38 Ethyl-2 Cyanoacrylate - -    39 Quaternium 15 (Dowicil 200) - -    40 Decyl Glucoside - -      PRESERVATIVES & ANTIMICROBIALS         No Substance 2 days 4 days remarks   41  1,2-Benzisothiazoline-3-One, Sodium Salt - -    42  1,3,5-Antwan (2-Hydroxyethyl) - Hexahydrotriazine (Grotan BK) - -    43 7-Rdwtkqsfavmrb-9-Nitro-1, 3-Propanediol - -    44  3, 4, 4' - Triclocarban - -    45 4 - Chloro - 3 - Cresol - -    46 4 - Chloro - 4 - Xylenol (PCMX) - -    47 7-Ethylbicyclooxazolidine (Bioban SR5838) - -    48 Benzalkonium Chloride - -    49 Benzyl Alcohol - -    50 Cetalkonium Chloride - -    51 Cetylpyrimidine Chloride  - -    NA Chloroacetamide NA NA    52 DMDM Hydantoin - -    53 Glutaraldehyde - -    54 Triclosan - -    55 Glyoxal Trimeric Dihydrate (+) + 7mm Erosion   56 Iodopropynyl Butylcarbamate - -    57 Octylisothiazoline - -    58 Iodoform - -    59 (Nitrobutyl) Morpholine/(Ethylnitro-Trimethylene) Dimorpholine (Bioban P 1487) - -    60 Phenoxyethanol - -    61 Phenyl Salicylate - -    62 Povidone Iodine - -    63 Sodium Benzoate - -    64 Sodium Disulfite - -    65 Sorbic Acid - -    66 Thimerosal - -     Parabens      67 Butyl-P-Hydroxybenzoate - -    68 Ethyl-P-Hydroxybenzoate - -    69 Methyl-P-Hydroxybenzoate - -    70 Propyl-P-Hydroxybenzoate - -      EMULSIFIERS & ADDITIVES        No Substance 2 days 4 days remarks   71 Polyethylene Glycol-400 (+) -    72 Cocamidopropyl Betaine - -    73 Amerchol L101 - -    74 Propylene Glycol - -    75 Triethanolamine - -    76 Sorbitane Sesquiolate - -    77 Isopropylmyristate (+) -    78 Polysorbate 80  - -    79 Amidoamine   (Stearamidopropyl Dimethylamine) - -    80 Oleamidopropyl Dimethylamine - -    81 Lauryl Glucoside - -    82 Coconut Diethanolamide  - -    83 2-Hydroxy-4-Methoxy Benzophenone (Oxybenzone) - -    84 Benzophenone-4 (Sulisobenzon) - -    85 Propolis - -    86 Dexpanthenol - -    87  Carboxymethyl Cellulose Sodium - -    88 Abitol - -    89 Tert-Butylhydroquinone - -    90 Benzyl Salicylate - -     Antioxidant      91 Dodecyl Gallate - -    92 Butylhydroxyanisole (BHA) - -    93 Butylhydroxytoluene (BHT) - -    94 Di-Alpha-Tocopherol (Vit E) - -    95 Propyl Gallate - -      CORTICOSTEROIDS    No Substance 2 days 4 days remarks Allergy  class   96 Amcinonide - -  B   97 Betametasone-17,21 Dipropionate - -  D1   98 Desoximetasone - -  C   99 Betamethasone-17-Valerate - -  D1   100 Dexamethasone - -  C   101 Hydrocortisone - -  A   102 Clobetasol-17-Propionate - -  D1   103 Dexamethasone-21-Phosphate Disodium Salt - -  C   104 Hydrocortisone-17 Butyrate - -  D2   105 Prednisolone - -  A   106 Mometason Furoate - -  D1   107 Triamcinolone Acetonide - -  B   108 Methylprednisolone Aceponate - -  D2   109 Hydrocortisone-21-Acetate - -  A   110 Prednicarbate - -  D2       Group Characteristics of group Generic name Name  cross reactions   A Hydrocortisone   Cloprednole, Fludrocortisone acétate, Hydrocortison acetate, Methylprednisolone, Prednisolone, Tixocortolpivalate Alfacortone, Fucidin H, Dermacalm, Hexacortone, Premandole, Imacort With group D2   B Triamcinolone-acetonide   Budenoside (R-isomer), Amcinonide, Desonide, Fluocinolone acetonide, Triamcinolone acetonide Locapred, Locatop  Synalar, Pevisone, [place] -   C Betamethasone (Without Romina)   Betamethasone, Dexamethasone, Flumethasone pivalate, Halomethasone Daivobet, Dexasalyl, Locasalen,   -   D1 Betamethasone-diproprionate   Betamethasone dipropionate, Betamethasone-17-valerate, Clobetasole-propionate, Fluticasone propionate, Mometasone furoate Betnovate, Diprogenta, Diprosalic, Diprosone, Celestoderm, Fucicort,  Cutivate, Axotide, Elocom -   D2 Methylprednisolone-aceponate   Hydrocortisone-aceponate, Hydrocortisone-buteprate, Hydrocortisone-17-butyrate, Methylprednisolone aceponate, Prednicarbate Locoïd, Advantan,  Prednitop With group  A and Budesonide (S-isomer)     Results of patch tests:                         Interpretation:    - Negative                    A    = Allergic      (+) Erythema    TI   = Toxic/irritant   + E + Infiltration    RaP = Relevance at Present     ++ E/I + Papulovesicle   Rpr  = Relevance Previously     +++ E/I/P + Blister     nR   = No Relevance    Atopy Screen (07/08/2019)    No Substance Readings (15min) Evaluation   POS Histamine 1mg/ml ++    NEG NaCl 0.9% -      No Substance Readings (15min) Evaluation   1 Alternaria alternata (tenuis)  -    2 Cladosporium herbarum -    3 Aspergillus fumigatus -    4 Penicillium notatum -    5 Dermatophagoides pteronyssinus +++    6 Dermatophagoides farinae -    7 Dog epithelium (canis spp) -    8 Cat hair (steve catus) -    9 Cockroach   (Blatella americana & germanica) -    10 Grass mix midwest   (Noris, Orchard, Redtop, Brayden) -    11  grass (sorghum halepense) -    12 Weed mix   (common Cocklebur, Lamb s quarters, rough redroot Pigweed, Dock/Sorrel) -    13 Mugwort (artemisia vulgare) -    14 Ragweed giant/short (ambrosia spp) -    15 English Plantain (plantago lanceolata) -    16 Tree mix 1 (Pecan, Maple BHR, Oak RVW, american [place], black [place]) NA    17 Red cedar (juniperus virginia) -    18 Tree mix 2   (white Dirk, river/red Birch, black [place], common Christiana, american Elm) NA    19 Box elder/Maple mix (acer spp) -    20 [place] shagbark (carya ovata) -       -    Conclusions: Patient is atopic with clear immediate type reaction to D. Pteronyssinus (house dust mite). Patient should observe the molds and house dust mite areas and report back with photos if any delayed reaction there       [x] Allergic reaction diagnosed against: against metals (Cobalt, Nickel and chromate) and as well angry back --> could go well with atopic dermatitis   ==> the reactions are all erosions and not typical allergic, infiltrated lesions.     Interpretation/ remarks:   First of  all, after removal patient has angry back (see photo). Based on that the results are difficult to obtain. However, all the reactions were well defined erosions without infiltration and therefore rather irritant reactions with underlying atopic dermatitis than real allergic reactions.  The only real reaction seems to be to the metals, particularly Nickel, which would go well with atopic dermatitis    Final Diagnosis:  - Generalized, subacute to chronic eczema with spongiosis and peripheral and blood Eosinophilia ==> probably atopic dermatitis   --> some indications for allergic contact dermatitis to nickel and several, mostly irritant reactions to Parabens,  Methyldibromo Gluturonitrile and  Glyoxal Trimeric Dihydrate   DDx  drug allergy (Atorvastatin, Pantoprazole), cutaneous T cell Lymphoma (bone marrow in [place] normal)    Total IgE, CBC, spec IgE D. Pteronyssinus --> [facility]?    --> Plan in future prick tests with HDM and moulds       Impression/Plan:    1. Severe exantematic, generalized Atopic Dermatitis: Atopy skin prick test on 7/8/2019 with clear immediate type reaction to D. Pteronyssinus (house dust mite).     Has been on Dupixent since 10/17/18. Initially 600mg subcutaneously, currently 300mg every other week. Initially some concern for drug reaction, but allergic patch testing showed back erosion consistent with atopic dermatitis with no specific allergy. Re-prescribed Dupixent for 6 months.    Could consider patch test in the future given previous patch test (10/2018) was completed during an acute eczematous phase, so results were not clear on parabens and Methyldibromo Glutaronitrile.      [facility], [facility], liver, and inflammatory panels every couple months, as scheduled.     Continue triamcinolone 0.1% cream to hands and arms as needed.    Continue Vanicream to entire body 2-3 times/day    Use gentle free and clear soaps and shampoos    Suspicious for allergy of metals in the coke can, or  additives in the coke. Advised patient to avoid drinking pop out of cans.   --> atopic predisposition with clinically relevant sensitization to house dust mites (D. Pteronyssinus) with Rhinitis entire day = given infos for house dust mite reduction    Discussion of prednisone burst as well as increasing Dupixent dosing to weekly injections.   Will do prednisone burst with taper  Use triamcinalone 4-5 days to decrease redness  Return to clinic if this persists or   Return in December for intradermal test for dust mites and molds with photos    2. Seborrheic Dermatitis   - Start ketoconazole 2% shampoo - apply three times a week     Follow-up in 5 months, earlier for new or changing lesions.       Staff Involved:    Patient was seen and staffed by Valente Harry MS4 under the supervision of Dr. Leon Lanier.    Staff Physician Comments:  I was present with the medical student who participated in the service and in the documentation of the note. I have verified the history and personally performed the physical exam and medical decision making. I agree with the assessment and plan as documented in the note. I have reviewed and if necessary amended the note.      Leon Lanier MD  Professor  Head of Dermato-Allergy Division  Department of Dermatology  [facility] [place], [place]            Again, thank you for allowing me to participate in the care of your patient.        Sincerely,        Leon Lanier MD

## 2019-10-04 NOTE — NURSING NOTE
Allergy Rooming Note    Mike Naidu's goals for this visit include:   Chief Complaint   Patient presents with     Allergies     Mike is here for a flare up. He states it has recently improved.      Felicia Ceballos LPN

## 2019-10-28 ENCOUNTER — TELEPHONE (OUTPATIENT)
Dept: DERMATOLOGY | Facility: CLINIC | Age: 69
End: 2019-10-28

## 2019-10-28 DIAGNOSIS — L20.81 ATOPIC NEURODERMATITIS: ICD-10-CM

## 2019-10-28 NOTE — TELEPHONE ENCOUNTER
[facility] Call Center    Phone Message    May a detailed message be left on voicemail: yes    Reason for Call: Other: Patient stated that he wa never given a paper copy of this perscription and [facility]. Please follow up with the patient asap when this is filled. Thank you.     Action Taken: Message routed to:  [facility] allergy

## 2019-10-29 RX ORDER — PREDNISONE 10 MG/1
TABLET ORAL
Qty: 15 TABLET | Refills: 0 | Status: SHIPPED | OUTPATIENT
Start: 2019-10-29 | End: 2019-11-04

## 2019-11-04 RX ORDER — PREDNISONE 10 MG/1
TABLET ORAL
Qty: 15 TABLET | Refills: 0 | Status: SHIPPED | OUTPATIENT
Start: 2019-11-04 | End: 2019-11-29

## 2019-11-29 ENCOUNTER — OFFICE VISIT (OUTPATIENT)
Dept: ALLERGY | Facility: CLINIC | Age: 69
End: 2019-11-29
Payer: COMMERCIAL

## 2019-11-29 DIAGNOSIS — L20.81 ATOPIC NEURODERMATITIS: ICD-10-CM

## 2019-11-29 DIAGNOSIS — L20.89 OTHER ATOPIC DERMATITIS: ICD-10-CM

## 2019-11-29 RX ORDER — EMOLLIENT BASE
CREAM (GRAM) TOPICAL
Qty: 453 G | Refills: 8 | Status: SHIPPED | OUTPATIENT
Start: 2019-11-29 | End: 2020-02-26

## 2019-11-29 RX ORDER — TRIAMCINOLONE ACETONIDE 1 MG/G
OINTMENT TOPICAL
Qty: 80 G | Refills: 6 | Status: SHIPPED | OUTPATIENT
Start: 2019-11-29 | End: 2020-02-26

## 2019-11-29 RX ORDER — PREDNISONE 10 MG/1
TABLET ORAL
Qty: 15 TABLET | Refills: 0 | Status: SHIPPED | OUTPATIENT
Start: 2019-11-29 | End: 2020-02-26

## 2019-11-29 ASSESSMENT — PAIN SCALES - GENERAL: PAINLEVEL: NO PAIN (0)

## 2019-11-29 NOTE — LETTER
11/29/2019         RE: Mike Naidu  [address] [facility] [address]        Dear Colleague,    Thank you for referring your patient, Mike Naidu, to the [facility] ALLERGY. Please see a copy of my visit note below.    [facility] Allergy Note    Allergy Clinic  [facility]  [address] [place] [id number]    Allergy Problem List:  1. Atopic dermatitis: atopy screen positive to D. Pteronyssinus (house dust mite) - 7/8/2019  -Currently improving. S/p dupixent initiation 10/17/18. Initial concern for drug rash after starting Dupixent, but more consistent with atopic dermatitis flare.    Continue:  - Vanicream over entire body 2-3 times/day  - Dupixent 300mg every other week  - Triamcinolone 0.1% cream BID to eczematous patches 2 times a day for 2 weeks.   - Free and clear shampoos and soaps    2. Seborrheic Dermatitis   - Ketoconazole shampoo     Encounter Date: Nov 29, 2019    CC:  Chief Complaint   Patient presents with     Derm Problem     [facility]  Patient states he has been seeing you for a couple of years. Patient states he did not get his [facility]one due to [facility]. Patient needs a new scropt for Dupixent.        History of Present Illness:  Mr. Mike Naidu is a 69 year old male who presents for follow-up of atopic dermatitis. He was last seen 3/18/2019. Patient has been on Dupixent since October 2018 and has dramatic improvement in his skin. Patient continues to report intermittent pruritus, mostly on his bilateral arms, back, and stomach. He receives dupixent every other week (next dose due 7/10). He applies Vanicream at least daily, sometimes more frequently to affected areas. He only applies triamcinolone 0.1% cream to the affected areas as needed.       Hx since 7/8/19:  Today (10/4/19) the pt comes in today for followup. He reports that he is breaking out. This  flare up started 2.5 weeks ago without obvious source. Gaetano thinks this started improving last night. He currently has a widespread red, pruritic rash He continues the Dupixent injection every 2 weeks with his last injection yesterday. He is no longer taking the Zyrtec.    Hx since 10/4/19:  The patient comes in today (11/29/19) for followup. He reports that he was unable to obtain the medications prescribed at the last visit, notably the prednisone burst.  He states that he has been taking dupixent every 2 weeks and gets flare-ups that are intermittent. States that the symptoms subside after dupixent injection for a time but then he will still get flare-ups.  States that he has not used the triamcinolone cream in the interim due to it being a steroid.   No side effects from the dupixent.  The patient is otherwise feeling well today. There are no other allergy concerns at this time.      Past Medical History:   - No history of eczema in childhood. No history of asthma.     No past surgical history on file.    Social History:  Patient reports that he has never smoked. He has never used smokeless tobacco. 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     Family History:  Atopic dermatitis in 2 of his grandchildren.     Medications:  Current Outpatient Medications   Medication Sig Dispense Refill     atorvastatin (LIPITOR) 80 MG tablet        cetirizine (ZYRTEC) 10 MG tablet        doxepin (SINEQUAN) 50 MG capsule        Dupilumab (DUPIXENT) 300 MG/2ML syringe Inject 2 mLs (300 mg) Subcutaneous every 14 days for 12 doses 24 mL 0     emollient (VANICREAM) cream Apply topically as needed for other Put on entire skin 2-3 times per day. Very dry skin and might use one jar every 1-2 weeks. 453 g 8     emollient (VANICREAM) external cream Apply topically 3 times daily Apply to entire body three times a day to help maintain skin barrier. 453 g 3     FLOMAX 0.4 MG capsule         folic acid (FOLVITE) 1 MG tablet        gabapentin (NEURONTIN) 300 MG capsule        hydrOXYzine (ATARAX) 25 MG tablet        ketoconazole (NIZORAL) 2 % external shampoo Apply topically three times a week Wash hair 120 mL 1     pantoprazole (PROTONIX) 40 MG EC tablet        predniSONE (DELTASONE) 20 MG tablet Start at 50mg (today and tomorrow morning) and then reduce by 5mg every day ==> therefore after 11 days stop Prednisone  Please provide patient with enough 20mg and 5mg Tabl 22 tablet 0     predniSONE (DELTASONE) 5 MG tablet Take 2 tablets (10 mg) by mouth daily 15 tablet 0     RANITIDINE 150 MAX STRENGTH 150 MG tablet        triamcinolone (KENALOG) 0.1 % cream        Allergies   Allergen Reactions     Procaine Swelling     Sulfa Drugs Swelling and Itching     swelling     Valproic Acid Itching and Rash     hives         Review of Systems:  -Constitutional: Otherwise feeling well today, in usual state of health.  -Skin: As above in HPI. No additional skin concerns.    Physical exam:  Vitals: There were no vitals taken for this visit.  GEN: This is a well developed, well-nourished male in no acute distress, in a pleasant mood.    SKIN: Focused examination of the hands, arms, legs, back, abdomen, and face was performed.  - Scattered areas of excoriations on bilateral arms and legs with with crusted papules.  - Diffusely dry skin   - Striae of skin and atrophy with hypopigmented streaks on bilateral arms   - Overall improvement in dermatitis       Previous Patch Testing for Reference:    Order for PATCH TESTS    [] Outpatient  [] Inpatient: [facility]..../ Bed ....      Skin Atopy (atopic dermatitis) [x] Yes   [] No  Rhinitis/Sinusitis:   [x] Yes   [] No  Allergic Asthma:   [] Yes   [x] No  Food Allergy:   [] Yes   [x] No  Leg ulcers:   [] Yes   [x] No  Hand eczema:   [x] Yes   [] No   Leading hand:   [x] R   [] L       [] Ambidextrous                        Reason for tests (suspected allergy): generalized subacute to  chronic eczema with spongiosis and peripheral and skin Eosinophilia  Known previous allergies: Sulphonamides, Valproic acid and Procaine    Standardized panels  [x] Standard panel (40 tests)  [x] Preservatives & Antimicrobials (31 tests)  [x] Emulsifiers & Additives (25 tests)   [] Perfumes/Flavours & Plants (25 tests)  [] Hairdresser panel (12 tests)  [] Rubber Chemicals (22 tests)  [] Plastics (26 tests)  [] Colorants/Dyes/Food additives (20 tests)  [] Metals (implants/dental) (23 tests)  [] Local anaesthetics/NSAIDs (12 tests)  [] Antibiotics & Antimycotics (14 tests)   [x] Corticosteroids (15 tests)   [] Photopatch test (32 tests)   [] others: ...      [] Patient's own products: ...    DO NOT test if chemical or biological identity is unknown!     always ask from patient the product information and safety sheets (MSDS)     [] Patient needs consultation with Allergy team (changes of tests may apply)  [x] Tests discussed with Allergy team (can have direct appointment for patch tests)    RESULTS & EVALUATION of PATCH TESTS    Date/time of application:  Physician/Nurse:  / Felicia Ceballos LPN               Localization of application: Back --> on lower back chronic eczema, but upper back can be used for patch tests. Rather dry there, but no acute eczema    Patch test readings after     [x] 2 days, [] 3 days [x] 4 days, [] 5 days,   Other duration: ...    Applied patch tests with results (import here the list of patch tests):  Date/time of application:10/01/18   Physician/Nurse:  / Felicia Ceballos LPN               Localization of application: Back     STANDARD Series         No Substance 2 days 4 days remarks   1 Jhony Mix [C] - -    2 Colophony - -    3  2-Mercaptobenzothiazole  - -     4 Methylisothiazolinone - -    5 Carba Mix - -    6 Thiurma Mix [A] - -    7 Bisphenol A Epoxy Resin - -    8 T-Lvpf-Syisauwjofi-Formaldehyde Resin - -    9 Mercapto Mix [A] - -    10 Black Rubber Mix- PPD  [B] - -    11 Potassium Dichromate  + +/++ 5mm erosion   12 Balsam of [place] (Myroxylon Pereirae Resin) - -    13 Nickel Sulphate Hexahydrate +/++ ++ 14mm erosion   14 Mixed Dialkyl Thiourea - -    15 Paraben Mix [B] (+) + 4mm erosion   16 Methyldibromo Glutaronitrile - + 4mm erosion   17 Fragrance Mix - -    18 2-Bromo-2-Nitropropane-1,3-Diol (Bronopol) - -    19 Lyral - -    20 Tixocortol-21- Pivalate - -    21 Diazolidiyl Urea (Germall II) - -    22 Methyl Methacrylate - -    23 Cobalt (II) Chloride Hexahydrate + -    24 Fragrance Mix II  - -    25 Compositae Mix - -    26 Benzoyl Peroxide - -    27 Bacitracin - -    28 Formaldehyde - -    29 Methylchloroisothiazolinone / Methylisothiazolinone - -    30 Corticosteroid Mix - -    31 Sodium Lauryl Sulfate - -    32 Lanolin Alcohol - -    33 Turpentine - -    34 Cetylstearylalcohol - -    35 Chlorhexidine Dicluconate - -    36 Budenoside - -    37 Imidazolidinyl Urea  - -    38 Ethyl-2 Cyanoacrylate - -    39 Quaternium 15 (Dowicil 200) - -    40 Decyl Glucoside - -      PRESERVATIVES & ANTIMICROBIALS         No Substance 2 days 4 days remarks   41  1,2-Benzisothiazoline-3-One, Sodium Salt - -    42  1,3,5-Antwan (2-Hydroxyethyl) - Hexahydrotriazine (Grotan BK) - -    43 5-Vxyjwbjiemkxu-9-Nitro-1, 3-Propanediol - -    44  3, 4, 4' - Triclocarban - -    45 4 - Chloro - 3 - Cresol - -    46 4 - Chloro - 4 - Xylenol (PCMX) - -    47 7-Ethylbicyclooxazolidine (Bioban HC4942) - -    48 Benzalkonium Chloride - -    49 Benzyl Alcohol - -    50 Cetalkonium Chloride - -    51 Cetylpyrimidine Chloride  - -    NA Chloroacetamide NA NA    52 DMDM Hydantoin - -    53 Glutaraldehyde - -    54 Triclosan - -    55 Glyoxal Trimeric Dihydrate (+) + 7mm Erosion   56 Iodopropynyl Butylcarbamate - -    57 Octylisothiazoline - -    58 Iodoform - -    59 (Nitrobutyl) Morpholine/(Ethylnitro-Trimethylene) Dimorpholine ([facility] P 1487) - -    60 Phenoxyethanol - -    61 Phenyl Salicylate - -     62 Povidone Iodine - -    63 Sodium Benzoate - -    64 Sodium Disulfite - -    65 Sorbic Acid - -    66 Thimerosal - -     Parabens      67 Butyl-P-Hydroxybenzoate - -    68 Ethyl-P-Hydroxybenzoate - -    69 Methyl-P-Hydroxybenzoate - -    70 Propyl-P-Hydroxybenzoate - -      EMULSIFIERS & ADDITIVES        No Substance 2 days 4 days remarks   71 Polyethylene Glycol-400 (+) -    72 Cocamidopropyl Betaine - -    73 Amerchol L101 - -    74 Propylene Glycol - -    75 Triethanolamine - -    76 Sorbitane Sesquiolate - -    77 Isopropylmyristate (+) -    78 Polysorbate 80  - -    79 Amidoamine   (Stearamidopropyl Dimethylamine) - -    80 Oleamidopropyl Dimethylamine - -    81 Lauryl Glucoside - -    82 Coconut Diethanolamide  - -    83 2-Hydroxy-4-Methoxy Benzophenone (Oxybenzone) - -    84 Benzophenone-4 (Sulisobenzon) - -    85 Propolis - -    86 Dexpanthenol - -    87 Carboxymethyl Cellulose Sodium - -    88 Abitol - -    89 Tert-Butylhydroquinone - -    90 Benzyl Salicylate - -     Antioxidant      91 Dodecyl Gallate - -    92 Butylhydroxyanisole (BHA) - -    93 Butylhydroxytoluene (BHT) - -    94 Di-Alpha-Tocopherol (Vit E) - -    95 Propyl Gallate - -      CORTICOSTEROIDS    No Substance 2 days 4 days remarks Allergy  class   96 Amcinonide - -  B   97 Betametasone-17,21 Dipropionate - -  D1   98 Desoximetasone - -  C   99 Betamethasone-17-Valerate - -  D1   100 Dexamethasone - -  C   101 Hydrocortisone - -  A   102 Clobetasol-17-Propionate - -  D1   103 Dexamethasone-21-Phosphate Disodium Salt - -  C   104 Hydrocortisone-17 Butyrate - -  D2   105 Prednisolone - -  A   106 Mometason Furoate - -  D1   107 Triamcinolone Acetonide - -  B   108 Methylprednisolone Aceponate - -  D2   109 Hydrocortisone-21-Acetate - -  A   110 Prednicarbate - -  D2       Group Characteristics of group Generic name Name  cross reactions   A Hydrocortisone   Cloprednole, Fludrocortisone acétate, Hydrocortison acetate, Methylprednisolone,  Prednisolone, Tixocortolpivalate Alfacortone, Fucidin H, Dermacalm, Hexacortone, Premandole, Imacort With group D2   B Triamcinolone-acetonide   Budenoside (R-isomer), Amcinonide, Desonide, Fluocinolone acetonide, Triamcinolone acetonide Locapred, Locatop  Synalar, Pevisone, [place] -   C Betamethasone (Without Romina)   Betamethasone, Dexamethasone, Flumethasone pivalate, Halomethasone Daivobet, Dexasalyl, Locasalen,   -   D1 Betamethasone-diproprionate   Betamethasone dipropionate, Betamethasone-17-valerate, Clobetasole-propionate, Fluticasone propionate, Mometasone furoate Betnovate, Diprogenta, Diprosalic, Diprosone, Celestoderm, Fucicort,  Cutivate, Axotide, Elocom -   D2 Methylprednisolone-aceponate   Hydrocortisone-aceponate, Hydrocortisone-buteprate, Hydrocortisone-17-butyrate, Methylprednisolone aceponate, Prednicarbate Locoïd, Advantan,  Prednitop With group A and Budesonide (S-isomer)     Results of patch tests:                         Interpretation:    - Negative                    A    = Allergic      (+) Erythema    TI   = Toxic/irritant   + E + Infiltration    RaP = Relevance at Present     ++ E/I + Papulovesicle   Rpr  = Relevance Previously     +++ E/I/P + Blister     nR   = No Relevance    Atopy Screen (07/08/2019)    No Substance Readings (15min) Evaluation   POS Histamine 1mg/ml ++    NEG NaCl 0.9% -      No Substance Readings (15min) Evaluation   1 Alternaria alternata (tenuis)  -    2 Cladosporium herbarum -    3 Aspergillus fumigatus -    4 Penicillium notatum -    5 Dermatophagoides pteronyssinus +++    6 Dermatophagoides farinae -    7 Dog epithelium (canis spp) -    8 Cat hair (steve catus) -    9 Cockroach   (Blatella americana & germanica) -    10 Grass mix midwest   (Noris, Orchard, Redtop, Brayden) -    11  grass (sorghum halepense) -    12 Weed mix   (common Cocklebur, Lamb s quarters, rough redroot Pigweed, Dock/Sorrel) -    13 Mugwort (artemisia vulgare) -    14 Ragweed  giant/short (ambrosia spp) -    15 English Plantain (plantago lanceolata) -    16 Tree mix 1 (Pecan, Maple BHR, Oak RVW, american [place], black [place]) NA    17 Red cedar (juniperus virginia) -    18 Tree mix 2   (white Dirk, river/red Birch, black [place], common [place], american Elm) NA    19 Box elder/Maple mix (acer spp) -    20 Coachella shagbark (carya ovata) -       -    Conclusions: Patient is atopic with clear immediate type reaction to D. Pteronyssinus (house dust mite). Patient should observe the molds and house dust mite areas and report back with photos if any delayed reaction there       [x] Allergic reaction diagnosed against: against metals (Cobalt, Nickel and chromate) and as well angry back --> could go well with atopic dermatitis   ==> the reactions are all erosions and not typical allergic, infiltrated lesions.     Interpretation/ remarks:   First of all, after removal patient has angry back (see photo). Based on that the results are difficult to obtain. However, all the reactions were well defined erosions without infiltration and therefore rather irritant reactions with underlying atopic dermatitis than real allergic reactions.  The only real reaction seems to be to the metals, particularly Nickel, which would go well with atopic dermatitis    Final Diagnosis:  - Generalized, subacute to chronic eczema with spongiosis and peripheral and blood Eosinophilia ==> probably atopic dermatitis   --> some indications for allergic contact dermatitis to nickel and several, mostly irritant reactions to Parabens,  Methyldibromo Gluturonitrile and  Glyoxal Trimeric Dihydrate   DDx  drug allergy (Atorvastatin, Pantoprazole), cutaneous T cell Lymphoma (bone marrow in [place] normal)    Total IgE, CBC, spec IgE D. Pteronyssinus --> [facility] in [facility]?    --> Plan in future prick tests with HDM and moulds       Impression/Plan:    1. Severe exantematic, generalized Atopic Dermatitis: Atopy skin prick test on 7/8/2019  with clear immediate type reaction to D. Pteronyssinus (house dust mite).     Has been on Dupixent since 10/17/18. Initially 600mg subcutaneously, currently 300mg every other week. Initially some concern for drug reaction, but allergic patch testing showed back erosion consistent with atopic dermatitis with no specific allergy.     Could consider patch test in the future given previous patch test (10/2018) was completed during an acute eczematous phase, so results were not clear on parabens and Methyldibromo Glutaronitrile.     [facility] to [facility] CBC, kidney, liver, and inflammatory panels every couple months.    Use gentle free and clear soaps and shampoos    Suspicious for allergy of metals in the coke can, or additives in the coke. Advised patient to avoid drinking pop out of cans.   --> atopic predisposition with clinically relevant sensitization to house dust mites (D. Pteronyssinus) with Rhinitis entire day = given infos for house dust mite reduction      Patient unable to obtain prednisone burst in the interim.     The dupixent really helped the patient. However, especially at the end of the two weeks he starts to have slight break-through and flare ups which indicates that he needs to continue with the dupixent treatment.  --> HAVE to continue every 2 weeks dupixent injection at 300 mg every 14 days for at least another six months (important to keep that frequency because patient is controlled but still produces flare-ups at the end of 2 weeks)    For the continuation of the treatment of this atopic dermatitis I am certainly happy to further follow the patient. If [facility] prefers another dermat[facility], I have no problems with that.    Continue to cut down the flare-ups with short-term use of topical triamcinolone 0.01% ointment prn to be applied to hands and arms as needed. Provided refills today.    Prescribed oral prednisone taper to be taken PRN for severe flare-ups. The taper, if necessary, would be 50mg to start  and everyday 10mg reduction daily, to take in the morning over x5 days    Continue Vanicream Cream. Put on entire skin 2-3 times per day. Pt has very dry skin and might use one jar every 1-2 weeks. Refills provided today.    Return to clinic or [facility]-preferred dermat[facility] if this persists or return in December for intradermal test for dust mites and molds with photos.       2. Seborrheic Dermatitis   - Start ketoconazole 2% shampoo - apply three times a week     Follow-up in 4 months unless the [facility] prefers another dermatologist, then follow up with the [facility]'s preferred dermatologist.    Forward a copy of the notes to the patient's PCP in the [facility].      Staff Involved:    Scribe Disclosure  I, Jade Santana, am serving as a scribe to document services personally performed by Dr. Leon Lanier, based on data collection and the provider's statements to me.     Start Time: 1:06 PM  End Time: 1:21 PM    I spent a total of 15minutes face to face with Mike Naidu during today s office visit. Over 50% of this time was spent counseling the patient and/or coordinating care. Please see Assessment and Plan for details.            Again, thank you for allowing me to participate in the care of your patient.        Sincerely,        Leon Lanier MD

## 2019-11-29 NOTE — NURSING NOTE
Chief Complaint   Patient presents with     Derm Problem     [facility]  Patient states he has been seeing you for a couple of years. Patient states he did not get his predisone due to [facility] not getting [facility]. Patient needs a new script for Dupixent.        Michelle Angulo LPN

## 2019-11-29 NOTE — PROGRESS NOTES
[facility] Allergy Note    Allergy Clinic  [facility]  [address] [place] [id number]    Allergy Problem List:  1. Atopic dermatitis: atopy screen positive to D. Pteronyssinus (house dust mite) - 7/8/2019  -Currently improving. S/p dupixent initiation 10/17/18. Initial concern for drug rash after starting Dupixent, but more consistent with atopic dermatitis flare.    Continue:  - Vanicream over entire body 2-3 times/day  - Dupixent 300mg every other week  - Triamcinolone 0.1% cream BID to eczematous patches 2 times a day for 2 weeks.   - Free and clear shampoos and soaps    2. Seborrheic Dermatitis   - Ketoconazole shampoo     Encounter Date: Nov 29, 2019    CC:  Chief Complaint   Patient presents with     Derm Problem     [facility]  Patient states he has been seeing you for a couple of years. Patient states he did not get his predisone due to [facility]. Patient needs a new scropt for Dupixent.        History of Present Illness:  Mr. Mike Naidu is a 69 year old male who presents for follow-up of atopic dermatitis. He was last seen 3/18/2019. Patient has been on Dupixent since October 2018 and has dramatic improvement in his skin. Patient continues to report intermittent pruritus, mostly on his bilateral arms, back, and stomach. He receives dupixent every other week (next dose due 7/10). He applies Vanicream at least daily, sometimes more frequently to affected areas. He only applies triamcinolone 0.1% cream to the affected areas as needed.       Hx since 7/8/19:  Today (10/4/19) the pt comes in today for followup. He reports that he is breaking out. This flare up started 2.5 weeks ago without obvious source. Gaetano thinks this started improving last night. He currently has a widespread red, pruritic rash He continues the Dupixent injection every 2 weeks with his last injection yesterday. He is no  longer taking the Zyrtec.    Hx since 10/4/19:  The patient comes in today (11/29/19) for followup. He reports that he was unable to obtain the medications prescribed at the last visit, notably the prednisone burst.  He states that he has been taking dupixent every 2 weeks and gets flare-ups that are intermittent. States that the symptoms subside after dupixent injection for a time but then he will still get flare-ups.  States that he has not used the triamcinolone cream in the interim due to it being a steroid.   No side effects from the dupixent.  The patient is otherwise feeling well today. There are no other allergy concerns at this time.      Past Medical History:   - No history of eczema in childhood. No history of asthma.     No past surgical history on file.    Social History:  Patient reports that he has never smoked. He has never used smokeless tobacco. 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     Family History:  Atopic dermatitis in 2 of his grandchildren.     Medications:  Current Outpatient Medications   Medication Sig Dispense Refill     atorvastatin (LIPITOR) 80 MG tablet        cetirizine (ZYRTEC) 10 MG tablet        doxepin (SINEQUAN) 50 MG capsule        Dupilumab (DUPIXENT) 300 MG/2ML syringe Inject 2 mLs (300 mg) Subcutaneous every 14 days for 12 doses 24 mL 0     emollient (VANICREAM) cream Apply topically as needed for other Put on entire skin 2-3 times per day. Very dry skin and might use one jar every 1-2 weeks. 453 g 8     emollient (VANICREAM) external cream Apply topically 3 times daily Apply to entire body three times a day to help maintain skin barrier. 453 g 3     FLOMAX 0.4 MG capsule        folic acid (FOLVITE) 1 MG tablet        gabapentin (NEURONTIN) 300 MG capsule        hydrOXYzine (ATARAX) 25 MG tablet        ketoconazole (NIZORAL) 2 % external shampoo Apply topically three times a week Wash hair 120 mL 1     pantoprazole  (PROTONIX) 40 MG EC tablet        predniSONE (DELTASONE) 20 MG tablet Start at 50mg (today and tomorrow morning) and then reduce by 5mg every day ==> therefore after 11 days stop Prednisone  Please provide patient with enough 20mg and 5mg Tabl 22 tablet 0     predniSONE (DELTASONE) 5 MG tablet Take 2 tablets (10 mg) by mouth daily 15 tablet 0     RANITIDINE 150 MAX STRENGTH 150 MG tablet        triamcinolone (KENALOG) 0.1 % cream        Allergies   Allergen Reactions     Procaine Swelling     Sulfa Drugs Swelling and Itching     swelling     Valproic Acid Itching and Rash     hives         Review of Systems:  -Constitutional: Otherwise feeling well today, in usual state of health.  -Skin: As above in HPI. No additional skin concerns.    Physical exam:  Vitals: There were no vitals taken for this visit.  GEN: This is a well developed, well-nourished male in no acute distress, in a pleasant mood.    SKIN: Focused examination of the hands, arms, legs, back, abdomen, and face was performed.  - Scattered areas of excoriations on bilateral arms and legs with with crusted papules.  - Diffusely dry skin   - Striae of skin and atrophy with hypopigmented streaks on bilateral arms   - Overall improvement in dermatitis       Previous Patch Testing for Reference:    Order for PATCH TESTS    [] Outpatient  [] Inpatient: [facility]..../ Bed ....      Skin Atopy (atopic dermatitis) [x] Yes   [] No  Rhinitis/Sinusitis:   [x] Yes   [] No  Allergic Asthma:   [] Yes   [x] No  Food Allergy:   [] Yes   [x] No  Leg ulcers:   [] Yes   [x] No  Hand eczema:   [x] Yes   [] No   Leading hand:   [x] R   [] L       [] Ambidextrous                        Reason for tests (suspected allergy): generalized subacute to chronic eczema with spongiosis and peripheral and skin Eosinophilia  Known previous allergies: Sulphonamides, Valproic acid and Procaine    Standardized panels  [x] Standard panel (40 tests)  [x] Preservatives & Antimicrobials (31  tests)  [x] Emulsifiers & Additives (25 tests)   [] Perfumes/Flavours & Plants (25 tests)  [] Hairdresser panel (12 tests)  [] Rubber Chemicals (22 tests)  [] Plastics (26 tests)  [] Colorants/Dyes/Food additives (20 tests)  [] Metals (implants/dental) (23 tests)  [] Local anaesthetics/NSAIDs (12 tests)  [] Antibiotics & Antimycotics (14 tests)   [x] Corticosteroids (15 tests)   [] Photopatch test (32 tests)   [] others: ...      [] Patient's own products: ...    DO NOT test if chemical or biological identity is unknown!     always ask from patient the product information and safety sheets (MSDS)     [] Patient needs consultation with Allergy team (changes of tests may apply)  [x] Tests discussed with Allergy team (can have direct appointment for patch tests)    RESULTS & EVALUATION of PATCH TESTS    Date/time of application:  Physician/Nurse:  / Felicia Ceballos LPN               Localization of application: Back --> on lower back chronic eczema, but upper back can be used for patch tests. Rather dry there, but no acute eczema    Patch test readings after     [x] 2 days, [] 3 days [x] 4 days, [] 5 days,   Other duration: ...    Applied patch tests with results (import here the list of patch tests):  Date/time of application:10/01/18   Physician/Nurse:  / Felicia Ceballos LPN               Localization of application: Back     STANDARD Series         No Substance 2 days 4 days remarks   1 Jhony Mix [C] - -    2 Colophony - -    3  2-Mercaptobenzothiazole  - -     4 Methylisothiazolinone - -    5 Carba Mix - -    6 Thiurma Mix [A] - -    7 Bisphenol A Epoxy Resin - -    8 H-Txxx-Taqcmxabegi-Formaldehyde Resin - -    9 Mercapto Mix [A] - -    10 Black Rubber Mix- PPD [B] - -    11 Potassium Dichromate  + +/++ 5mm erosion   12 Balsam of [place] (Myroxylon Pereirae Resin) - -    13 Nickel Sulphate Hexahydrate +/++ ++ 14mm erosion   14 Mixed Dialkyl Thiourea - -    15 Paraben Mix [B] (+) + 4mm  erosion   16 Methyldibromo Glutaronitrile - + 4mm erosion   17 Fragrance Mix - -    18 2-Bromo-2-Nitropropane-1,3-Diol (Bronopol) - -    19 Lyral - -    20 Tixocortol-21- Pivalate - -    21 Diazolidiyl Urea (Germall II) - -    22 Methyl Methacrylate - -    23 Cobalt (II) Chloride Hexahydrate + -    24 Fragrance Mix II  - -    25 Compositae Mix - -    26 Benzoyl Peroxide - -    27 Bacitracin - -    28 Formaldehyde - -    29 Methylchloroisothiazolinone / Methylisothiazolinone - -    30 Corticosteroid Mix - -    31 Sodium Lauryl Sulfate - -    32 Lanolin Alcohol - -    33 Turpentine - -    34 Cetylstearylalcohol - -    35 Chlorhexidine Dicluconate - -    36 Budenoside - -    37 Imidazolidinyl Urea  - -    38 Ethyl-2 Cyanoacrylate - -    39 Quaternium 15 (Dowicil 200) - -    40 Decyl Glucoside - -      PRESERVATIVES & ANTIMICROBIALS         No Substance 2 days 4 days remarks   41  1,2-Benzisothiazoline-3-One, Sodium Salt - -    42  1,3,5-Antwan (2-Hydroxyethyl) - Hexahydrotriazine (Grotan BK) - -    43 7-Hlvfpvfcwgyxx-1-Nitro-1, 3-Propanediol - -    44  3, 4, 4' - Triclocarban - -    45 4 - Chloro - 3 - Cresol - -    46 4 - Chloro - 4 - Xylenol (PCMX) - -    47 7-Ethylbicyclooxazolidine (Bioban XI3125) - -    48 Benzalkonium Chloride - -    49 Benzyl Alcohol - -    50 Cetalkonium Chloride - -    51 Cetylpyrimidine Chloride  - -    NA Chloroacetamide NA NA    52 DMDM Hydantoin - -    53 Glutaraldehyde - -    54 Triclosan - -    55 Glyoxal Trimeric Dihydrate (+) + 7mm Erosion   56 Iodopropynyl Butylcarbamate - -    57 Octylisothiazoline - -    58 Iodoform - -    59 (Nitrobutyl) Morpholine/(Ethylnitro-Trimethylene) Dimorpholine (Bioban P 1487) - -    60 Phenoxyethanol - -    61 Phenyl Salicylate - -    62 Povidone Iodine - -    63 Sodium Benzoate - -    64 Sodium Disulfite - -    65 Sorbic Acid - -    66 Thimerosal - -     Parabens      67 Butyl-P-Hydroxybenzoate - -    68 Ethyl-P-Hydroxybenzoate - -    69  Methyl-P-Hydroxybenzoate - -    70 Propyl-P-Hydroxybenzoate - -      EMULSIFIERS & ADDITIVES        No Substance 2 days 4 days remarks   71 Polyethylene Glycol-400 (+) -    72 Cocamidopropyl Betaine - -    73 Amerchol L101 - -    74 Propylene Glycol - -    75 Triethanolamine - -    76 Sorbitane Sesquiolate - -    77 Isopropylmyristate (+) -    78 Polysorbate 80  - -    79 Amidoamine   (Stearamidopropyl Dimethylamine) - -    80 Oleamidopropyl Dimethylamine - -    81 Lauryl Glucoside - -    82 Coconut Diethanolamide  - -    83 2-Hydroxy-4-Methoxy Benzophenone (Oxybenzone) - -    84 Benzophenone-4 (Sulisobenzon) - -    85 Propolis - -    86 Dexpanthenol - -    87 Carboxymethyl Cellulose Sodium - -    88 Abitol - -    89 Tert-Butylhydroquinone - -    90 Benzyl Salicylate - -     Antioxidant      91 Dodecyl Gallate - -    92 Butylhydroxyanisole (BHA) - -    93 Butylhydroxytoluene (BHT) - -    94 Di-Alpha-Tocopherol (Vit E) - -    95 Propyl Gallate - -      CORTICOSTEROIDS    No Substance 2 days 4 days remarks Allergy  class   96 Amcinonide - -  B   97 Betametasone-17,21 Dipropionate - -  D1   98 Desoximetasone - -  C   99 Betamethasone-17-Valerate - -  D1   100 Dexamethasone - -  C   101 Hydrocortisone - -  A   102 Clobetasol-17-Propionate - -  D1   103 Dexamethasone-21-Phosphate Disodium Salt - -  C   104 Hydrocortisone-17 Butyrate - -  D2   105 Prednisolone - -  A   106 Mometason Furoate - -  D1   107 Triamcinolone Acetonide - -  B   108 Methylprednisolone Aceponate - -  D2   109 Hydrocortisone-21-Acetate - -  A   110 Prednicarbate - -  D2       Group Characteristics of group Generic name Name  cross reactions   A Hydrocortisone   Cloprednole, Fludrocortisone acétate, Hydrocortison acetate, Methylprednisolone, Prednisolone, Tixocortolpivalate Alfacortone, Fucidin H, Dermacalm, Hexacortone, Premandole, Imacort With group D2   B Triamcinolone-acetonide   Budenoside (R-isomer), Amcinonide, Desonide, Fluocinolone  acetonide, Triamcinolone acetonide Locapred, Locatop  Synalar, Pevisone, Ken[place]ort -   C Betamethasone (Without Romina)   Betamethasone, Dexamethasone, Flumethasone pivalate, Halomethasone Daivobet, Dexasalyl, Locasalen,   -   D1 Betamethasone-diproprionate   Betamethasone dipropionate, Betamethasone-17-valerate, Clobetasole-propionate, Fluticasone propionate, Mometasone furoate Betnovate, Diprogenta, Diprosalic, Diprosone, Celestoderm, Fucicort,  Cutivate, Axotide, Elocom -   D2 Methylprednisolone-aceponate   Hydrocortisone-aceponate, Hydrocortisone-buteprate, Hydrocortisone-17-butyrate, Methylprednisolone aceponate, Prednicarbate Locoïd, Advantan,  Prednitop With group A and Budesonide (S-isomer)     Results of patch tests:                         Interpretation:    - Negative                    A    = Allergic      (+) Erythema    TI   = Toxic/irritant   + E + Infiltration    RaP = Relevance at Present     ++ E/I + Papulovesicle   Rpr  = Relevance Previously     +++ E/I/P + Blister     nR   = No Relevance    Atopy Screen (07/08/2019)    No Substance Readings (15min) Evaluation   POS Histamine 1mg/ml ++    NEG NaCl 0.9% -      No Substance Readings (15min) Evaluation   1 Alternaria alternata (tenuis)  -    2 Cladosporium herbarum -    3 Aspergillus fumigatus -    4 Penicillium notatum -    5 Dermatophagoides pteronyssinus +++    6 Dermatophagoides farinae -    7 Dog epithelium (canis spp) -    8 Cat hair (steve catus) -    9 Cockroach   (Blatella americana & germanica) -    10 Grass mix midwest   (Noris, Orchard, Redtop, Brayden) -    11  grass (sorghum halepense) -    12 Weed mix   (common Cocklebur, Lamb s quarters, rough redroot Pigweed, Dock/Sorrel) -    13 Mugwort (artemisia vulgare) -    14 Ragweed giant/short (ambrosia spp) -    15 English Plantain (plantago lanceolata) -    16 Tree mix 1 (Pecan, Maple BHR, Oak RVW, american Globe, black [place]) NA    17 Red cedar (juniperus virginia) -    18 Tree  mix 2   (white Dirk, river/red Birch, black [place], common Martin, american Elm) NA    19 Box elder/Maple mix (acer spp) -    20 Pueblo shagbark (carya ovata) -       -    Conclusions: Patient is atopic with clear immediate type reaction to D. Pteronyssinus (house dust mite). Patient should observe the molds and house dust mite areas and report back with photos if any delayed reaction there       [x] Allergic reaction diagnosed against: against metals (Cobalt, Nickel and chromate) and as well angry back --> could go well with atopic dermatitis   ==> the reactions are all erosions and not typical allergic, infiltrated lesions.     Interpretation/ remarks:   First of all, after removal patient has angry back (see photo). Based on that the results are difficult to obtain. However, all the reactions were well defined erosions without infiltration and therefore rather irritant reactions with underlying atopic dermatitis than real allergic reactions.  The only real reaction seems to be to the metals, particularly Nickel, which would go well with atopic dermatitis    Final Diagnosis:  - Generalized, subacute to chronic eczema with spongiosis and peripheral and blood Eosinophilia ==> probably atopic dermatitis   --> some indications for allergic contact dermatitis to nickel and several, mostly irritant reactions to Parabens,  Methyldibromo Gluturonitrile and  Glyoxal Trimeric Dihydrate   DDx  drug allergy (Atorvastatin, Pantoprazole), cutaneous T cell Lymphoma (bone marrow in [place] normal)    Total IgE, CBC, spec IgE D. Pteronyssinus --> [facility]?    --> Plan in future prick tests with HDM and moulds       Impression/Plan:    1. Severe exantematic, generalized Atopic Dermatitis: Atopy skin prick test on 7/8/2019 with clear immediate type reaction to D. Pteronyssinus (house dust mite).     Has been on Dupixent since 10/17/18. Initially 600mg subcutaneously, currently 300mg every other week. Initially some concern for  drug reaction, but allergic patch testing showed back erosion consistent with atopic dermatitis with no specific allergy.     Could consider patch test in the future given previous patch test (10/2018) was completed during an acute eczematous phase, so results were not clear on parabens and Methyldibromo Glutaronitrile.     [facility], kidney, liver, and inflammatory panels every couple months.    Use gentle free and clear soaps and shampoos    Suspicious for allergy of metals in the coke can, or additives in the coke. Advised patient to avoid drinking pop out of cans.   --> atopic predisposition with clinically relevant sensitization to house dust mites (D. Pteronyssinus) with Rhinitis entire day = given infos for house dust mite reduction      Patient unable to obtain prednisone burst in the interim.     The dupixent really helped the patient. However, especially at the end of the two weeks he starts to have slight break-through and flare ups which indicates that he needs to continue with the dupixent treatment.  --> HAVE to continue every 2 weeks dupixent injection at 300 mg every 14 days for at least another six months (important to keep that frequency because patient is controlled but still produces flare-ups at the end of 2 weeks)    For the continuation of the treatment of this atopic dermatitis I am certainly happy to further follow the patient. If [facility] prefers another [facility], I have no problems with that.    Continue to cut down the flare-ups with short-term use of topical triamcinolone 0.01% ointment prn to be applied to hands and arms as needed. Provided refills today.    Prescribed oral prednisone taper to be taken PRN for severe flare-ups. The taper, if necessary, would be 50mg to start and everyday 10mg reduction daily, to take in the morning over x5 days    Continue Vanicream Cream. Put on entire skin 2-3 times per day. Pt has very dry skin and might use one jar every 1-2 weeks. Refills  provided today.    Return to clinic or [facility]-preferred dermat[facility] if this persists or return in December for intradermal test for dust mites and molds with photos.       2. Seborrheic Dermatitis   - Start ketoconazole 2% shampoo - apply three times a week     Follow-up in 4 months unless the [facility] prefers another dermatologist, then follow up with the [facility]'s preferred dermatologist.    Forward a copy of the notes to the patient[facility] in the [facility].      Staff Involved:    Scribe Disclosure  I, Jade Santana, am serving as a scribe to document services personally performed by Dr. Leon Lanier, based on data collection and the provider's statements to me.     Start Time: 1:06 PM  End Time: 1:21 PM    I spent a total of 15minutes face to face with Mike Naidu during today s office visit. Over 50% of this time was spent counseling the patient and/or coordinating care. Please see Assessment and Plan for details.

## 2020-01-17 ENCOUNTER — TELEPHONE (OUTPATIENT)
Dept: DERMATOLOGY | Facility: CLINIC | Age: 70
End: 2020-01-17

## 2020-01-17 NOTE — TELEPHONE ENCOUNTER
I called the patient and [facility]. He needs to have the last office visit note sent to the fax number listed below so that he can get paid for his travel to be seen here. I let him know that it looks like it was sent back in December. He states that they never got the note. I let him know I would double check with our clinic and I would fax the note over to the [facility].   Jeniffer Cali, CMA

## 2020-01-17 NOTE — TELEPHONE ENCOUNTER
[facility] Call Center    Phone Message    May a detailed message be left on voicemail: yes    Reason for Call: Form or Letter   Type or form/letter needing completion: Notes from 11/29/2019 needing Office notes to be [facility] to [facility]   Provider: Yannick Quintero  Date form needed: ASAP, 1/17/2020, Pt is wanting it done today.   Once completed: Fax form to: [phone]      Action Taken: Message routed to:  [facility] Derm

## 2020-01-21 NOTE — TELEPHONE ENCOUNTER
[facility] Call Center    Phone Message    May a detailed message be left on voicemail: yes    Reason for Call: Pt called again and stated that [facility] still has not received the notes from [facility]/29 for his visit with Dr. Lanier. Pt very upset and wants a call back with an update.     Action Taken: Message routed to:  [facility] Dermatology Clinic

## 2020-01-21 NOTE — TELEPHONE ENCOUNTER
Spoke with patient, let him know that we are re-faxing documentation.   1/21/2020 @ 1:54PM to [phone]. Patient confirmed fax number as being correct.

## 2020-02-26 ENCOUNTER — OFFICE VISIT (OUTPATIENT)
Dept: ALLERGY | Facility: CLINIC | Age: 70
End: 2020-02-26
Payer: COMMERCIAL

## 2020-02-26 DIAGNOSIS — L21.9 DERMATITIS, SEBORRHEIC: ICD-10-CM

## 2020-02-26 DIAGNOSIS — L20.81 ATOPIC NEURODERMATITIS: ICD-10-CM

## 2020-02-26 DIAGNOSIS — L20.89 OTHER ATOPIC DERMATITIS: ICD-10-CM

## 2020-02-26 RX ORDER — KETOCONAZOLE 20 MG/ML
SHAMPOO TOPICAL
Qty: 120 ML | Refills: 1 | Status: SHIPPED | OUTPATIENT
Start: 2020-02-26 | End: 2020-02-26

## 2020-02-26 RX ORDER — EMOLLIENT BASE
CREAM (GRAM) TOPICAL
Qty: 453 G | Refills: 8 | Status: SHIPPED | OUTPATIENT
Start: 2020-02-26 | End: 2020-02-26

## 2020-02-26 RX ORDER — TRIAMCINOLONE ACETONIDE 1 MG/G
OINTMENT TOPICAL
Qty: 80 G | Refills: 6 | Status: SHIPPED | OUTPATIENT
Start: 2020-02-26 | End: 2020-02-26

## 2020-02-26 RX ORDER — PREDNISONE 10 MG/1
TABLET ORAL
Qty: 15 TABLET | Refills: 0 | Status: SHIPPED | OUTPATIENT
Start: 2020-02-26 | End: 2020-07-20

## 2020-02-26 RX ORDER — PREDNISONE 10 MG/1
TABLET ORAL
Qty: 15 TABLET | Refills: 0 | Status: SHIPPED | OUTPATIENT
Start: 2020-02-26 | End: 2020-02-26

## 2020-02-26 RX ORDER — KETOCONAZOLE 20 MG/ML
SHAMPOO TOPICAL
Qty: 120 ML | Refills: 1 | Status: SHIPPED | OUTPATIENT
Start: 2020-02-26 | End: 2020-07-20

## 2020-02-26 RX ORDER — TRIAMCINOLONE ACETONIDE 1 MG/G
OINTMENT TOPICAL
Qty: 80 G | Refills: 6 | Status: SHIPPED | OUTPATIENT
Start: 2020-02-26 | End: 2020-07-20

## 2020-02-26 RX ORDER — EMOLLIENT BASE
CREAM (GRAM) TOPICAL
Qty: 453 G | Refills: 8 | Status: SHIPPED | OUTPATIENT
Start: 2020-02-26 | End: 2020-07-20

## 2020-02-26 ASSESSMENT — PAIN SCALES - GENERAL: PAINLEVEL: NO PAIN (0)

## 2020-02-26 NOTE — LETTER
2/26/2020         RE: Mike Naidu  [address] [id number]        Dear Colleague,    Thank you for referring your patient, Mike Naidu, to the [facility] ALLERGY. Please see a copy of my visit note below.    [facility] Allergy Note    Allergy Clinic  [facility]  [address] [place] [id number]    Allergy Problem List:  1. Atopic dermatitis: atopy screen positive to D. Pteronyssinus (house dust mite) - 7/8/2019  -Currently improving. S/p dupixent initiation 10/17/18. Initial concern for drug rash after starting Dupixent, but more consistent with atopic dermatitis flare.    Continue:  - Vanicream over entire body 2-3 times/day  - Dupixent 300mg every other week  - Triamcinolone 0.1% cream BID to eczematous patches 2 times a day for 2 weeks.   - Free and clear shampoos and soaps    2. Seborrheic Dermatitis   - Ketoconazole shampoo     Encounter Date: Feb 26, 2020    CC:  Chief Complaint   Patient presents with     Allergies     [facility]. [facility]        History of Present Illness:  Mr. Mike Naidu is a 69 year old male who presents for follow-up of atopic dermatitis. He was last seen 3/18/2019. Patient has been on Dupixent since October 2018 and has dramatic improvement in his skin. Patient continues to report intermittent pruritus, mostly on his bilateral arms, back, and stomach. He receives dupixent every other week (next dose due 7/10). He applies Vanicream at least daily, sometimes more frequently to affected areas. He only applies triamcinolone 0.1% cream to the affected areas as needed.       Hx since 7/8/19:  Today (10/4/19) the pt comes in today for followup. He reports that he is breaking out. This flare up started 2.5 weeks ago without obvious source. Gaetano thinks this started improving last night. He  currently has a widespread red, pruritic rash He continues the Dupixent injection every 2 weeks with his last injection yesterday. He is no longer taking the Zyrtec.    Hx since 10/4/19:  The patient comes in today (11/29/19) for followup. He reports that he was unable to obtain the medications prescribed at the last visit, notably the prednisone burst.  He states that he has been taking dupixent every 2 weeks and gets flare-ups that are intermittent. States that the symptoms subside after dupixent injection for a time but then he will still get flare-ups.  States that he has not used the triamcinolone cream in the interim due to it being a steroid.   No side effects from the dupixent.  The patient is otherwise feeling well today. There are no other allergy concerns at this time.      Past Medical History:   - No history of eczema in childhood. No history of asthma.     No past surgical history on file.    Social History:  Patient reports that he has never smoked. He has never used smokeless tobacco. 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     Family History:  Atopic dermatitis in 2 of his grandchildren.     Medications:  Current Outpatient Medications   Medication Sig Dispense Refill     atorvastatin (LIPITOR) 80 MG tablet        cetirizine (ZYRTEC) 10 MG tablet        doxepin (SINEQUAN) 50 MG capsule        emollient (VANICREAM) external cream Put on entire skin 2-3 times per day. Very dry skin and might use one jar every 1-2 weeks. 453 g 8     emollient (VANICREAM) external cream Apply topically 3 times daily Apply to entire body three times a day to help maintain skin barrier. 453 g 3     FLOMAX 0.4 MG capsule        folic acid (FOLVITE) 1 MG tablet        gabapentin (NEURONTIN) 300 MG capsule        hydrOXYzine (ATARAX) 25 MG tablet        ketoconazole (NIZORAL) 2 % external shampoo Apply topically three times a week Wash hair 120 mL 1     pantoprazole  (PROTONIX) 40 MG EC tablet        predniSONE (DELTASONE) 20 MG tablet Start at 50mg (today and tomorrow morning) and then reduce by 5mg every day ==> therefore after 11 days stop Prednisone  Please provide patient with enough 20mg and 5mg Tabl 22 tablet 0     predniSONE (DELTASONE) 5 MG tablet Take 2 tablets (10 mg) by mouth daily 15 tablet 0     RANITIDINE 150 MAX STRENGTH 150 MG tablet        triamcinolone (KENALOG) 0.1 % cream        triamcinolone (KENALOG) 0.1 % external ointment If flare ups, use for 3-4 days on itchy, eczematous lesions 1-2 times daily 80 g 6     Allergies   Allergen Reactions     Procaine Swelling     Sulfa Drugs Swelling and Itching     swelling     Valproic Acid Itching and Rash     hives         Review of Systems:  -Constitutional: Otherwise feeling well today, in usual state of health.  -Skin: As above in HPI. No additional skin concerns.    Physical exam:  Vitals: There were no vitals taken for this visit.  GEN: This is a well developed, well-nourished male in no acute distress, in a pleasant mood.    SKIN: Focused examination of the hands, arms, legs, back, abdomen, and face was performed.  - over abdominal and flank and gluteal area erythema and slight infiltration and as well inner part of tights.  However, remarkable improvement      Previous Patch Testing for Reference:    Order for PATCH TESTS    [] Outpatient  [] Inpatient: [facility]..../ Bed ....      Skin Atopy (atopic dermatitis) [x] Yes   [] No  Rhinitis/Sinusitis:   [x] Yes   [] No  Allergic Asthma:   [] Yes   [x] No  Food Allergy:   [] Yes   [x] No  Leg ulcers:   [] Yes   [x] No  Hand eczema:   [x] Yes   [] No   Leading hand:   [x] R   [] L       [] Ambidextrous                        Reason for tests (suspected allergy): generalized subacute to chronic eczema with spongiosis and peripheral and skin Eosinophilia  Known previous allergies: Sulphonamides, Valproic acid and Procaine    Standardized panels  [x] Standard panel (40  tests)  [x] Preservatives & Antimicrobials (31 tests)  [x] Emulsifiers & Additives (25 tests)   [] Perfumes/Flavours & Plants (25 tests)  [] Hairdresser panel (12 tests)  [] Rubber Chemicals (22 tests)  [] Plastics (26 tests)  [] Colorants/Dyes/Food additives (20 tests)  [] Metals (implants/dental) (23 tests)  [] Local anaesthetics/NSAIDs (12 tests)  [] Antibiotics & Antimycotics (14 tests)   [x] Corticosteroids (15 tests)   [] Photopatch test (32 tests)   [] others: ...      [] Patient's own products: ...    DO NOT test if chemical or biological identity is unknown!     always ask from patient the product information and safety sheets (MSDS)     [] Patient needs consultation with Allergy team (changes of tests may apply)  [x] Tests discussed with Allergy team (can have direct appointment for patch tests)    RESULTS & EVALUATION of PATCH TESTS    Date/time of application:  Physician/Nurse:  / Felicia Ceballos LPN               Localization of application: Back --> on lower back chronic eczema, but upper back can be used for patch tests. Rather dry there, but no acute eczema    Patch test readings after     [x] 2 days, [] 3 days [x] 4 days, [] 5 days,   Other duration: ...    Applied patch tests with results (import here the list of patch tests):  Date/time of application:10/01/18   Physician/Nurse: Dr.Bigliardi Casie Ceballos LPN               Localization of application: Back     STANDARD Series         No Substance 2 days 4 days remarks   1 Jhony Mix [C] - -    2 Colophony - -    3  2-Mercaptobenzothiazole  - -     4 Methylisothiazolinone - -    5 Carba Mix - -    6 Thiurma Mix [A] - -    7 Bisphenol A Epoxy Resin - -    8 Q-Gdkl-Ryukmbyfepu-Formaldehyde Resin - -    9 Mercapto Mix [A] - -    10 Black Rubber Mix- PPD [B] - -    11 Potassium Dichromate  + +/++ 5mm erosion   12 Balsam of [place] (Myroxylon Pereirae Resin) - -    13 Nickel Sulphate Hexahydrate +/++ ++ 14mm erosion   14 Mixed Dialkyl  Thiourea - -    15 Paraben Mix [B] (+) + 4mm erosion   16 Methyldibromo Glutaronitrile - + 4mm erosion   17 Fragrance Mix - -    18 2-Bromo-2-Nitropropane-1,3-Diol (Bronopol) - -    19 Lyral - -    20 Tixocortol-21- Pivalate - -    21 Diazolidiyl Urea (Germall II) - -    22 Methyl Methacrylate - -    23 Cobalt (II) Chloride Hexahydrate + -    24 Fragrance Mix II  - -    25 Compositae Mix - -    26 Benzoyl Peroxide - -    27 Bacitracin - -    28 Formaldehyde - -    29 Methylchloroisothiazolinone / Methylisothiazolinone - -    30 Corticosteroid Mix - -    31 Sodium Lauryl Sulfate - -    32 Lanolin Alcohol - -    33 Turpentine - -    34 Cetylstearylalcohol - -    35 Chlorhexidine Dicluconate - -    36 Budenoside - -    37 Imidazolidinyl Urea  - -    38 Ethyl-2 Cyanoacrylate - -    39 Quaternium 15 (Dowicil 200) - -    40 Decyl Glucoside - -      PRESERVATIVES & ANTIMICROBIALS         No Substance 2 days 4 days remarks   41  1,2-Benzisothiazoline-3-One, Sodium Salt - -    42  1,3,5-Antwan (2-Hydroxyethyl) - Hexahydrotriazine (Grotan BK) - -    43 3-Qijemcrveeheo-2-Nitro-1, 3-Propanediol - -    44  3, 4, 4' - Triclocarban - -    45 4 - Chloro - 3 - Cresol - -    46 4 - Chloro - 4 - Xylenol (PCMX) - -    47 7-Ethylbicyclooxazolidine (Bioban QO1442) - -    48 Benzalkonium Chloride - -    49 Benzyl Alcohol - -    50 Cetalkonium Chloride - -    51 Cetylpyrimidine Chloride  - -    NA Chloroacetamide NA NA    52 DMDM Hydantoin - -    53 Glutaraldehyde - -    54 Triclosan - -    55 Glyoxal Trimeric Dihydrate (+) + 7mm Erosion   56 Iodopropynyl Butylcarbamate - -    57 Octylisothiazoline - -    58 Iodoform - -    59 (Nitrobutyl) Morpholine/(Ethylnitro-Trimethylene) Dimorpholine (Bioban P 1487) - -    60 Phenoxyethanol - -    61 Phenyl Salicylate - -    62 Povidone Iodine - -    63 Sodium Benzoate - -    64 Sodium Disulfite - -    65 Sorbic Acid - -    66 Thimerosal - -     Parabens      67 Butyl-P-Hydroxybenzoate - -    68  Ethyl-P-Hydroxybenzoate - -    69 Methyl-P-Hydroxybenzoate - -    70 Propyl-P-Hydroxybenzoate - -      EMULSIFIERS & ADDITIVES        No Substance 2 days 4 days remarks   71 Polyethylene Glycol-400 (+) -    72 Cocamidopropyl Betaine - -    73 Amerchol L101 - -    74 Propylene Glycol - -    75 Triethanolamine - -    76 Sorbitane Sesquiolate - -    77 Isopropylmyristate (+) -    78 Polysorbate 80  - -    79 Amidoamine   (Stearamidopropyl Dimethylamine) - -    80 Oleamidopropyl Dimethylamine - -    81 Lauryl Glucoside - -    82 Coconut Diethanolamide  - -    83 2-Hydroxy-4-Methoxy Benzophenone (Oxybenzone) - -    84 Benzophenone-4 (Sulisobenzon) - -    85 Propolis - -    86 Dexpanthenol - -    87 Carboxymethyl Cellulose Sodium - -    88 Abitol - -    89 Tert-Butylhydroquinone - -    90 Benzyl Salicylate - -     Antioxidant      91 Dodecyl Gallate - -    92 Butylhydroxyanisole (BHA) - -    93 Butylhydroxytoluene (BHT) - -    94 Di-Alpha-Tocopherol (Vit E) - -    95 Propyl Gallate - -      CORTICOSTEROIDS    No Substance 2 days 4 days remarks Allergy  class   96 Amcinonide - -  B   97 Betametasone-17,21 Dipropionate - -  D1   98 Desoximetasone - -  C   99 Betamethasone-17-Valerate - -  D1   100 Dexamethasone - -  C   101 Hydrocortisone - -  A   102 Clobetasol-17-Propionate - -  D1   103 Dexamethasone-21-Phosphate Disodium Salt - -  C   104 Hydrocortisone-17 Butyrate - -  D2   105 Prednisolone - -  A   106 Mometason Furoate - -  D1   107 Triamcinolone Acetonide - -  B   108 Methylprednisolone Aceponate - -  D2   109 Hydrocortisone-21-Acetate - -  A   110 Prednicarbate - -  D2       Group Characteristics of group Generic name Name  cross reactions   A Hydrocortisone   Cloprednole, Fludrocortisone acétate, Hydrocortison acetate, Methylprednisolone, Prednisolone, Tixocortolpivalate Alfacortone, Fucidin H, Dermacalm, Hexacortone, Premandole, Imacort With group D2   B Triamcinolone-acetonide   Budenoside (R-isomer),  Amcinonide, Desonide, Fluocinolone acetonide, Triamcinolone acetonide Locapred, Locatop  Synalar, Pevisone, Kenacort -   C Betamethasone (Without Romina)   Betamethasone, Dexamethasone, Flumethasone pivalate, Halomethasone Daivobet, Dexasalyl, Locasalen,   -   D1 Betamethasone-diproprionate   Betamethasone dipropionate, Betamethasone-17-valerate, Clobetasole-propionate, Fluticasone propionate, Mometasone furoate Betnovate, Diprogenta, Diprosalic, Diprosone, Celestoderm, Fucicort,  Cutivate, Axotide, Elocom -   D2 Methylprednisolone-aceponate   Hydrocortisone-aceponate, Hydrocortisone-buteprate, Hydrocortisone-17-butyrate, Methylprednisolone aceponate, Prednicarbate Locoïd, Advantan,  Prednitop With group A and Budesonide (S-isomer)     Results of patch tests:                         Interpretation:    - Negative                    A    = Allergic      (+) Erythema    TI   = Toxic/irritant   + E + Infiltration    RaP = Relevance at Present     ++ E/I + Papulovesicle   Rpr  = Relevance Previously     +++ E/I/P + Blister     nR   = No Relevance    Atopy Screen (07/08/2019)    No Substance Readings (15min) Evaluation   POS Histamine 1mg/ml ++    NEG NaCl 0.9% -      No Substance Readings (15min) Evaluation   1 Alternaria alternata (tenuis)  -    2 Cladosporium herbarum -    3 Aspergillus fumigatus -    4 Penicillium notatum -    5 Dermatophagoides pteronyssinus +++    6 Dermatophagoides farinae -    7 Dog epithelium (canis spp) -    8 Cat hair (steve catus) -    9 Cockroach   (Blatella americana & germanica) -    10 Grass mix midwest   (Noris, Orchard, Redtop, Brayden) -    11  grass (sorghum halepense) -    12 Weed mix   (common Cocklebur, Lamb s quarters, rough redroot Pigweed, Dock/Sorrel) -    13 Mugwort (artemisia vulgare) -    14 Ragweed giant/short (ambrosia spp) -    15 English Plantain (plantago lanceolata) -    16 Tree mix 1 (Pecan, Maple BHR, Oak RVW, american [place], black [place]) NA    17 Red cedar  (juniperus virginia) -    18 Tree mix 2   (white Dirk, river/red Birch, black [place], common [place], american Elm) NA    19 Box elder/Maple mix (acer spp) -    20 Corson shagbark (carya ovata) -       -    Conclusions: Patient is atopic with clear immediate type reaction to D. Pteronyssinus (house dust mite). Patient should observe the molds and house dust mite areas and report back with photos if any delayed reaction there       [x] Allergic reaction diagnosed against: against metals (Cobalt, Nickel and chromate) and as well angry back --> could go well with atopic dermatitis   ==> the reactions are all erosions and not typical allergic, infiltrated lesions.     Interpretation/ remarks:   First of all, after removal patient has angry back (see photo). Based on that the results are difficult to obtain. However, all the reactions were well defined erosions without infiltration and therefore rather irritant reactions with underlying atopic dermatitis than real allergic reactions.  The only real reaction seems to be to the metals, particularly Nickel, which would go well with atopic dermatitis         Impression/Plan:    >> Severe exantematic, generalized Atopic Dermatitis: Atopy skin prick test on 7/8/2019 with clear immediate type reaction to D. Pteronyssinus (house dust mite).     Has been on Dupixent since 10/17/18. Initially 600mg subcutaneously, currently 300mg every other week.     Could consider patch test in the future given previous patch test (10/2018) was completed during an acute eczematous phase, so results were not clear on parabens and Methyldibromo Glutaronitrile.     [facility] CBC, kidney, liver, and inflammatory panels every couple months.    Use gentle free and clear soaps and shampoos  ==> atopic predisposition with clinically relevant sensitization to house dust mites (D. Pteronyssinus) with Rhinitis entire day = given infos for house dust mite reduction    PLAN      Continue with every 2  weeks Dupixent (controls the Exanthema, but not totally gone) .--> HAVE to continue every 2 weeks dupixent injection at 300 mg every 14 days for at least another six months (important to keep that frequency because patient is controlled but still produces flare-ups at the end of 2 weeks)    [facility] wants for the moment the patient to be followed up by our clinic ([facility])    Continue using topical triamcinolone 0.01% ointment about 2x weekly    Prescribed oral prednisone taper to be taken PRN for severe flare-ups. The taper, if necessary, would be 50mg to start and everyday 10mg reduction daily, to take in the morning over x5 days    Continue Vanicream Cream. Put on entire skin 2-3 times per day. Pt has very dry skin and might use one jar every 1-2 weeks. Refills provided today.    Return to clinic or [facility]-preferred dermat[facility] if this persists or return in December for intradermal test for dust mites and molds with photos.       2. Seborrheic Dermatitis   - Start ketoconazole 2% shampoo - apply three times a week     Follow-up in 6 months unless the [facility] prefers another dermatologist, then follow up with the [facility]'s preferred dermatologist.    Forward a copy of the notes to the patient's P[facility] in the [facility].    I spent a total of 13 minutes face to face with Mike Naidu during today s office visit. Over 50% of this time was spent counseling the patient and/or coordinating care. Please see Assessment and Plan for details.            Again, thank you for allowing me to participate in the care of your patient.        Sincerely,        Leon Lanier MD

## 2020-02-26 NOTE — NURSING NOTE
Chief Complaint   Patient presents with     Allergies     [facility], seeing [facility] doctor in May. He needs refill of [facility] to [facility].      Michelle Angulo LPN

## 2020-02-26 NOTE — PROGRESS NOTES
[facility] Allergy Note    Allergy Clinic  [facility]  [address] [place] [id number]    Allergy Problem List:  1. Atopic dermatitis: atopy screen positive to D. Pteronyssinus (house dust mite) - 7/8/2019  -Currently improving. S/p dupixent initiation 10/17/18. Initial concern for drug rash after starting Dupixent, but more consistent with atopic dermatitis flare.    Continue:  - Vanicream over entire body 2-3 times/day  - Dupixent 300mg every other week  - Triamcinolone 0.1% cream BID to eczematous patches 2 times a day for 2 weeks.   - Free and clear shampoos and soaps    2. Seborrheic Dermatitis   - Ketoconazole shampoo     Encounter Date: Feb 26, 2020    CC:  Chief Complaint   Patient presents with     Allergies     [facility]. [facility]        History of Present Illness:  Mr. Mike Naidu is a 69 year old male who presents for follow-up of atopic dermatitis. He was last seen 3/18/2019. Patient has been on Dupixent since October 2018 and has dramatic improvement in his skin. Patient continues to report intermittent pruritus, mostly on his bilateral arms, back, and stomach. He receives dupixent every other week (next dose due 7/10). He applies Vanicream at least daily, sometimes more frequently to affected areas. He only applies triamcinolone 0.1% cream to the affected areas as needed.       Hx since 7/8/19:  Today (10/4/19) the pt comes in today for followup. He reports that he is breaking out. This flare up started 2.5 weeks ago without obvious source. Gaetano thinks this started improving last night. He currently has a widespread red, pruritic rash He continues the Dupixent injection every 2 weeks with his last injection yesterday. He is no longer taking the Zyrtec.    Hx since 10/4/19:  The patient comes in today (11/29/19) for followup. He  reports that he was unable to obtain the medications prescribed at the last visit, notably the prednisone burst.  He states that he has been taking dupixent every 2 weeks and gets flare-ups that are intermittent. States that the symptoms subside after dupixent injection for a time but then he will still get flare-ups.  States that he has not used the triamcinolone cream in the interim due to it being a steroid.   No side effects from the dupixent.  The patient is otherwise feeling well today. There are no other allergy concerns at this time.      Past Medical History:   - No history of eczema in childhood. No history of asthma.     No past surgical history on file.    Social History:  Patient reports that he has never smoked. He has never used smokeless tobacco. 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     Family History:  Atopic dermatitis in 2 of his grandchildren.     Medications:  Current Outpatient Medications   Medication Sig Dispense Refill     atorvastatin (LIPITOR) 80 MG tablet        cetirizine (ZYRTEC) 10 MG tablet        doxepin (SINEQUAN) 50 MG capsule        emollient (VANICREAM) external cream Put on entire skin 2-3 times per day. Very dry skin and might use one jar every 1-2 weeks. 453 g 8     emollient (VANICREAM) external cream Apply topically 3 times daily Apply to entire body three times a day to help maintain skin barrier. 453 g 3     FLOMAX 0.4 MG capsule        folic acid (FOLVITE) 1 MG tablet        gabapentin (NEURONTIN) 300 MG capsule        hydrOXYzine (ATARAX) 25 MG tablet        ketoconazole (NIZORAL) 2 % external shampoo Apply topically three times a week Wash hair 120 mL 1     pantoprazole (PROTONIX) 40 MG EC tablet        predniSONE (DELTASONE) 20 MG tablet Start at 50mg (today and tomorrow morning) and then reduce by 5mg every day ==> therefore after 11 days stop Prednisone  Please provide patient with enough 20mg and 5mg Tabl 22  tablet 0     predniSONE (DELTASONE) 5 MG tablet Take 2 tablets (10 mg) by mouth daily 15 tablet 0     RANITIDINE 150 MAX STRENGTH 150 MG tablet        triamcinolone (KENALOG) 0.1 % cream        triamcinolone (KENALOG) 0.1 % external ointment If flare ups, use for 3-4 days on itchy, eczematous lesions 1-2 times daily 80 g 6     Allergies   Allergen Reactions     Procaine Swelling     Sulfa Drugs Swelling and Itching     swelling     Valproic Acid Itching and Rash     hives         Review of Systems:  -Constitutional: Otherwise feeling well today, in usual state of health.  -Skin: As above in HPI. No additional skin concerns.    Physical exam:  Vitals: There were no vitals taken for this visit.  GEN: This is a well developed, well-nourished male in no acute distress, in a pleasant mood.    SKIN: Focused examination of the hands, arms, legs, back, abdomen, and face was performed.  - over abdominal and flank and gluteal area erythema and slight infiltration and as well inner part of tights.  However, remarkable improvement      Previous Patch Testing for Reference:    Order for PATCH TESTS    [] Outpatient  [] Inpatient: [facility]..../ Bed ....      Skin Atopy (atopic dermatitis) [x] Yes   [] No  Rhinitis/Sinusitis:   [x] Yes   [] No  Allergic Asthma:   [] Yes   [x] No  Food Allergy:   [] Yes   [x] No  Leg ulcers:   [] Yes   [x] No  Hand eczema:   [x] Yes   [] No   Leading hand:   [x] R   [] L       [] Ambidextrous                        Reason for tests (suspected allergy): generalized subacute to chronic eczema with spongiosis and peripheral and skin Eosinophilia  Known previous allergies: Sulphonamides, Valproic acid and Procaine    Standardized panels  [x] Standard panel (40 tests)  [x] Preservatives & Antimicrobials (31 tests)  [x] Emulsifiers & Additives (25 tests)   [] Perfumes/Flavours & Plants (25 tests)  [] Hairdresser panel (12 tests)  [] Rubber Chemicals (22 tests)  [] Plastics (26 tests)  [] Colorants/Dyes/Food  additives (20 tests)  [] Metals (implants/dental) (23 tests)  [] Local anaesthetics/NSAIDs (12 tests)  [] Antibiotics & Antimycotics (14 tests)   [x] Corticosteroids (15 tests)   [] Photopatch test (32 tests)   [] others: ...      [] Patient's own products: ...    DO NOT test if chemical or biological identity is unknown!     always ask from patient the product information and safety sheets (MSDS)     [] Patient needs consultation with Allergy team (changes of tests may apply)  [x] Tests discussed with Allergy team (can have direct appointment for patch tests)    RESULTS & EVALUATION of PATCH TESTS    Date/time of application:  Physician/Nurse:  / Felicia Ceballos LPN               Localization of application: Back --> on lower back chronic eczema, but upper back can be used for patch tests. Rather dry there, but no acute eczema    Patch test readings after     [x] 2 days, [] 3 days [x] 4 days, [] 5 days,   Other duration: ...    Applied patch tests with results (import here the list of patch tests):  Date/time of application:10/01/18   Physician/Nurse:  / Felicia Ceballos LPN               Localization of application: Back     STANDARD Series         No Substance 2 days 4 days remarks   1 Jhony Mix [C] - -    2 Colophony - -    3  2-Mercaptobenzothiazole  - -     4 Methylisothiazolinone - -    5 Carba Mix - -    6 Thiurma Mix [A] - -    7 Bisphenol A Epoxy Resin - -    8 P-Hcwc-Ygvmwhmgiri-Formaldehyde Resin - -    9 Mercapto Mix [A] - -    10 Black Rubber Mix- PPD [B] - -    11 Potassium Dichromate  + +/++ 5mm erosion   12 Balsam of [place] (Myroxylon Pereirae Resin) - -    13 Nickel Sulphate Hexahydrate +/++ ++ 14mm erosion   14 Mixed Dialkyl Thiourea - -    15 Paraben Mix [B] (+) + 4mm erosion   16 Methyldibromo Glutaronitrile - + 4mm erosion   17 Fragrance Mix - -    18 2-Bromo-2-Nitropropane-1,3-Diol (Bronopol) - -    19 Lyral - -    20 Tixocortol-21- Pivalate - -    21 Diazolidiyl Urea  (Germall II) - -    22 Methyl Methacrylate - -    23 Cobalt (II) Chloride Hexahydrate + -    24 Fragrance Mix II  - -    25 Compositae Mix - -    26 Benzoyl Peroxide - -    27 Bacitracin - -    28 Formaldehyde - -    29 Methylchloroisothiazolinone / Methylisothiazolinone - -    30 Corticosteroid Mix - -    31 Sodium Lauryl Sulfate - -    32 Lanolin Alcohol - -    33 Turpentine - -    34 Cetylstearylalcohol - -    35 Chlorhexidine Dicluconate - -    36 Budenoside - -    37 Imidazolidinyl Urea  - -    38 Ethyl-2 Cyanoacrylate - -    39 Quaternium 15 (Dowicil 200) - -    40 Decyl Glucoside - -      PRESERVATIVES & ANTIMICROBIALS         No Substance 2 days 4 days remarks   41  1,2-Benzisothiazoline-3-One, Sodium Salt - -    42  1,3,5-Antwan (2-Hydroxyethyl) - Hexahydrotriazine (Grotan BK) - -    43 9-Hcssilrimmrht-8-Nitro-1, 3-Propanediol - -    44  3, 4, 4' - Triclocarban - -    45 4 - Chloro - 3 - Cresol - -    46 4 - Chloro - 4 - Xylenol (PCMX) - -    47 7-Ethylbicyclooxazolidine (Bioban IH0535) - -    48 Benzalkonium Chloride - -    49 Benzyl Alcohol - -    50 Cetalkonium Chloride - -    51 Cetylpyrimidine Chloride  - -    NA Chloroacetamide NA NA    52 DMDM Hydantoin - -    53 Glutaraldehyde - -    54 Triclosan - -    55 Glyoxal Trimeric Dihydrate (+) + 7mm Erosion   56 Iodopropynyl Butylcarbamate - -    57 Octylisothiazoline - -    58 Iodoform - -    59 (Nitrobutyl) Morpholine/(Ethylnitro-Trimethylene) Dimorpholine (Bioban P 1487) - -    60 Phenoxyethanol - -    61 Phenyl Salicylate - -    62 Povidone Iodine - -    63 Sodium Benzoate - -    64 Sodium Disulfite - -    65 Sorbic Acid - -    66 Thimerosal - -     Parabens      67 Butyl-P-Hydroxybenzoate - -    68 Ethyl-P-Hydroxybenzoate - -    69 Methyl-P-Hydroxybenzoate - -    70 Propyl-P-Hydroxybenzoate - -      EMULSIFIERS & ADDITIVES        No Substance 2 days 4 days remarks   71 Polyethylene Glycol-400 (+) -    72 Cocamidopropyl Betaine - -    73 Amerchol L101 -  -    74 Propylene Glycol - -    75 Triethanolamine - -    76 Sorbitane Sesquiolate - -    77 Isopropylmyristate (+) -    78 Polysorbate 80  - -    79 Amidoamine   (Stearamidopropyl Dimethylamine) - -    80 Oleamidopropyl Dimethylamine - -    81 Lauryl Glucoside - -    82 Coconut Diethanolamide  - -    83 2-Hydroxy-4-Methoxy Benzophenone (Oxybenzone) - -    84 Benzophenone-4 (Sulisobenzon) - -    85 Propolis - -    86 Dexpanthenol - -    87 Carboxymethyl Cellulose Sodium - -    88 Abitol - -    89 Tert-Butylhydroquinone - -    90 Benzyl Salicylate - -     Antioxidant      91 Dodecyl Gallate - -    92 Butylhydroxyanisole (BHA) - -    93 Butylhydroxytoluene (BHT) - -    94 Di-Alpha-Tocopherol (Vit E) - -    95 Propyl Gallate - -      CORTICOSTEROIDS    No Substance 2 days 4 days remarks Allergy  class   96 Amcinonide - -  B   97 Betametasone-17,21 Dipropionate - -  D1   98 Desoximetasone - -  C   99 Betamethasone-17-Valerate - -  D1   100 Dexamethasone - -  C   101 Hydrocortisone - -  A   102 Clobetasol-17-Propionate - -  D1   103 Dexamethasone-21-Phosphate Disodium Salt - -  C   104 Hydrocortisone-17 Butyrate - -  D2   105 Prednisolone - -  A   106 Mometason Furoate - -  D1   107 Triamcinolone Acetonide - -  B   108 Methylprednisolone Aceponate - -  D2   109 Hydrocortisone-21-Acetate - -  A   110 Prednicarbate - -  D2       Group Characteristics of group Generic name Name  cross reactions   A Hydrocortisone   Cloprednole, Fludrocortisone acétate, Hydrocortison acetate, Methylprednisolone, Prednisolone, Tixocortolpivalate Alfacortone, Fucidin H, Dermacalm, Hexacortone, Premandole, Imacort With group D2   B Triamcinolone-acetonide   Budenoside (R-isomer), Amcinonide, Desonide, Fluocinolone acetonide, Triamcinolone acetonide Locapred, Locatop  Synalar, Pevisone, Kenacort -   C Betamethasone (Without Romina)   Betamethasone, Dexamethasone, Flumethasone pivalate, Halomethasone Daivobet, Dexasalyl, Locasalen,   -   D1  Betamethasone-diproprionate   Betamethasone dipropionate, Betamethasone-17-valerate, Clobetasole-propionate, Fluticasone propionate, Mometasone furoate Betnovate, Diprogenta, Diprosalic, Diprosone, Celestoderm, Fucicort,  Cutivate, Axotide, Elocom -   D2 Methylprednisolone-aceponate   Hydrocortisone-aceponate, Hydrocortisone-buteprate, Hydrocortisone-17-butyrate, Methylprednisolone aceponate, Prednicarbate Locoïd, Advantan,  Prednitop With group A and Budesonide (S-isomer)     Results of patch tests:                         Interpretation:    - Negative                    A    = Allergic      (+) Erythema    TI   = Toxic/irritant   + E + Infiltration    RaP = Relevance at Present     ++ E/I + Papulovesicle   Rpr  = Relevance Previously     +++ E/I/P + Blister     nR   = No Relevance    Atopy Screen (07/08/2019)    No Substance Readings (15min) Evaluation   POS Histamine 1mg/ml ++    NEG NaCl 0.9% -      No Substance Readings (15min) Evaluation   1 Alternaria alternata (tenuis)  -    2 Cladosporium herbarum -    3 Aspergillus fumigatus -    4 Penicillium notatum -    5 Dermatophagoides pteronyssinus +++    6 Dermatophagoides farinae -    7 Dog epithelium (canis spp) -    8 Cat hair (steve catus) -    9 Cockroach   (Blatella americana & germanica) -    10 Grass mix midwest   (Noris, Orchard, Redtop, Brayden) -    11  grass (sorghum halepense) -    12 Weed mix   (common Cocklebur, Lamb s quarters, rough redroot Pigweed, Dock/Sorrel) -    13 Mugwort (artemisia vulgare) -    14 Ragweed giant/short (ambrosia spp) -    15 English Plantain (plantago lanceolata) -    16 Tree mix 1 (Pecan, Maple BHR, Oak RVW, american Yulan, black Langeloth) NA    17 Red cedar (juniperus virginia) -    18 Tree mix 2   (white Dirk, river/red Birch, black [place], common [place], american Elm) NA    19 Box elder/Maple mix (acer spp) -    20 Mackinac shagbark (carya ovata) -       -    Conclusions: Patient is atopic with clear immediate  type reaction to D. Pteronyssinus (house dust mite). Patient should observe the molds and house dust mite areas and report back with photos if any delayed reaction there       [x] Allergic reaction diagnosed against: against metals (Cobalt, Nickel and chromate) and as well angry back --> could go well with atopic dermatitis   ==> the reactions are all erosions and not typical allergic, infiltrated lesions.     Interpretation/ remarks:   First of all, after removal patient has angry back (see photo). Based on that the results are difficult to obtain. However, all the reactions were well defined erosions without infiltration and therefore rather irritant reactions with underlying atopic dermatitis than real allergic reactions.  The only real reaction seems to be to the metals, particularly Nickel, which would go well with atopic dermatitis         Impression/Plan:    >> Severe exantematic, generalized Atopic Dermatitis: Atopy skin prick test on 7/8/2019 with clear immediate type reaction to D. Pteronyssinus (house dust mite).     Has been on Dupixent since 10/17/18. Initially 600mg subcutaneously, currently 300mg every other week.     Could consider patch test in the future given previous patch test (10/2018) was completed during an acute eczematous phase, so results were not clear on parabens and Methyldibromo Glutaronitrile.     [facility], kidney, liver, and inflammatory panels every couple months.    Use gentle free and clear soaps and shampoos  ==> atopic predisposition with clinically relevant sensitization to house dust mites (D. Pteronyssinus) with Rhinitis entire day = given infos for house dust mite reduction    PLAN      Continue with every 2 weeks Dupixent (controls the Exanthema, but not totally gone) .--> HAVE to continue every 2 weeks dupixent injection at 300 mg every 14 days for at least another six months (important to keep that frequency because patient is controlled but still produces  flare-ups at the end of 2 weeks)    [facility] wants for the moment the patient to be followed up by our clinic ([facility])    Continue using topical triamcinolone 0.01% ointment about 2x weekly    Prescribed oral prednisone taper to be taken PRN for severe flare-ups. The taper, if necessary, would be 50mg to start and everyday 10mg reduction daily, to take in the morning over x5 days    Continue Vanicream Cream. Put on entire skin 2-3 times per day. Pt has very dry skin and might use one jar every 1-2 weeks. Refills provided today.    Return to clinic or [facility]-preferred dermat[facility] if this persists or return in December for intradermal test for dust mites and molds with photos.       2. Seborrheic Dermatitis   - Start ketoconazole 2% shampoo - apply three times a week     Follow-up in 6 months unless the [facility] prefers another dermatologist, then follow up with the [facility]'s preferred dermatologist.    Forward a copy of the notes to the patient[facility] the [facility].    I spent a total of 13 minutes face to face with Mike Naidu during today s office visit. Over 50% of this time was spent counseling the patient and/or coordinating care. Please see Assessment and Plan for details.

## 2020-07-20 ENCOUNTER — OFFICE VISIT (OUTPATIENT)
Dept: ALLERGY | Facility: CLINIC | Age: 70
End: 2020-07-20
Payer: COMMERCIAL

## 2020-07-20 DIAGNOSIS — L20.9 ATOPIC DERMATITIS, UNSPECIFIED TYPE: ICD-10-CM

## 2020-07-20 DIAGNOSIS — L20.89 OTHER ATOPIC DERMATITIS: ICD-10-CM

## 2020-07-20 DIAGNOSIS — L21.9 DERMATITIS, SEBORRHEIC: ICD-10-CM

## 2020-07-20 DIAGNOSIS — L20.81 ATOPIC NEURODERMATITIS: ICD-10-CM

## 2020-07-20 RX ORDER — EMOLLIENT BASE
CREAM (GRAM) TOPICAL
Qty: 453 G | Refills: 8 | Status: SHIPPED | OUTPATIENT
Start: 2020-07-20 | End: 2020-07-20

## 2020-07-20 RX ORDER — KETOCONAZOLE 20 MG/ML
SHAMPOO TOPICAL
Qty: 120 ML | Refills: 1 | Status: SHIPPED | OUTPATIENT
Start: 2020-07-20 | End: 2021-01-06

## 2020-07-20 RX ORDER — KETOCONAZOLE 20 MG/ML
SHAMPOO TOPICAL
Qty: 120 ML | Refills: 1 | Status: SHIPPED | OUTPATIENT
Start: 2020-07-20 | End: 2020-07-20

## 2020-07-20 RX ORDER — EMOLLIENT BASE
CREAM (GRAM) TOPICAL
Qty: 453 G | Refills: 8 | Status: SHIPPED | OUTPATIENT
Start: 2020-07-20 | End: 2022-07-29

## 2020-07-20 RX ORDER — PREDNISONE 10 MG/1
TABLET ORAL
Qty: 15 TABLET | Refills: 0 | Status: SHIPPED | OUTPATIENT
Start: 2020-07-20 | End: 2020-07-20

## 2020-07-20 RX ORDER — DUPILUMAB 300 MG/2ML
300 INJECTION, SOLUTION SUBCUTANEOUS
Qty: 24 ML | Refills: 0 | OUTPATIENT
Start: 2020-07-20 | End: 2020-07-20

## 2020-07-20 RX ORDER — PREDNISONE 10 MG/1
TABLET ORAL
Qty: 15 TABLET | Refills: 0 | Status: SHIPPED | OUTPATIENT
Start: 2020-07-20 | End: 2020-07-25

## 2020-07-20 RX ORDER — DUPILUMAB 300 MG/2ML
300 INJECTION, SOLUTION SUBCUTANEOUS
Qty: 24 ML | Refills: 0 | Status: SHIPPED | OUTPATIENT
Start: 2020-07-20 | End: 2021-01-06

## 2020-07-20 RX ORDER — TRIAMCINOLONE ACETONIDE 1 MG/G
OINTMENT TOPICAL
Qty: 80 G | Refills: 6 | Status: SHIPPED | OUTPATIENT
Start: 2020-07-20 | End: 2021-01-06

## 2020-07-20 RX ORDER — TRIAMCINOLONE ACETONIDE 1 MG/G
OINTMENT TOPICAL
Qty: 80 G | Refills: 6 | Status: SHIPPED | OUTPATIENT
Start: 2020-07-20 | End: 2020-07-20

## 2020-07-20 RX ORDER — EMOLLIENT BASE
CREAM (GRAM) TOPICAL 3 TIMES DAILY
Qty: 453 G | Refills: 3 | Status: SHIPPED | OUTPATIENT
Start: 2020-07-20 | End: 2021-01-06

## 2020-07-20 ASSESSMENT — PAIN SCALES - GENERAL: PAINLEVEL: NO PAIN (0)

## 2020-07-20 NOTE — PATIENT INSTRUCTIONS
Impression/Plan:     >> Severe exantematic, generalized Atopic Dermatitis: Atopy skin prick test on 7/8/2019 with clear immediate type reaction to D. Pteronyssinus (house dust mite).   ? Has been on Dupixent since 10/17/18. Initially 600mg subcutaneously, currently 300mg every other week.   ? Could consider patch test in the future given previous patch test (10/2018) was completed during an acute eczematous phase, so results were not clear on parabens and Methyldibromo Glutaronitrile.   ? [facility] to check CBC, kidney, liver, and inflammatory panels every couple months.  ? Use gentle free and clear soaps and shampoos  ==> atopic predisposition with clinically relevant sensitization to house dust mites (D. Pteronyssinus) with Rhinitis entire day = given infos for house dust mite reduction     PLAN     ? Continue with every 2 weeks Dupixent (controls the Exanthema, but not totally gone) .--> As of 7/20/20 HAVE to continue every 2 weeks dupixent injection at 300 mg every 14 days for at least another six months (important to keep that frequency because patient is controlled but still produces flare-ups at the end of 2 weeks). Unfortunately would want to reduce to every 3 weeks, but again now having return of symptoms prior to two week interval already.  ? [facility] wants for the moment the patient to be followed up by our clinic ([facility])  ? Continue using topical triamcinolone 0.01% ointment about 2x weekly as necessary  ? Prescribed oral prednisone taper to be taken PRN for severe flare-ups. The taper, if necessary, would be 50mg to start and everyday 10mg reduction daily, to take in the morning over x5 days  ? Continue Vanicream Cream. Put on entire skin 2-3 times per day. Pt has very dry skin and might use one jar every 1-2 weeks. Refills provided today.     Return to clinic or [facility]-preferred dermatologist if this persists or return in December for intradermal test for dust mites and  molds with photos.         2. Seborrheic Dermatitis   - Start ketoconazole 2% shampoo - apply three times a week      Follow-up in 6 months unless the [facility] prefers another dermatologist, then follow up with the [facility]'s preferred dermatologist.     Forward a copy of the notes to the patient's [facility]CP in the [facility].

## 2020-07-20 NOTE — PROGRESS NOTES
[facility] Allergy Note    Allergy Clinic  [facility]  [address] [place] [id number]    Allergy Problem List:  1. Atopic dermatitis: atopy screen positive to D. Pteronyssinus (house dust mite) - 7/8/2019  -Currently improving. S/p dupixent initiation 10/17/18. Initial concern for drug rash after starting Dupixent, but more consistent with atopic dermatitis flare.    Continue:  - Vanicream over entire body 2-3 times/day  - Dupixent 300mg every other week  - Triamcinolone 0.1% cream BID to eczematous patches 2 times a day for 2 weeks.   - Free and clear shampoos and soaps    2. Seborrheic Dermatitis (scalp dermatitis may have been more likely to be due to atopic dermatitis)  - Ketoconazole shampoo     Encounter Date: Jul 20, 2020    CC:  No chief complaint on file.      History of Present Illness:  Mr. Mike Naidu is a 69 year old male who presents for follow-up of atopic dermatitis. He was last seen 3/18/2019. Patient has been on Dupixent since October 2018 and has dramatic improvement in his skin. Patient continues to report intermittent pruritus, mostly on his bilateral arms, back, and stomach. He receives dupixent every other week (next dose due 7/10). He applies Vanicream at least daily, sometimes more frequently to affected areas. He only applies triamcinolone 0.1% cream to the affected areas as needed.       7/20/20: still with occasional erythematous breakouts, mainly around abdomen and trunk, quite pruritic. Still happening every 3 or 4 days, but before dupixent was far more frequent for 4 years.  Feels the dupixent is definitely helping significantly. Also has pruritis when getting out of the hot shower, and so he limits these. Has not had any triggers he notices. Vanicream helps with breakouts. After dupixent injections, gets briefly inflamed, then at the end of the two weeks dermatitis begins to reappear. Thus, still  having slight increase of dermatitis at the end of the two weeks.    Hx since 7/8/19v    Today (10/4/19) the pt comes in today for followup. He reports that he is breaking out. This flare up started 2.5 weeks ago without obvious source. Gaetano thinks this started improving last night. He currently has a widespread red, pruritic rash He continues the Dupixent injection every 2 weeks with his last injection yesterday. He is no longer taking the Zyrtec.    Hx since 10/4/19:  The patient comes in today (11/29/19) for followup. He reports that he was unable to obtain the medications prescribed at the last visit, notably the prednisone burst.  He states that he has been taking dupixent every 2 weeks and gets flare-ups that are intermittent. States that the symptoms subside after dupixent injection for a time but then he will still get flare-ups.  States that he has not used the triamcinolone cream in the interim due to it being a steroid.   No side effects from the dupixent.  The patient is otherwise feeling well today. There are no other allergy concerns at this time.      Past Medical History:   - No history of eczema in childhood. No history of asthma.     No past surgical history on file.    Social History:  Patient reports that he has never smoked. He has never used smokeless tobacco. 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     Family History:  Atopic dermatitis in 2 of his grandchildren.     Medications:  Current Outpatient Medications   Medication Sig Dispense Refill     atorvastatin (LIPITOR) 80 MG tablet        cetirizine (ZYRTEC) 10 MG tablet        doxepin (SINEQUAN) 50 MG capsule        Dupilumab (DUPIXENT) 300 MG/2ML syringe Inject 2 mLs (300 mg) Subcutaneous every 14 days 24 mL 0     Dupilumab (DUPIXENT) 300 MG/2ML syringe Inject 2 mLs (300 mg) Subcutaneous every 14 days 2 mL 3     emollient (VANICREAM) external cream Put on entire skin 2-3 times per day. Very  dry skin and might use one jar every 1-2 weeks. 453 g 8     emollient (VANICREAM) external cream Apply topically 3 times daily Apply to entire body three times a day to help maintain skin barrier. 453 g 3     FLOMAX 0.4 MG capsule        folic acid (FOLVITE) 1 MG tablet        gabapentin (NEURONTIN) 300 MG capsule        hydrOXYzine (ATARAX) 25 MG tablet        ketoconazole (NIZORAL) 2 % external shampoo Wash hair every other day 120 mL 1     pantoprazole (PROTONIX) 40 MG EC tablet        predniSONE (DELTASONE) 20 MG tablet Start at 50mg (today and tomorrow morning) and then reduce by 5mg every day ==> therefore after 11 days stop Prednisone  Please provide patient with enough 20mg and 5mg Tabl 22 tablet 0     predniSONE (DELTASONE) 5 MG tablet Take 2 tablets (10 mg) by mouth daily 15 tablet 0     RANITIDINE 150 MAX STRENGTH 150 MG tablet        triamcinolone (KENALOG) 0.1 % cream        triamcinolone (KENALOG) 0.1 % external ointment If flare ups, use for 3-4 days on itchy, eczematous lesions 1-2 times daily. Otherwise use 2xweekly on remaining lesions 80 g 6     Allergies   Allergen Reactions     Procaine Swelling     Sulfa Drugs Swelling and Itching     swelling     Valproic Acid Itching and Rash     hives         Review of Systems:  -Constitutional: Otherwise feeling well today, in usual state of health.  -Skin: As above in HPI. No additional skin concerns.    Physical exam:  Vitals: There were no vitals taken for this visit.  GEN: This is a well developed, well-nourished male in no acute distress, in a pleasant mood.    SKIN: Focused examination of the hands, arms, legs, back, abdomen, and face was performed.  - over abdominal and flank and gluteal area erythema and slight infiltration and as well inner part of tights.  - No clear extensive excoriations  However, remarkable improvement still      Previous Patch Testing for Reference:    Order for PATCH TESTS    [] Outpatient  [] Inpatient: [facility]..../ Bed  ....      Skin Atopy (atopic dermatitis) [x] Yes   [] No  Rhinitis/Sinusitis:   [x] Yes   [] No  Allergic Asthma:   [] Yes   [x] No  Food Allergy:   [] Yes   [x] No  Leg ulcers:   [] Yes   [x] No  Hand eczema:   [x] Yes   [] No   Leading hand:   [x] R   [] L       [] Ambidextrous                        Reason for tests (suspected allergy): generalized subacute to chronic eczema with spongiosis and peripheral and skin Eosinophilia  Known previous allergies: Sulphonamides, Valproic acid and Procaine    Standardized panels  [x] Standard panel (40 tests)  [x] Preservatives & Antimicrobials (31 tests)  [x] Emulsifiers & Additives (25 tests)   [] Perfumes/Flavours & Plants (25 tests)  [] Hairdresser panel (12 tests)  [] Rubber Chemicals (22 tests)  [] Plastics (26 tests)  [] Colorants/Dyes/Food additives (20 tests)  [] Metals (implants/dental) (23 tests)  [] Local anaesthetics/NSAIDs (12 tests)  [] Antibiotics & Antimycotics (14 tests)   [x] Corticosteroids (15 tests)   [] Photopatch test (32 tests)   [] others: ...      [] Patient's own products: ...    DO NOT test if chemical or biological identity is unknown!     always ask from patient the product information and safety sheets (MSDS)     [] Patient needs consultation with Allergy team (changes of tests may apply)  [x] Tests discussed with Allergy team (can have direct appointment for patch tests)    RESULTS & EVALUATION of PATCH TESTS    Date/time of application:  Physician/Nurse:  / Felicia Ceballos LPN               Localization of application: Back --> on lower back chronic eczema, but upper back can be used for patch tests. Rather dry there, but no acute eczema    Patch test readings after     [x] 2 days, [] 3 days [x] 4 days, [] 5 days,   Other duration: ...    Applied patch tests with results (import here the list of patch tests):  Date/time of application:10/01/18   Physician/Nurse:  / Felicia Ceballos LPN               Localization of  application: Back     STANDARD Series         No Substance 2 days 4 days remarks   1 Jhony Mix [C] - -    2 Colophony - -    3  2-Mercaptobenzothiazole  - -     4 Methylisothiazolinone - -    5 Carba Mix - -    6 Thiurma Mix [A] - -    7 Bisphenol A Epoxy Resin - -    8 O-Spox-Aruudlucnch-Formaldehyde Resin - -    9 Mercapto Mix [A] - -    10 Black Rubber Mix- PPD [B] - -    11 Potassium Dichromate  + +/++ 5mm erosion   12 Balsam of [place] (Myroxylon Pereirae Resin) - -    13 Nickel Sulphate Hexahydrate +/++ ++ 14mm erosion   14 Mixed Dialkyl Thiourea - -    15 Paraben Mix [B] (+) + 4mm erosion   16 Methyldibromo Glutaronitrile - + 4mm erosion   17 Fragrance Mix - -    18 2-Bromo-2-Nitropropane-1,3-Diol (Bronopol) - -    19 Lyral - -    20 Tixocortol-21- Pivalate - -    21 Diazolidiyl Urea (Germall II) - -    22 Methyl Methacrylate - -    23 Cobalt (II) Chloride Hexahydrate + -    24 Fragrance Mix II  - -    25 Compositae Mix - -    26 Benzoyl Peroxide - -    27 Bacitracin - -    28 Formaldehyde - -    29 Methylchloroisothiazolinone / Methylisothiazolinone - -    30 Corticosteroid Mix - -    31 Sodium Lauryl Sulfate - -    32 Lanolin Alcohol - -    33 Turpentine - -    34 Cetylstearylalcohol - -    35 Chlorhexidine Dicluconate - -    36 Budenoside - -    37 Imidazolidinyl Urea  - -    38 Ethyl-2 Cyanoacrylate - -    39 Quaternium 15 (Dowicil 200) - -    40 Decyl Glucoside - -      PRESERVATIVES & ANTIMICROBIALS         No Substance 2 days 4 days remarks   41  1,2-Benzisothiazoline-3-One, Sodium Salt - -    42  1,3,5-Antwan (2-Hydroxyethyl) - Hexahydrotriazine (Grotan BK) - -    43 2-Ajugfzsvbcwqs-3-Nitro-1, 3-Propanediol - -    44  3, 4, 4' - Triclocarban - -    45 4 - Chloro - 3 - Cresol - -    46 4 - Chloro - 4 - Xylenol (PCMX) - -    47 7-Ethylbicyclooxazolidine (Lizzy HE8220) - -    48 Benzalkonium Chloride - -    49 Benzyl Alcohol - -    50 Cetalkonium Chloride - -    51 Cetylpyrimidine Chloride  - -    NA  Chloroacetamide NA NA    52 DMDM Hydantoin - -    53 Glutaraldehyde - -    54 Triclosan - -    55 Glyoxal Trimeric Dihydrate (+) + 7mm Erosion   56 Iodopropynyl Butylcarbamate - -    57 Octylisothiazoline - -    58 Iodoform - -    59 (Nitrobutyl) Morpholine/(Ethylnitro-Trimethylene) Dimorpholine (Bioban P 1487) - -    60 Phenoxyethanol - -    61 Phenyl Salicylate - -    62 Povidone Iodine - -    63 Sodium Benzoate - -    64 Sodium Disulfite - -    65 Sorbic Acid - -    66 Thimerosal - -     Parabens      67 Butyl-P-Hydroxybenzoate - -    68 Ethyl-P-Hydroxybenzoate - -    69 Methyl-P-Hydroxybenzoate - -    70 Propyl-P-Hydroxybenzoate - -      EMULSIFIERS & ADDITIVES        No Substance 2 days 4 days remarks   71 Polyethylene Glycol-400 (+) -    72 Cocamidopropyl Betaine - -    73 Amerchol L101 - -    74 Propylene Glycol - -    75 Triethanolamine - -    76 Sorbitane Sesquiolate - -    77 Isopropylmyristate (+) -    78 Polysorbate 80  - -    79 Amidoamine   (Stearamidopropyl Dimethylamine) - -    80 Oleamidopropyl Dimethylamine - -    81 Lauryl Glucoside - -    82 Coconut Diethanolamide  - -    83 2-Hydroxy-4-Methoxy Benzophenone (Oxybenzone) - -    84 Benzophenone-4 (Sulisobenzon) - -    85 Propolis - -    86 Dexpanthenol - -    87 Carboxymethyl Cellulose Sodium - -    88 Abitol - -    89 Tert-Butylhydroquinone - -    90 Benzyl Salicylate - -     Antioxidant      91 Dodecyl Gallate - -    92 Butylhydroxyanisole (BHA) - -    93 Butylhydroxytoluene (BHT) - -    94 Di-Alpha-Tocopherol (Vit E) - -    95 Propyl Gallate - -      CORTICOSTEROIDS    No Substance 2 days 4 days remarks Allergy  class   96 Amcinonide - -  B   97 Betametasone-17,21 Dipropionate - -  D1   98 Desoximetasone - -  C   99 Betamethasone-17-Valerate - -  D1   100 Dexamethasone - -  C   101 Hydrocortisone - -  A   102 Clobetasol-17-Propionate - -  D1   103 Dexamethasone-21-Phosphate Disodium Salt - -  C   104 Hydrocortisone-17 Butyrate - -  D2   105  Prednisolone - -  A   106 Mometason Furoate - -  D1   107 Triamcinolone Acetonide - -  B   108 Methylprednisolone Aceponate - -  D2   109 Hydrocortisone-21-Acetate - -  A   110 Prednicarbate - -  D2       Group Characteristics of group Generic name Name  cross reactions   A Hydrocortisone   Cloprednole, Fludrocortisone acétate, Hydrocortison acetate, Methylprednisolone, Prednisolone, Tixocortolpivalate Alfacortone, Fucidin H, Dermacalm, Hexacortone, Premandole, Imacort With group D2   B Triamcinolone-acetonide   Budenoside (R-isomer), Amcinonide, Desonide, Fluocinolone acetonide, Triamcinolone acetonide Locapred, Locatop  Synalar, Pevisone, Ken[place] -   C Betamethasone (Without Romina)   Betamethasone, Dexamethasone, Flumethasone pivalate, Halomethasone Daivobet, Dexasalyl, Locasalen,   -   D1 Betamethasone-diproprionate   Betamethasone dipropionate, Betamethasone-17-valerate, Clobetasole-propionate, Fluticasone propionate, Mometasone furoate Betnovate, Diprogenta, Diprosalic, Diprosone, Celestoderm, Fucicort,  Cutivate, Axotide, Elocom -   D2 Methylprednisolone-aceponate   Hydrocortisone-aceponate, Hydrocortisone-buteprate, Hydrocortisone-17-butyrate, Methylprednisolone aceponate, Prednicarbate Locoïd, Advantan,  Prednitop With group A and Budesonide (S-isomer)     Results of patch tests:                         Interpretation:    - Negative                    A    = Allergic      (+) Erythema    TI   = Toxic/irritant   + E + Infiltration    RaP = Relevance at Present     ++ E/I + Papulovesicle   Rpr  = Relevance Previously     +++ E/I/P + Blister     nR   = No Relevance    Atopy Screen (07/08/2019)    No Substance Readings (15min) Evaluation   POS Histamine 1mg/ml ++    NEG NaCl 0.9% -      No Substance Readings (15min) Evaluation   1 Alternaria alternata (tenuis)  -    2 Cladosporium herbarum -    3 Aspergillus fumigatus -    4 Penicillium notatum -    5 Dermatophagoides pteronyssinus +++    6 Dermatophagoides  farinae -    7 Dog epithelium (canis spp) -    8 Cat hair (steve catus) -    9 Cockroach   (Blatella americana & germanica) -    10 Grass mix midwest   (Noris, Orchard, Redtop, Brayden) -    11  grass (sorghum halepense) -    12 Weed mix   (common Cocklebur, Lamb s quarters, rough redroot Pigweed, Dock/Sorrel) -    13 Mugwort (artemisia vulgare) -    14 Ragweed giant/short (ambrosia spp) -    15 English Plantain (plantago lanceolata) -    16 Tree mix 1 (Pecan, Maple BHR, Oak RVW, american [place], black [place]) NA    17 Red cedar (juniperus virginia) -    18 Tree mix 2   (white Dirk, river/red Birch, black [place], common [place], american Elm) NA    19 Box elder/Maple mix (acer spp) -    20 Oneida shagbark (carya ovata) -       -    Conclusions: Patient is atopic with clear immediate type reaction to D. Pteronyssinus (house dust mite). Patient should observe the molds and house dust mite areas and report back with photos if any delayed reaction there       [x] Allergic reaction diagnosed against: against metals (Cobalt, Nickel and chromate) and as well angry back --> could go well with atopic dermatitis   ==> the reactions are all erosions and not typical allergic, infiltrated lesions.     Interpretation/ remarks:   First of all, after removal patient has angry back (see photo). Based on that the results are difficult to obtain. However, all the reactions were well defined erosions without infiltration and therefore rather irritant reactions with underlying atopic dermatitis than real allergic reactions.  The only real reaction seems to be to the metals, particularly Nickel, which would go well with atopic dermatitis         Impression/Plan:    >> Severe exantematic, generalized Atopic Dermatitis: Atopy skin prick test on 7/8/2019 with clear immediate type reaction to D. Pteronyssinus (house dust mite).     Has been on Dupixent since 10/17/18. Initially 600mg subcutaneously, currently 300mg every other  week.     Could consider patch test in the future given previous patch test (10/2018) was completed during an acute eczematous phase, so results were not clear on parabens and Methyldibromo Glutaronitrile.     [facility] to check CBC, kidney, liver, and inflammatory panels every couple months.    Use gentle free and clear soaps and shampoos  ==> atopic predisposition with clinically relevant sensitization to house dust mites (D. Pteronyssinus) with Rhinitis entire day = given infos for house dust mite reduction    PLAN      Continue with every 2 weeks Dupixent (controls the Exanthema, but not totally gone) .--> As of 7/20/20 HAVE to continue every 2 weeks dupixent injection at 300 mg every 14 days for at least another six months (important to keep that frequency because patient is controlled but still produces flare-ups at the end of 2 weeks). Unfortunately would want to reduce to every 3 weeks, but again now having return of symptoms prior to two week interval already.    [facility] wants for the moment the patient to be followed up by our clinic ([facility])    Continue using topical triamcinolone 0.01% ointment about 2x weekly as necessary    Prescribed oral prednisone taper to be taken PRN for severe flare-ups. The taper, if necessary, would be 50mg to start and everyday 10mg reduction daily, to take in the morning over x5 days    Continue Vanicream Cream. Put on entire skin 2-3 times per day. Pt has very dry skin and might use one jar every 1-2 weeks. Refills provided today.    Return to clinic or [facility]-preferred dermatologist if this persists or return in December for intradermal test for dust mites and molds with photos.       2. Seborrheic Dermatitis   - Start ketoconazole 2% shampoo - apply three times a week     Follow-up in 6 months unless the [facility] prefers another dermatologist, then follow up with the [facility]'s preferred dermatologist.    Forward a copy of the notes to the patient's  PC[facility] in the [facility].    I spent a total of 30 minutes face to face with Mike Naidu during today s office visit. Over 50% of this time was spent counseling the patient and/or coordinating care. Please see Assessment and Plan for details.    Staff involved:  - Soniya Ceballos LPN  - Dr. Cortez, resident    Staff Physician Comments:  I saw and evaluated the patient with the resident and I agree with the assessment and plan as documented in the resident's note.    Leon Lanier MD  Professor  Head of Dermato-Allergy Division  Department of Dermatology  [facility]

## 2020-07-20 NOTE — NURSING NOTE
Allergy Rooming Note    Mike Naidu's goals for this visit include:   Chief Complaint   Patient presents with     TRAVIS Farr is here for a dupixent follow up      Felicia Ceballos LPN

## 2020-07-20 NOTE — LETTER
7/20/2020         RE: Mike Naidu  [id number] [facility] [id number]        Dear Colleague,    Thank you for referring your patient, Mike Naidu, to the [facility] ALLERGY. Please see a copy of my visit note below.    [facility] Allergy Note    Allergy Clinic  [facility]  [address] [place] [id number]    Allergy Problem List:  1. Atopic dermatitis: atopy screen positive to D. Pteronyssinus (house dust mite) - 7/8/2019  -Currently improving. S/p dupixent initiation 10/17/18. Initial concern for drug rash after starting Dupixent, but more consistent with atopic dermatitis flare.    Continue:  - Vanicream over entire body 2-3 times/day  - Dupixent 300mg every other week  - Triamcinolone 0.1% cream BID to eczematous patches 2 times a day for 2 weeks.   - Free and clear shampoos and soaps    2. Seborrheic Dermatitis (scalp dermatitis may have been more likely to be due to atopic dermatitis)  - Ketoconazole shampoo     Encounter Date: Jul 20, 2020    CC:  No chief complaint on file.      History of Present Illness:  Mr. Mike Naidu is a 69 year old male who presents for follow-up of atopic dermatitis. He was last seen 3/18/2019. Patient has been on Dupixent since October 2018 and has dramatic improvement in his skin. Patient continues to report intermittent pruritus, mostly on his bilateral arms, back, and stomach. He receives dupixent every other week (next dose due 7/10). He applies Vanicream at least daily, sometimes more frequently to affected areas. He only applies triamcinolone 0.1% cream to the affected areas as needed.       7/20/20: still with occasional erythematous breakouts, mainly around abdomen and trunk, quite pruritic. Still happening every 3 or 4 days, but before dupixent was far more frequent for 4 years.  Feels the dupixent is definitely helping significantly. Also has pruritis when getting  out of the hot shower, and so he limits these. Has not had any triggers he notices. Vanicream helps with breakouts. After dupixent injections, gets briefly inflamed, then at the end of the two weeks dermatitis begins to reappear. Thus, still having slight increase of dermatitis at the end of the two weeks.    Hx since 7/8/19v    Today (10/4/19) the pt comes in today for followup. He reports that he is breaking out. This flare up started 2.5 weeks ago without obvious source. Gaetano thinks this started improving last night. He currently has a widespread red, pruritic rash He continues the Dupixent injection every 2 weeks with his last injection yesterday. He is no longer taking the Zyrtec.    Hx since 10/4/19:  The patient comes in today (11/29/19) for followup. He reports that he was unable to obtain the medications prescribed at the last visit, notably the prednisone burst.  He states that he has been taking dupixent every 2 weeks and gets flare-ups that are intermittent. States that the symptoms subside after dupixent injection for a time but then he will still get flare-ups.  States that he has not used the triamcinolone cream in the interim due to it being a steroid.   No side effects from the dupixent.  The patient is otherwise feeling well today. There are no other allergy concerns at this time.      Past Medical History:   - No history of eczema in childhood. No history of asthma.     No past surgical history on file.    Social History:  Patient reports that he has never smoked. He has never used smokeless tobacco. 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     Family History:  Atopic dermatitis in 2 of his grandchildren.     Medications:  Current Outpatient Medications   Medication Sig Dispense Refill     atorvastatin (LIPITOR) 80 MG tablet        cetirizine (ZYRTEC) 10 MG tablet        doxepin (SINEQUAN) 50 MG capsule        Dupilumab (DUPIXENT) 300 MG/2ML  syringe Inject 2 mLs (300 mg) Subcutaneous every 14 days 24 mL 0     Dupilumab (DUPIXENT) 300 MG/2ML syringe Inject 2 mLs (300 mg) Subcutaneous every 14 days 2 mL 3     emollient (VANICREAM) external cream Put on entire skin 2-3 times per day. Very dry skin and might use one jar every 1-2 weeks. 453 g 8     emollient (VANICREAM) external cream Apply topically 3 times daily Apply to entire body three times a day to help maintain skin barrier. 453 g 3     FLOMAX 0.4 MG capsule        folic acid (FOLVITE) 1 MG tablet        gabapentin (NEURONTIN) 300 MG capsule        hydrOXYzine (ATARAX) 25 MG tablet        ketoconazole (NIZORAL) 2 % external shampoo Wash hair every other day 120 mL 1     pantoprazole (PROTONIX) 40 MG EC tablet        predniSONE (DELTASONE) 20 MG tablet Start at 50mg (today and tomorrow morning) and then reduce by 5mg every day ==> therefore after 11 days stop Prednisone  Please provide patient with enough 20mg and 5mg Tabl 22 tablet 0     predniSONE (DELTASONE) 5 MG tablet Take 2 tablets (10 mg) by mouth daily 15 tablet 0     RANITIDINE 150 MAX STRENGTH 150 MG tablet        triamcinolone (KENALOG) 0.1 % cream        triamcinolone (KENALOG) 0.1 % external ointment If flare ups, use for 3-4 days on itchy, eczematous lesions 1-2 times daily. Otherwise use 2xweekly on remaining lesions 80 g 6     Allergies   Allergen Reactions     Procaine Swelling     Sulfa Drugs Swelling and Itching     swelling     Valproic Acid Itching and Rash     hives         Review of Systems:  -Constitutional: Otherwise feeling well today, in usual state of health.  -Skin: As above in HPI. No additional skin concerns.    Physical exam:  Vitals: There were no vitals taken for this visit.  GEN: This is a well developed, well-nourished male in no acute distress, in a pleasant mood.    SKIN: Focused examination of the hands, arms, legs, back, abdomen, and face was performed.  - over abdominal and flank and gluteal area erythema and  slight infiltration and as well inner part of tights.  - No clear extensive excoriations  However, remarkable improvement still      Previous Patch Testing for Reference:    Order for PATCH TESTS    [] Outpatient  [] Inpatient: [facility]..../ Bed ....      Skin Atopy (atopic dermatitis) [x] Yes   [] No  Rhinitis/Sinusitis:   [x] Yes   [] No  Allergic Asthma:   [] Yes   [x] No  Food Allergy:   [] Yes   [x] No  Leg ulcers:   [] Yes   [x] No  Hand eczema:   [x] Yes   [] No   Leading hand:   [x] R   [] L       [] Ambidextrous                        Reason for tests (suspected allergy): generalized subacute to chronic eczema with spongiosis and peripheral and skin Eosinophilia  Known previous allergies: Sulphonamides, Valproic acid and Procaine    Standardized panels  [x] Standard panel (40 tests)  [x] Preservatives & Antimicrobials (31 tests)  [x] Emulsifiers & Additives (25 tests)   [] Perfumes/Flavours & Plants (25 tests)  [] Hairdresser panel (12 tests)  [] Rubber Chemicals (22 tests)  [] Plastics (26 tests)  [] Colorants/Dyes/Food additives (20 tests)  [] Metals (implants/dental) (23 tests)  [] Local anaesthetics/NSAIDs (12 tests)  [] Antibiotics & Antimycotics (14 tests)   [x] Corticosteroids (15 tests)   [] Photopatch test (32 tests)   [] others: ...      [] Patient's own products: ...    DO NOT test if chemical or biological identity is unknown!     always ask from patient the product information and safety sheets (MSDS)     [] Patient needs consultation with Allergy team (changes of tests may apply)  [x] Tests discussed with Allergy team (can have direct appointment for patch tests)    RESULTS & EVALUATION of PATCH TESTS    Date/time of application:  Physician/Nurse:  / Felicia Ceballos LPN               Localization of application: Back --> on lower back chronic eczema, but upper back can be used for patch tests. Rather dry there, but no acute eczema    Patch test readings after     [x] 2 days, [] 3 days  [x] 4 days, [] 5 days,   Other duration: ...    Applied patch tests with results (import here the list of patch tests):  Date/time of application:10/01/18   Physician/Nurse:  / Felicia Ceballos LPN               Localization of application: Back     STANDARD Series         No Substance 2 days 4 days remarks   1 Jhony Mix [C] - -    2 Colophony - -    3  2-Mercaptobenzothiazole  - -     4 Methylisothiazolinone - -    5 Carba Mix - -    6 Thiurma Mix [A] - -    7 Bisphenol A Epoxy Resin - -    8 R-Dvfv-Jekxomgjulz-Formaldehyde Resin - -    9 Mercapto Mix [A] - -    10 Black Rubber Mix- PPD [B] - -    11 Potassium Dichromate  + +/++ 5mm erosion   12 Balsam of [place] (Myroxylon Pereirae Resin) - -    13 Nickel Sulphate Hexahydrate +/++ ++ 14mm erosion   14 Mixed Dialkyl Thiourea - -    15 Paraben Mix [B] (+) + 4mm erosion   16 Methyldibromo Glutaronitrile - + 4mm erosion   17 Fragrance Mix - -    18 2-Bromo-2-Nitropropane-1,3-Diol (Bronopol) - -    19 Lyral - -    20 Tixocortol-21- Pivalate - -    21 Diazolidiyl Urea (Germall II) - -    22 Methyl Methacrylate - -    23 Cobalt (II) Chloride Hexahydrate + -    24 Fragrance Mix II  - -    25 Compositae Mix - -    26 Benzoyl Peroxide - -    27 Bacitracin - -    28 Formaldehyde - -    29 Methylchloroisothiazolinone / Methylisothiazolinone - -    30 Corticosteroid Mix - -    31 Sodium Lauryl Sulfate - -    32 Lanolin Alcohol - -    33 Turpentine - -    34 Cetylstearylalcohol - -    35 Chlorhexidine Dicluconate - -    36 Budenoside - -    37 Imidazolidinyl Urea  - -    38 Ethyl-2 Cyanoacrylate - -    39 Quaternium 15 (Dowicil 200) - -    40 Decyl Glucoside - -      PRESERVATIVES & ANTIMICROBIALS         No Substance 2 days 4 days remarks   41  1,2-Benzisothiazoline-3-One, Sodium Salt - -    42  1,3,5-Antwan (2-Hydroxyethyl) - Hexahydrotriazine (Grotan BK) - -    43 7-Vkuybmjiuofrd-9-Nitro-1, 3-Propanediol - -    44  3, 4, 4' - Triclocarban - -    45 4 - Chloro - 3 -  Cresol - -    46 4 - Chloro - 4 - Xylenol (PCMX) - -    47 7-Ethylbicyclooxazolidine (iCurrentan PX4458) - -    48 Benzalkonium Chloride - -    49 Benzyl Alcohol - -    50 Cetalkonium Chloride - -    51 Cetylpyrimidine Chloride  - -    NA Chloroacetamide NA NA    52 DMDM Hydantoin - -    53 Glutaraldehyde - -    54 Triclosan - -    55 Glyoxal Trimeric Dihydrate (+) + 7mm Erosion   56 Iodopropynyl Butylcarbamate - -    57 Octylisothiazoline - -    58 Iodoform - -    59 (Nitrobutyl) Morpholine/(Ethylnitro-Trimethylene) Dimorpholine (iCurrentan P 1487) - -    60 Phenoxyethanol - -    61 Phenyl Salicylate - -    62 Povidone Iodine - -    63 Sodium Benzoate - -    64 Sodium Disulfite - -    65 Sorbic Acid - -    66 Thimerosal - -     Parabens      67 Butyl-P-Hydroxybenzoate - -    68 Ethyl-P-Hydroxybenzoate - -    69 Methyl-P-Hydroxybenzoate - -    70 Propyl-P-Hydroxybenzoate - -      EMULSIFIERS & ADDITIVES        No Substance 2 days 4 days remarks   71 Polyethylene Glycol-400 (+) -    72 Cocamidopropyl Betaine - -    73 Amerchol L101 - -    74 Propylene Glycol - -    75 Triethanolamine - -    76 Sorbitane Sesquiolate - -    77 Isopropylmyristate (+) -    78 Polysorbate 80  - -    79 Amidoamine   (Stearamidopropyl Dimethylamine) - -    80 Oleamidopropyl Dimethylamine - -    81 Lauryl Glucoside - -    82 Coconut Diethanolamide  - -    83 2-Hydroxy-4-Methoxy Benzophenone (Oxybenzone) - -    84 Benzophenone-4 (Sulisobenzon) - -    85 Propolis - -    86 Dexpanthenol - -    87 Carboxymethyl Cellulose Sodium - -    88 Abitol - -    89 Tert-Butylhydroquinone - -    90 Benzyl Salicylate - -     Antioxidant      91 Dodecyl Gallate - -    92 Butylhydroxyanisole (BHA) - -    93 Butylhydroxytoluene (BHT) - -    94 Di-Alpha-Tocopherol (Vit E) - -    95 Propyl Gallate - -      CORTICOSTEROIDS    No Substance 2 days 4 days remarks Allergy  class   96 Amcinonide - -  B   97 Betametasone-17,21 Dipropionate - -  D1   98 Desoximetasone - -  C    99 Betamethasone-17-Valerate - -  D1   100 Dexamethasone - -  C   101 Hydrocortisone - -  A   102 Clobetasol-17-Propionate - -  D1   103 Dexamethasone-21-Phosphate Disodium Salt - -  C   104 Hydrocortisone-17 Butyrate - -  D2   105 Prednisolone - -  A   106 Mometason Furoate - -  D1   107 Triamcinolone Acetonide - -  B   108 Methylprednisolone Aceponate - -  D2   109 Hydrocortisone-21-Acetate - -  A   110 Prednicarbate - -  D2       Group Characteristics of group Generic name Name  cross reactions   A Hydrocortisone   Cloprednole, Fludrocortisone acétate, Hydrocortison acetate, Methylprednisolone, Prednisolone, Tixocortolpivalate Alfacortone, Fucidin H, Dermacalm, Hexacortone, Premandole, Imacort With group D2   B Triamcinolone-acetonide   Budenoside (R-isomer), Amcinonide, Desonide, Fluocinolone acetonide, Triamcinolone acetonide Locapred, Locatop  Synalar, Pevisone, Kenacort -   C Betamethasone (Without Romina)   Betamethasone, Dexamethasone, Flumethasone pivalate, Halomethasone Daivobet, Dexasalyl, Locasalen,   -   D1 Betamethasone-diproprionate   Betamethasone dipropionate, Betamethasone-17-valerate, Clobetasole-propionate, Fluticasone propionate, Mometasone furoate Betnovate, Diprogenta, Diprosalic, Diprosone, Celestoderm, Fucicort,  Cutivate, Axotide, Elocom -   D2 Methylprednisolone-aceponate   Hydrocortisone-aceponate, Hydrocortisone-buteprate, Hydrocortisone-17-butyrate, Methylprednisolone aceponate, Prednicarbate Locoïd, Advantan,  Prednitop With group A and Budesonide (S-isomer)     Results of patch tests:                         Interpretation:    - Negative                    A    = Allergic      (+) Erythema    TI   = Toxic/irritant   + E + Infiltration    RaP = Relevance at Present     ++ E/I + Papulovesicle   Rpr  = Relevance Previously     +++ E/I/P + Blister     nR   = No Relevance    Atopy Screen (07/08/2019)    No Substance Readings (15min) Evaluation   POS Histamine 1mg/ml ++    NEG NaCl 0.9% -       No Substance Readings (15min) Evaluation   1 Alternaria alternata (tenuis)  -    2 Cladosporium herbarum -    3 Aspergillus fumigatus -    4 Penicillium notatum -    5 Dermatophagoides pteronyssinus +++    6 Dermatophagoides farinae -    7 Dog epithelium (canis spp) -    8 Cat hair (steve catus) -    9 Cockroach   (Blatella americana & germanica) -    10 Grass mix midwest   (Noris, Orchard, Redtop, Brayden) -    11  grass (sorghum halepense) -    12 Weed mix   (common Cocklebur, Lamb s quarters, rough redroot Pigweed, Dock/Sorrel) -    13 Mugwort (artemisia vulgare) -    14 Ragweed giant/short (ambrosia spp) -    15 English Plantain (plantago lanceolata) -    16 Tree mix 1 (Pecan, Maple BHR, Oak RVW, american [place], black [place]) NA    17 Red cedar (juniperus virginia) -    18 Tree mix 2   (white Dirk, river/red Birch, black Quinnesec, common Earp, american Elm) NA    19 Box elder/Maple mix (acer spp) -    20 Scurry shagbark (carya ovata) -       -    Conclusions: Patient is atopic with clear immediate type reaction to D. Pteronyssinus (house dust mite). Patient should observe the molds and house dust mite areas and report back with photos if any delayed reaction there       [x] Allergic reaction diagnosed against: against metals (Cobalt, Nickel and chromate) and as well angry back --> could go well with atopic dermatitis   ==> the reactions are all erosions and not typical allergic, infiltrated lesions.     Interpretation/ remarks:   First of all, after removal patient has angry back (see photo). Based on that the results are difficult to obtain. However, all the reactions were well defined erosions without infiltration and therefore rather irritant reactions with underlying atopic dermatitis than real allergic reactions.  The only real reaction seems to be to the metals, particularly Nickel, which would go well with atopic dermatitis         Impression/Plan:    >> Severe exantematic, generalized  Atopic Dermatitis: Atopy skin prick test on 7/8/2019 with clear immediate type reaction to D. Pteronyssinus (house dust mite).     Has been on Dupixent since 10/17/18. Initially 600mg subcutaneously, currently 300mg every other week.     Could consider patch test in the future given previous patch test (10/2018) was completed during an acute eczematous phase, so results were not clear on parabens and Methyldibromo Glutaronitrile.     [facility] to check CBC, kidney, liver, and inflammatory panels every couple months.    Use gentle free and clear soaps and shampoos  ==> atopic predisposition with clinically relevant sensitization to house dust mites (D. Pteronyssinus) with Rhinitis entire day = given infos for house dust mite reduction    PLAN      Continue with every 2 weeks Dupixent (controls the Exanthema, but not totally gone) .--> As of 7/20/20 HAVE to continue every 2 weeks dupixent injection at 300 mg every 14 days for at least another six months (important to keep that frequency because patient is controlled but still produces flare-ups at the end of 2 weeks). Unfortunately would want to reduce to every 3 weeks, but again now having return of symptoms prior to two week interval already.    [facility] wants for the moment the patient to be followed up by our clinic ([facility])    Continue using topical triamcinolone 0.01% ointment about 2x weekly as necessary    Prescribed oral prednisone taper to be taken PRN for severe flare-ups. The taper, if necessary, would be 50mg to start and everyday 10mg reduction daily, to take in the morning over x5 days    Continue Vanicream Cream. Put on entire skin 2-3 times per day. Pt has very dry skin and might use one jar every 1-2 weeks. Refills provided today.    Return to clinic or [facility]-preferred dermatologist if this persists or return in December for intradermal test for dust mites and molds with photos.       2. Seborrheic Dermatitis   -  Start ketoconazole 2% shampoo - apply three times a week     Follow-up in 6 months unless the [facility] prefers another dermatologist, then follow up with the [facility]'s preferred dermatologist.    Forward a copy of the notes to the patient's PCP in the [facility].    I spent a total of 30 minutes face to face with Mike Naidu during today s office visit. Over 50% of this time was spent counseling the patient and/or coordinating care. Please see Assessment and Plan for details.    Staff involved:  - Soniya Ceballos LPN        Again, thank you for allowing me to participate in the care of your patient.        Sincerely,        Leon Lanier MD

## 2021-01-06 ENCOUNTER — VIRTUAL VISIT (OUTPATIENT)
Dept: ALLERGY | Facility: CLINIC | Age: 71
End: 2021-01-06
Payer: COMMERCIAL

## 2021-01-06 DIAGNOSIS — L20.89 OTHER ATOPIC DERMATITIS: ICD-10-CM

## 2021-01-06 DIAGNOSIS — L20.9 ATOPIC DERMATITIS, UNSPECIFIED TYPE: ICD-10-CM

## 2021-01-06 DIAGNOSIS — L20.81 ATOPIC NEURODERMATITIS: ICD-10-CM

## 2021-01-06 DIAGNOSIS — L21.9 DERMATITIS, SEBORRHEIC: ICD-10-CM

## 2021-01-06 PROCEDURE — 99213 OFFICE O/P EST LOW 20 MIN: CPT | Performed by: DERMATOLOGY

## 2021-01-06 RX ORDER — DUPILUMAB 300 MG/2ML
300 INJECTION, SOLUTION SUBCUTANEOUS
Qty: 24 ML | Refills: 0 | Status: SHIPPED | OUTPATIENT
Start: 2021-01-06 | End: 2021-01-22

## 2021-01-06 RX ORDER — KETOCONAZOLE 20 MG/ML
SHAMPOO TOPICAL
Qty: 120 ML | Refills: 3 | Status: SHIPPED | OUTPATIENT
Start: 2021-01-06 | End: 2022-07-29

## 2021-01-06 RX ORDER — TRIAMCINOLONE ACETONIDE 1 MG/G
OINTMENT TOPICAL
Qty: 80 G | Refills: 6 | Status: SHIPPED | OUTPATIENT
Start: 2021-01-06 | End: 2021-06-08

## 2021-01-06 RX ORDER — PREDNISONE 20 MG/1
TABLET ORAL
Qty: 22 TABLET | Refills: 0 | Status: SHIPPED | OUTPATIENT
Start: 2021-01-06 | End: 2022-07-29

## 2021-01-06 RX ORDER — EMOLLIENT BASE
CREAM (GRAM) TOPICAL 3 TIMES DAILY
Qty: 453 G | Refills: 10 | Status: SHIPPED | OUTPATIENT
Start: 2021-01-06 | End: 2022-07-29

## 2021-01-06 NOTE — LETTER
1/6/2021         RE: Mike Naidu  [id number] [address]        Dear Colleague,    Thank you for referring your patient, Mike Naidu, to the [facility]. Please see a copy of my visit note below.    Mike is a 70 year old male who is being evaluated via a billable video visit.      How would you like to obtain your AVS? MyChart  Will anyone else be joining your video visit? No      Video-Visit Details    Type of service:  Video Visit    Originating Location (pt. Location): Home    Distant Location (provider location):  [facility]     Platform used for Video Visit: [employer]    Note for return visit for Dermato-Allergy       Encounter Date: Jan 6, 2021    History of Present Illness:  I have reviewed the teledermatology  information and the nursing intake corresponding to this issue. Mike Naidu is a 70 year old male who presents as a teledermatology consult for the following information take directly from the prior notes and phone call performed by myself:     Patient on Dupixent since October 2018 = 2 years and has on and off attacks of eczemas on the entire body.  Problems start usually few days before next injection of Dupixent and therefore patient still needs the Dupixent.    Did not use the oral prednisone anymore (last time in August 2020)      Additional comments and observations from review of the patient s chart including the following:    See notes    ROS: Patient generally feeling well today   Physical Examination:  General: Well-appearing, appropriately-developed individual.  - Skin: Examination of the skin according to patients informations within the teledermatology was performed.   In the moment no active eczematous lesions. Scalp lesions with ketoconazole under control.  As above in HPI. No additional skin concerns.  - upper respiratory tract: currently no obvious Rhinitis  - lungs: no  signs for active and present Asthma/Wheezing or coughing  - eyes: currently no active conjunctivits  - GI system/digestion: currently no problems, no bloating or Diarrhoea      Allergy Problem List:  1. Atopic dermatitis: atopy screen positive to D. Pteronyssinus (house dust mite) - 7/8/2019  -Currently improving. S/p dupixent initiation 10/17/18. Initial concern for drug rash after starting Dupixent, but more consistent with atopic dermatitis flare.    Continue:  - Vanicream over entire body 2-3 times/day  - Dupixent 300mg every other week  - Triamcinolone 0.1% cream BID to eczematous patches 2 times a day for 2 weeks.   - Free and clear shampoos and soaps    2. Seborrheic Dermatitis (scalp dermatitis may have been more likely to be due to atopic dermatitis)  - Ketoconazole shampoo       Previous History of Present Illness:    Patient has been on Dupixent since October 2018 and has dramatic improvement in his skin. Patient continues to report intermittent pruritus, mostly on his bilateral arms, back, and stomach. He receives dupixent every other week (next dose due 7/10). He applies Vanicream at least daily, sometimes more frequently to affected areas. He only applies triamcinolone 0.1% cream to the affected areas as needed.       7/20/20: still with occasional erythematous breakouts, mainly around abdomen and trunk, quite pruritic. Still happening every 3 or 4 days, but before dupixent was far more frequent for 4 years.  Feels the dupixent is definitely helping significantly. Also has pruritis when getting out of the hot shower, and so he limits these. Has not had any triggers he notices. Vanicream helps with breakouts. After dupixent injections, gets briefly inflamed, then at the end of the two weeks dermatitis begins to reappear. Thus, still having slight increase of dermatitis at the end of the two weeks.    Hx since 7/8/19v    Today (10/4/19) the pt comes in today for followup. He reports that he is breaking  out. This flare up started 2.5 weeks ago without obvious source. Gaetano thinks this started improving last night. He currently has a widespread red, pruritic rash He continues the Dupixent injection every 2 weeks with his last injection yesterday. He is no longer taking the Zyrtec.    Hx since 10/4/19:  The patient comes in today (11/29/19) for followup. He reports that he was unable to obtain the medications prescribed at the last visit, notably the prednisone burst.  He states that he has been taking dupixent every 2 weeks and gets flare-ups that are intermittent. States that the symptoms subside after dupixent injection for a time but then he will still get flare-ups.  States that he has not used the triamcinolone cream in the interim due to it being a steroid.   No side effects from the dupixent.  The patient is otherwise feeling well today. There are no other allergy concerns at this time.      Past Medical History:   - No history of eczema in childhood. No history of asthma.     No past surgical history on file.    Social History:  Patient reports that he has never smoked. He has never used smokeless tobacco. 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     Family History:  Atopic dermatitis in 2 of his grandchildren.     Medications:  Current Outpatient Medications   Medication Sig Dispense Refill     atorvastatin (LIPITOR) 80 MG tablet        cetirizine (ZYRTEC) 10 MG tablet        doxepin (SINEQUAN) 50 MG capsule        Dupilumab (DUPIXENT) 300 MG/2ML syringe Inject 2 mLs (300 mg) Subcutaneous every 14 days 24 mL 0     Dupilumab (DUPIXENT) 300 MG/2ML syringe Inject 2 mLs (300 mg) Subcutaneous every 14 days 2 mL 3     emollient (VANICREAM) external cream Apply topically 3 times daily Apply to entire body three times a day to help maintain skin barrier. 453 g 3     emollient (VANICREAM) external cream Put on entire skin 2-3 times per day. Very dry skin and might use  one jar every 1-2 weeks. 453 g 8     FLOMAX 0.4 MG capsule        folic acid (FOLVITE) 1 MG tablet        gabapentin (NEURONTIN) 300 MG capsule        hydrOXYzine (ATARAX) 25 MG tablet        ketoconazole (NIZORAL) 2 % external shampoo Wash hair every other day 120 mL 1     pantoprazole (PROTONIX) 40 MG EC tablet        predniSONE (DELTASONE) 20 MG tablet Start at 50mg (today and tomorrow morning) and then reduce by 5mg every day ==> therefore after 11 days stop Prednisone  Please provide patient with enough 20mg and 5mg Tabl 22 tablet 0     predniSONE (DELTASONE) 5 MG tablet Take 2 tablets (10 mg) by mouth daily 15 tablet 0     RANITIDINE 150 MAX STRENGTH 150 MG tablet        triamcinolone (KENALOG) 0.1 % cream        triamcinolone (KENALOG) 0.1 % external ointment If flare ups, use for 3-4 days on itchy, eczematous lesions 1-2 times daily. Otherwise use 2xweekly on remaining lesions 80 g 6     Allergies   Allergen Reactions     Procaine Swelling     Sulfa Drugs Swelling and Itching     swelling     Valproic Acid Itching and Rash     hives         Review of Systems:  -Constitutional: Otherwise feeling well today, in usual state of health.  -Skin: As above in HPI. No additional skin concerns.    Physical exam:  Vitals: There were no vitals taken for this visit.  GEN: This is a well developed, well-nourished male in no acute distress, in a pleasant mood.    SKIN: Focused examination of the hands, arms, legs, back, abdomen, and face was performed.  - over abdominal and flank and gluteal area erythema and slight infiltration and as well inner part of tights.  - No clear extensive excoriations  However, remarkable improvement still      Previous Patch Testing for Reference:    Order for PATCH TESTS    [] Outpatient  [] Inpatient: [facility]..../ Bed ....      Skin Atopy (atopic dermatitis) [x] Yes   [] No  Rhinitis/Sinusitis:   [x] Yes   [] No  Allergic Asthma:   [] Yes   [x] No  Food Allergy:   [] Yes   [x] No  Leg  ulcers:   [] Yes   [x] No  Hand eczema:   [x] Yes   [] No   Leading hand:   [x] R   [] L       [] Ambidextrous                        Reason for tests (suspected allergy): generalized subacute to chronic eczema with spongiosis and peripheral and skin Eosinophilia  Known previous allergies: Sulphonamides, Valproic acid and Procaine    Standardized panels  [x] Standard panel (40 tests)  [x] Preservatives & Antimicrobials (31 tests)  [x] Emulsifiers & Additives (25 tests)   [] Perfumes/Flavours & Plants (25 tests)  [] Hairdresser panel (12 tests)  [] Rubber Chemicals (22 tests)  [] Plastics (26 tests)  [] Colorants/Dyes/Food additives (20 tests)  [] Metals (implants/dental) (23 tests)  [] Local anaesthetics/NSAIDs (12 tests)  [] Antibiotics & Antimycotics (14 tests)   [x] Corticosteroids (15 tests)   [] Photopatch test (32 tests)   [] others: ...      [] Patient's own products: ...    DO NOT test if chemical or biological identity is unknown!     always ask from patient the product information and safety sheets (MSDS)     [] Patient needs consultation with Allergy team (changes of tests may apply)  [x] Tests discussed with Allergy team (can have direct appointment for patch tests)    RESULTS & EVALUATION of PATCH TESTS    Date/time of application:  Physician/Nurse:  / Felicia Ceballos LPN               Localization of application: Back --> on lower back chronic eczema, but upper back can be used for patch tests. Rather dry there, but no acute eczema    Patch test readings after     [x] 2 days, [] 3 days [x] 4 days, [] 5 days,   Other duration: ...    Applied patch tests with results (import here the list of patch tests):  Date/time of application:10/01/18   Physician/Nurse:  / Felicia Ceballos LPN               Localization of application: Back     STANDARD Series         No Substance 2 days 4 days remarks   1 Jhony Mix [C] - -    2 Colophony - -    3  2-Mercaptobenzothiazole  - -     4  Methylisothiazolinone - -    5 Carba Mix - -    6 Thiurma Mix [A] - -    7 Bisphenol A Epoxy Resin - -    8 J-Lwmj-Ofydzwehwvy-Formaldehyde Resin - -    9 Mercapto Mix [A] - -    10 Black Rubber Mix- PPD [B] - -    11 Potassium Dichromate  + +/++ 5mm erosion   12 Balsam of [place] (Myroxylon Pereirae Resin) - -    13 Nickel Sulphate Hexahydrate +/++ ++ 14mm erosion   14 Mixed Dialkyl Thiourea - -    15 Paraben Mix [B] (+) + 4mm erosion   16 Methyldibromo Glutaronitrile - + 4mm erosion   17 Fragrance Mix - -    18 2-Bromo-2-Nitropropane-1,3-Diol (Bronopol) - -    19 Lyral - -    20 Tixocortol-21- Pivalate - -    21 Diazolidiyl Urea (Germall II) - -    22 Methyl Methacrylate - -    23 Cobalt (II) Chloride Hexahydrate + -    24 Fragrance Mix II  - -    25 Compositae Mix - -    26 Benzoyl Peroxide - -    27 Bacitracin - -    28 Formaldehyde - -    29 Methylchloroisothiazolinone / Methylisothiazolinone - -    30 Corticosteroid Mix - -    31 Sodium Lauryl Sulfate - -    32 Lanolin Alcohol - -    33 Turpentine - -    34 Cetylstearylalcohol - -    35 Chlorhexidine Dicluconate - -    36 Budenoside - -    37 Imidazolidinyl Urea  - -    38 Ethyl-2 Cyanoacrylate - -    39 Quaternium 15 (Dowicil 200) - -    40 Decyl Glucoside - -      PRESERVATIVES & ANTIMICROBIALS         No Substance 2 days 4 days remarks   41  1,2-Benzisothiazoline-3-One, Sodium Salt - -    42  1,3,5-Antwan (2-Hydroxyethyl) - Hexahydrotriazine (Grotan BK) - -    43 8-Qrkvhezqdigak-6-Nitro-1, 3-Propanediol - -    44  3, 4, 4' - Triclocarban - -    45 4 - Chloro - 3 - Cresol - -    46 4 - Chloro - 4 - Xylenol (PCMX) - -    47 7-Ethylbicyclooxazolidine (Bioban [id number]) - -    48 Benzalkonium Chloride - -    49 Benzyl Alcohol - -    50 Cetalkonium Chloride - -    51 Cetylpyrimidine Chloride  - -    NA Chloroacetamide NA NA    52 DMDM Hydantoin - -    53 Glutaraldehyde - -    54 Triclosan - -    55 Glyoxal Trimeric Dihydrate (+) + 7mm Erosion   56 Iodopropynyl  Butylcarbamate - -    57 Octylisothiazoline - -    58 Iodoform - -    59 (Nitrobutyl) Morpholine/(Ethylnitro-Trimethylene) Dimorpholine (Bioban P 1487) - -    60 Phenoxyethanol - -    61 Phenyl Salicylate - -    62 Povidone Iodine - -    63 Sodium Benzoate - -    64 Sodium Disulfite - -    65 Sorbic Acid - -    66 Thimerosal - -     Parabens      67 Butyl-P-Hydroxybenzoate - -    68 Ethyl-P-Hydroxybenzoate - -    69 Methyl-P-Hydroxybenzoate - -    70 Propyl-P-Hydroxybenzoate - -      EMULSIFIERS & ADDITIVES        No Substance 2 days 4 days remarks   71 Polyethylene Glycol-400 (+) -    72 Cocamidopropyl Betaine - -    73 Amerchol L101 - -    74 Propylene Glycol - -    75 Triethanolamine - -    76 Sorbitane Sesquiolate - -    77 Isopropylmyristate (+) -    78 Polysorbate 80  - -    79 Amidoamine   (Stearamidopropyl Dimethylamine) - -    80 Oleamidopropyl Dimethylamine - -    81 Lauryl Glucoside - -    82 Coconut Diethanolamide  - -    83 2-Hydroxy-4-Methoxy Benzophenone (Oxybenzone) - -    84 Benzophenone-4 (Sulisobenzon) - -    85 Propolis - -    86 Dexpanthenol - -    87 Carboxymethyl Cellulose Sodium - -    88 Abitol - -    89 Tert-Butylhydroquinone - -    90 Benzyl Salicylate - -     Antioxidant      91 Dodecyl Gallate - -    92 Butylhydroxyanisole (BHA) - -    93 Butylhydroxytoluene (BHT) - -    94 Di-Alpha-Tocopherol (Vit E) - -    95 Propyl Gallate - -      CORTICOSTEROIDS    No Substance 2 days 4 days remarks Allergy  class   96 Amcinonide - -  B   97 Betametasone-17,21 Dipropionate - -  D1   98 Desoximetasone - -  C   99 Betamethasone-17-Valerate - -  D1   100 Dexamethasone - -  C   101 Hydrocortisone - -  A   102 Clobetasol-17-Propionate - -  D1   103 Dexamethasone-21-Phosphate Disodium Salt - -  C   104 Hydrocortisone-17 Butyrate - -  D2   105 Prednisolone - -  A   106 Mometason Furoate - -  D1   107 Triamcinolone Acetonide - -  B   108 Methylprednisolone Aceponate - -  D2   109 Hydrocortisone-21-Acetate -  -  A   110 Prednicarbate - -  D2       Group Characteristics of group Generic name Name  cross reactions   A Hydrocortisone   Cloprednole, Fludrocortisone acétate, Hydrocortison acetate, Methylprednisolone, Prednisolone, Tixocortolpivalate Alfacortone, Fucidin H, Dermacalm, Hexacortone, Premandole, Imacort With group D2   B Triamcinolone-acetonide   Budenoside (R-isomer), Amcinonide, Desonide, Fluocinolone acetonide, Triamcinolone acetonide Locapred, Locatop  Synalar, Pevisone, Kenacort -   C Betamethasone (Without Romina)   Betamethasone, Dexamethasone, Flumethasone pivalate, Halomethasone Daivobet, Dexasalyl, Locasalen,   -   D1 Betamethasone-diproprionate   Betamethasone dipropionate, Betamethasone-17-valerate, Clobetasole-propionate, Fluticasone propionate, Mometasone furoate Betnovate, Diprogenta, Diprosalic, Diprosone, Celestoderm, Fucicort,  Cutivate, Axotide, Elocom -   D2 Methylprednisolone-aceponate   Hydrocortisone-aceponate, Hydrocortisone-buteprate, Hydrocortisone-17-butyrate, Methylprednisolone aceponate, Prednicarbate Locoïd, Advantan,  Prednitop With group A and Budesonide (S-isomer)     Results of patch tests:                         Interpretation:    - Negative                    A    = Allergic      (+) Erythema    TI   = Toxic/irritant   + E + Infiltration    RaP = Relevance at Present     ++ E/I + Papulovesicle   Rpr  = Relevance Previously     +++ E/I/P + Blister     nR   = No Relevance    Atopy Screen (07/08/2019)    No Substance Readings (15min) Evaluation   POS Histamine 1mg/ml ++    NEG NaCl 0.9% -      No Substance Readings (15min) Evaluation   1 Alternaria alternata (tenuis)  -    2 Cladosporium herbarum -    3 Aspergillus fumigatus -    4 Penicillium notatum -    5 Dermatophagoides pteronyssinus +++    6 Dermatophagoides farinae -    7 Dog epithelium (canis spp) -    8 Cat hair (steve catus) -    9 Cockroach   (Blatella americana & germanica) -    10 Grass mix North English   (Noris, Orchard,  Redtop, Brayden) -    11  grass (sorghum halepense) -    12 Weed mix   (common Cocklebur, Lamb s quarters, rough redroot Pigweed, Dock/Sorrel) -    13 Mugwort (artemisia vulgare) -    14 Ragweed giant/short (ambrosia spp) -    15 English Plantain (plantago lanceolata) -    16 Tree mix 1 (Pecan, Maple BHR, Oak RVW, american Terrell, black Brewster) NA    17 Red cedar (juniperus virginia) -    18 Tree mix 2   (white Dirk, river/red Birch, black Daggett, common Benton, american Elm) NA    19 Box elder/Maple mix (acer spp) -    20 Lauderdale shagbark (carya ovata) -       -    Conclusions: Patient is atopic with clear immediate type reaction to D. Pteronyssinus (house dust mite). Patient should observe the molds and house dust mite areas and report back with photos if any delayed reaction there       [x] Allergic reaction diagnosed against: against metals (Cobalt, Nickel and chromate) and as well angry back --> could go well with atopic dermatitis   ==> the reactions are all erosions and not typical allergic, infiltrated lesions.     Interpretation/ remarks:   First of all, after removal patient has angry back (see photo). Based on that the results are difficult to obtain. However, all the reactions were well defined erosions without infiltration and therefore rather irritant reactions with underlying atopic dermatitis than real allergic reactions.  The only real reaction seems to be to the metals, particularly Nickel, which would go well with atopic dermatitis         Impression/Plan:    >> Severe exantematic, generalized Atopic Dermatitis: Atopy skin prick test on 7/8/2019 with clear immediate type reaction to D. Pteronyssinus (house dust mite).     Has been on Dupixent since 10/17/18. Initially 600mg subcutaneously, currently 300mg every other week.     Could consider patch test in the future given previous patch test (10/2018) was completed during an acute eczematous phase, so results were not clear on parabens  and Methyldibromo Glutaronitrile.     [facility] to check CBC, kidney, liver, and inflammatory panels every couple months.    Use gentle free and clear soaps and shampoos  ==> atopic predisposition with clinically relevant sensitization to house dust mites (D. Pteronyssinus) with Rhinitis entire day = given infos for house dust mite reduction    PLAN      Continue with every 2 weeks Dupixent (controls the Exanthema, but not totally gone) .--> As of 7/20/20 HAVE to continue every 2 weeks dupixent injection at 300 mg every 14 days for at least another six months (important to keep that frequency because patient is controlled but still produces flare-ups at the end of 2 weeks). Cannot reduce to every 3 weeks.     [facility] wants for the moment the patient to be followed up by our clinic (contacte the [facility] for [facility])    Continue using topical triamcinolone 0.01% ointment about 2x weekly as necessary or if flare ups use it for about 3-4 days daily    Continue Vanicream Cream. Put on entire skin 2-3 times per day. Pt has very dry skin and might use one jar every 1-2 weeks. Refills provided today.    Prescribed for 10 days oral prednisone, just for the case he would need it = reserve    Return to clinic or [facility]-preferred dermatologist if this persists or return in December for intradermal test for dust mites and molds with photos.       2. Seborrheic Dermatitis   - Start ketoconazole 2% shampoo - apply three times a week     Follow-up in 6 months unless the [facility] prefers another dermatologist, then follow up with the [facility]'s preferred dermatologist.    Forward a copy of the notes to the patient's PCP in the [facility].      Thank you for the opportunity be involved in the care of this patient.     Staff: Felicia Ceballos LPN     Follow up in about 6-7 months    _____________________________________________________________________________    Teledermatology information:  - Location of patient: Home  - Patient presented in  referral from:  other  - Location of teledermatologist:  [facility] [place] (, [facility], [place])  - Reason teledermatology is appropriate:  of National Emergency Regarding Coronavirus disease (COVID 19) Outbreak  - Method of transmission:  by [employer] phone  - Date of images: na  - Service start time:10:06am  - Service end time:10:22am  - Date of report: 01/06/21      I spent a total of 16 minutes for telemedicine consult with Mike Naidu during today s phone meeting. Over 50% of this time was spent counseling the patient and/or coordinating care. Please see Assessment and Plan for details.       Again, thank you for allowing me to participate in the care of your patient.        Sincerely,        Leon Lanier MD

## 2021-01-06 NOTE — PROGRESS NOTES
Mike is a 70 year old male who is being evaluated via a billable video visit.      How would you like to obtain your AVS? MyChart  Will anyone else be joining your video visit? No      Video-Visit Details    Type of service:  Video Visit    Originating Location (pt. Location): Home    Distant Location (provider location):  [facility] [place]     Platform used for Video Visit: [employer]

## 2021-01-06 NOTE — PROGRESS NOTES
Note for return visit for Dermato-Allergy       Encounter Date: Jan 6, 2021    History of Present Illness:  I have reviewed the teledermatology  information and the nursing intake corresponding to this issue. Mike Naidu is a 70 year old male who presents as a teledermatology consult for the following information take directly from the prior notes and phone call performed by myself:     Patient on Dupixent since October 2018 = 2 years and has on and off attacks of eczemas on the entire body.  Problems start usually few days before next injection of Dupixent and therefore patient still needs the Dupixent.    Did not use the oral prednisone anymore (last time in August 2020)      Additional comments and observations from review of the patient s chart including the following:    See notes    ROS: Patient generally feeling well today   Physical Examination:  General: Well-appearing, appropriately-developed individual.  - Skin: Examination of the skin according to patients informations within the teledermatology was performed.   In the moment no active eczematous lesions. Scalp lesions with ketoconazole under control.  As above in HPI. No additional skin concerns.  - upper respiratory tract: currently no obvious Rhinitis  - lungs: no signs for active and present Asthma/Wheezing or coughing  - eyes: currently no active conjunctivits  - GI system/digestion: currently no problems, no bloating or Diarrhoea      Allergy Problem List:  1. Atopic dermatitis: atopy screen positive to D. Pteronyssinus (house dust mite) - 7/8/2019  -Currently improving. S/p dupixent initiation 10/17/18. Initial concern for drug rash after starting Dupixent, but more consistent with atopic dermatitis flare.    Continue:  - Vanicream over entire body 2-3 times/day  - Dupixent 300mg every other week  - Triamcinolone 0.1% cream BID to eczematous patches 2 times a day for 2 weeks.   - Free and clear shampoos and soaps    2. Seborrheic  Dermatitis (scalp dermatitis may have been more likely to be due to atopic dermatitis)  - Ketoconazole shampoo       Previous History of Present Illness:    Patient has been on Dupixent since October 2018 and has dramatic improvement in his skin. Patient continues to report intermittent pruritus, mostly on his bilateral arms, back, and stomach. He receives dupixent every other week (next dose due 7/10). He applies Vanicream at least daily, sometimes more frequently to affected areas. He only applies triamcinolone 0.1% cream to the affected areas as needed.       7/20/20: still with occasional erythematous breakouts, mainly around abdomen and trunk, quite pruritic. Still happening every 3 or 4 days, but before dupixent was far more frequent for 4 years.  Feels the dupixent is definitely helping significantly. Also has pruritis when getting out of the hot shower, and so he limits these. Has not had any triggers he notices. Vanicream helps with breakouts. After dupixent injections, gets briefly inflamed, then at the end of the two weeks dermatitis begins to reappear. Thus, still having slight increase of dermatitis at the end of the two weeks.    Hx since 7/8/19v    Today (10/4/19) the pt comes in today for followup. He reports that he is breaking out. This flare up started 2.5 weeks ago without obvious source. Gaetano thinks this started improving last night. He currently has a widespread red, pruritic rash He continues the Dupixent injection every 2 weeks with his last injection yesterday. He is no longer taking the Zyrtec.    Hx since 10/4/19:  The patient comes in today (11/29/19) for followup. He reports that he was unable to obtain the medications prescribed at the last visit, notably the prednisone burst.  He states that he has been taking dupixent every 2 weeks and gets flare-ups that are intermittent. States that the symptoms subside after dupixent injection for a time but then he will still get  flare-ups.  States that he has not used the triamcinolone cream in the interim due to it being a steroid.   No side effects from the dupixent.  The patient is otherwise feeling well today. There are no other allergy concerns at this time.      Past Medical History:   - No history of eczema in childhood. No history of asthma.     No past surgical history on file.    Social History:  Patient reports that he has never smoked. He has never used smokeless tobacco. 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     Family History:  Atopic dermatitis in 2 of his grandchildren.     Medications:  Current Outpatient Medications   Medication Sig Dispense Refill     atorvastatin (LIPITOR) 80 MG tablet        cetirizine (ZYRTEC) 10 MG tablet        doxepin (SINEQUAN) 50 MG capsule        Dupilumab (DUPIXENT) 300 MG/2ML syringe Inject 2 mLs (300 mg) Subcutaneous every 14 days 24 mL 0     Dupilumab (DUPIXENT) 300 MG/2ML syringe Inject 2 mLs (300 mg) Subcutaneous every 14 days 2 mL 3     emollient (VANICREAM) external cream Apply topically 3 times daily Apply to entire body three times a day to help maintain skin barrier. 453 g 3     emollient (VANICREAM) external cream Put on entire skin 2-3 times per day. Very dry skin and might use one jar every 1-2 weeks. 453 g 8     FLOMAX 0.4 MG capsule        folic acid (FOLVITE) 1 MG tablet        gabapentin (NEURONTIN) 300 MG capsule        hydrOXYzine (ATARAX) 25 MG tablet        ketoconazole (NIZORAL) 2 % external shampoo Wash hair every other day 120 mL 1     pantoprazole (PROTONIX) 40 MG EC tablet        predniSONE (DELTASONE) 20 MG tablet Start at 50mg (today and tomorrow morning) and then reduce by 5mg every day ==> therefore after 11 days stop Prednisone  Please provide patient with enough 20mg and 5mg Tabl 22 tablet 0     predniSONE (DELTASONE) 5 MG tablet Take 2 tablets (10 mg) by mouth daily 15 tablet 0     RANITIDINE 150 MAX STRENGTH  150 MG tablet        triamcinolone (KENALOG) 0.1 % cream        triamcinolone (KENALOG) 0.1 % external ointment If flare ups, use for 3-4 days on itchy, eczematous lesions 1-2 times daily. Otherwise use 2xweekly on remaining lesions 80 g 6     Allergies   Allergen Reactions     Procaine Swelling     Sulfa Drugs Swelling and Itching     swelling     Valproic Acid Itching and Rash     hives         Review of Systems:  -Constitutional: Otherwise feeling well today, in usual state of health.  -Skin: As above in HPI. No additional skin concerns.    Physical exam:  Vitals: There were no vitals taken for this visit.  GEN: This is a well developed, well-nourished male in no acute distress, in a pleasant mood.    SKIN: Focused examination of the hands, arms, legs, back, abdomen, and face was performed.  - over abdominal and flank and gluteal area erythema and slight infiltration and as well inner part of tights.  - No clear extensive excoriations  However, remarkable improvement still      Previous Patch Testing for Reference:    Order for PATCH TESTS    [] Outpatient  [] Inpatient: [facility]..../ Bed ....      Skin Atopy (atopic dermatitis) [x] Yes   [] No  Rhinitis/Sinusitis:   [x] Yes   [] No  Allergic Asthma:   [] Yes   [x] No  Food Allergy:   [] Yes   [x] No  Leg ulcers:   [] Yes   [x] No  Hand eczema:   [x] Yes   [] No   Leading hand:   [x] R   [] L       [] Ambidextrous                        Reason for tests (suspected allergy): generalized subacute to chronic eczema with spongiosis and peripheral and skin Eosinophilia  Known previous allergies: Sulphonamides, Valproic acid and Procaine    Standardized panels  [x] Standard panel (40 tests)  [x] Preservatives & Antimicrobials (31 tests)  [x] Emulsifiers & Additives (25 tests)   [] Perfumes/Flavours & Plants (25 tests)  [] Hairdresser panel (12 tests)  [] Rubber Chemicals (22 tests)  [] Plastics (26 tests)  [] Colorants/Dyes/Food additives (20 tests)  [] Metals  (implants/dental) (23 tests)  [] Local anaesthetics/NSAIDs (12 tests)  [] Antibiotics & Antimycotics (14 tests)   [x] Corticosteroids (15 tests)   [] Photopatch test (32 tests)   [] others: ...      [] Patient's own products: ...    DO NOT test if chemical or biological identity is unknown!     always ask from patient the product information and safety sheets (MSDS)     [] Patient needs consultation with Allergy team (changes of tests may apply)  [x] Tests discussed with Allergy team (can have direct appointment for patch tests)    RESULTS & EVALUATION of PATCH TESTS    Date/time of application:  Physician/Nurse:  / Felicia Ceballos LPN               Localization of application: Back --> on lower back chronic eczema, but upper back can be used for patch tests. Rather dry there, but no acute eczema    Patch test readings after     [x] 2 days, [] 3 days [x] 4 days, [] 5 days,   Other duration: ...    Applied patch tests with results (import here the list of patch tests):  Date/time of application:10/01/18   Physician/Nurse:  / Felicia Ceballos LPN               Localization of application: Back     STANDARD Series         No Substance 2 days 4 days remarks   1 Jhony Mix [C] - -    2 Colophony - -    3  2-Mercaptobenzothiazole  - -     4 Methylisothiazolinone - -    5 Carba Mix - -    6 Thiurma Mix [A] - -    7 Bisphenol A Epoxy Resin - -    8 B-Qasf-Iyqpkehfujj-Formaldehyde Resin - -    9 Mercapto Mix [A] - -    10 Black Rubber Mix- PPD [B] - -    11 Potassium Dichromate  + +/++ 5mm erosion   12 Balsam of [place] (Myroxylon Pereirae Resin) - -    13 Nickel Sulphate Hexahydrate +/++ ++ 14mm erosion   14 Mixed Dialkyl Thiourea - -    15 Paraben Mix [B] (+) + 4mm erosion   16 Methyldibromo Glutaronitrile - + 4mm erosion   17 Fragrance Mix - -    18 2-Bromo-2-Nitropropane-1,3-Diol (Bronopol) - -    19 Lyral - -    20 Tixocortol-21- Pivalate - -    21 Diazolidiyl Urea (Germall II) - -    22 Methyl  Methacrylate - -    23 Cobalt (II) Chloride Hexahydrate + -    24 Fragrance Mix II  - -    25 Compositae Mix - -    26 Benzoyl Peroxide - -    27 Bacitracin - -    28 Formaldehyde - -    29 Methylchloroisothiazolinone / Methylisothiazolinone - -    30 Corticosteroid Mix - -    31 Sodium Lauryl Sulfate - -    32 Lanolin Alcohol - -    33 Turpentine - -    34 Cetylstearylalcohol - -    35 Chlorhexidine Dicluconate - -    36 Budenoside - -    37 Imidazolidinyl Urea  - -    38 Ethyl-2 Cyanoacrylate - -    39 Quaternium 15 (Dowicil 200) - -    40 Decyl Glucoside - -      PRESERVATIVES & ANTIMICROBIALS         No Substance 2 days 4 days remarks   41  1,2-Benzisothiazoline-3-One, Sodium Salt - -    42  1,3,5-Antwan (2-Hydroxyethyl) - Hexahydrotriazine (Grotan BK) - -    43 7-Yvtjhcmmlvtbt-7-Nitro-1, 3-Propanediol - -    44  3, 4, 4' - Triclocarban - -    45 4 - Chloro - 3 - Cresol - -    46 4 - Chloro - 4 - Xylenol (PCMX) - -    47 7-Ethylbicyclooxazolidine (Bioban RM2548) - -    48 Benzalkonium Chloride - -    49 Benzyl Alcohol - -    50 Cetalkonium Chloride - -    51 Cetylpyrimidine Chloride  - -    NA Chloroacetamide NA NA    52 DMDM Hydantoin - -    53 Glutaraldehyde - -    54 Triclosan - -    55 Glyoxal Trimeric Dihydrate (+) + 7mm Erosion   56 Iodopropynyl Butylcarbamate - -    57 Octylisothiazoline - -    58 Iodoform - -    59 (Nitrobutyl) Morpholine/(Ethylnitro-Trimethylene) Dimorpholine (Bioban P 1487) - -    60 Phenoxyethanol - -    61 Phenyl Salicylate - -    62 Povidone Iodine - -    63 Sodium Benzoate - -    64 Sodium Disulfite - -    65 Sorbic Acid - -    66 Thimerosal - -     Parabens      67 Butyl-P-Hydroxybenzoate - -    68 Ethyl-P-Hydroxybenzoate - -    69 Methyl-P-Hydroxybenzoate - -    70 Propyl-P-Hydroxybenzoate - -      EMULSIFIERS & ADDITIVES        No Substance 2 days 4 days remarks   71 Polyethylene Glycol-400 (+) -    72 Cocamidopropyl Betaine - -    73 Amerchol L101 - -    74 Propylene Glycol - -     75 Triethanolamine - -    76 Sorbitane Sesquiolate - -    77 Isopropylmyristate (+) -    78 Polysorbate 80  - -    79 Amidoamine   (Stearamidopropyl Dimethylamine) - -    80 Oleamidopropyl Dimethylamine - -    81 Lauryl Glucoside - -    82 Coconut Diethanolamide  - -    83 2-Hydroxy-4-Methoxy Benzophenone (Oxybenzone) - -    84 Benzophenone-4 (Sulisobenzon) - -    85 Propolis - -    86 Dexpanthenol - -    87 Carboxymethyl Cellulose Sodium - -    88 Abitol - -    89 Tert-Butylhydroquinone - -    90 Benzyl Salicylate - -     Antioxidant      91 Dodecyl Gallate - -    92 Butylhydroxyanisole (BHA) - -    93 Butylhydroxytoluene (BHT) - -    94 Di-Alpha-Tocopherol (Vit E) - -    95 Propyl Gallate - -      CORTICOSTEROIDS    No Substance 2 days 4 days remarks Allergy  class   96 Amcinonide - -  B   97 Betametasone-17,21 Dipropionate - -  D1   98 Desoximetasone - -  C   99 Betamethasone-17-Valerate - -  D1   100 Dexamethasone - -  C   101 Hydrocortisone - -  A   102 Clobetasol-17-Propionate - -  D1   103 Dexamethasone-21-Phosphate Disodium Salt - -  C   104 Hydrocortisone-17 Butyrate - -  D2   105 Prednisolone - -  A   106 Mometason Furoate - -  D1   107 Triamcinolone Acetonide - -  B   108 Methylprednisolone Aceponate - -  D2   109 Hydrocortisone-21-Acetate - -  A   110 Prednicarbate - -  D2       Group Characteristics of group Generic name Name  cross reactions   A Hydrocortisone   Cloprednole, Fludrocortisone acétate, Hydrocortison acetate, Methylprednisolone, Prednisolone, Tixocortolpivalate Alfacortone, Fucidin H, Dermacalm, Hexacortone, Premandole, Imacort With group D2   B Triamcinolone-acetonide   Budenoside (R-isomer), Amcinonide, Desonide, Fluocinolone acetonide, Triamcinolone acetonide Locapred, Locatop  Synalar, Pevisone, Ken[place]ort -   C Betamethasone (Without Romina)   Betamethasone, Dexamethasone, Flumethasone pivalate, Halomethasone Daivobet, Dexasalyl, Locasalen,   -   D1 Betamethasone-diproprionate    Betamethasone dipropionate, Betamethasone-17-valerate, Clobetasole-propionate, Fluticasone propionate, Mometasone furoate Betnovate, Diprogenta, Diprosalic, Diprosone, Celestoderm, Fucicort,  Cutivate, Axotide, Elocom -   D2 Methylprednisolone-aceponate   Hydrocortisone-aceponate, Hydrocortisone-buteprate, Hydrocortisone-17-butyrate, Methylprednisolone aceponate, Prednicarbate Locoïd, Advantan,  Prednitop With group A and Budesonide (S-isomer)     Results of patch tests:                         Interpretation:    - Negative                    A    = Allergic      (+) Erythema    TI   = Toxic/irritant   + E + Infiltration    RaP = Relevance at Present     ++ E/I + Papulovesicle   Rpr  = Relevance Previously     +++ E/I/P + Blister     nR   = No Relevance    Atopy Screen (07/08/2019)    No Substance Readings (15min) Evaluation   POS Histamine 1mg/ml ++    NEG NaCl 0.9% -      No Substance Readings (15min) Evaluation   1 Alternaria alternata (tenuis)  -    2 Cladosporium herbarum -    3 Aspergillus fumigatus -    4 Penicillium notatum -    5 Dermatophagoides pteronyssinus +++    6 Dermatophagoides farinae -    7 Dog epithelium (canis spp) -    8 Cat hair (steve catus) -    9 Cockroach   (Blatella americana & germanica) -    10 Grass mix midwest   (Noris, Orchard, Redtop, Brayden) -    11  grass (sorghum halepense) -    12 Weed mix   (common Cocklebur, Lamb s quarters, rough redroot Pigweed, Dock/Sorrel) -    13 Mugwort (artemisia vulgare) -    14 Ragweed giant/short (ambrosia spp) -    15 English Plantain (plantago lanceolata) -    16 Tree mix 1 (Pecan, Maple BHR, Oak RVW, american [place], black [place]) NA    17 Red cedar (juniperus virginia) -    18 Tree mix 2   (white Dirk, river/red Birch, black [place], common Wyal[place], american Elm) NA    19 Box elder/Maple mix (acer spp) -    20 Tazewell shagbark (carya ovata) -       -    Conclusions: Patient is atopic with clear immediate type reaction to D.  Pteronyssinus (house dust mite). Patient should observe the molds and house dust mite areas and report back with photos if any delayed reaction there       [x] Allergic reaction diagnosed against: against metals (Cobalt, Nickel and chromate) and as well angry back --> could go well with atopic dermatitis   ==> the reactions are all erosions and not typical allergic, infiltrated lesions.     Interpretation/ remarks:   First of all, after removal patient has angry back (see photo). Based on that the results are difficult to obtain. However, all the reactions were well defined erosions without infiltration and therefore rather irritant reactions with underlying atopic dermatitis than real allergic reactions.  The only real reaction seems to be to the metals, particularly Nickel, which would go well with atopic dermatitis         Impression/Plan:    >> Severe exantematic, generalized Atopic Dermatitis: Atopy skin prick test on 7/8/2019 with clear immediate type reaction to D. Pteronyssinus (house dust mite).     Has been on Dupixent since 10/17/18. Initially 600mg subcutaneously, currently 300mg every other week.     Could consider patch test in the future given previous patch test (10/2018) was completed during an acute eczematous phase, so results were not clear on parabens and Methyldibromo Glutaronitrile.     [facility] to check CBC, kidney, liver, and inflammatory panels every couple months.    Use gentle free and clear soaps and shampoos  ==> atopic predisposition with clinically relevant sensitization to house dust mites (D. Pteronyssinus) with Rhinitis entire day = given infos for house dust mite reduction    PLAN      Continue with every 2 weeks Dupixent (controls the Exanthema, but not totally gone) .--> As of 7/20/20 HAVE to continue every 2 weeks dupixent injection at 300 mg every 14 days for at least another six months (important to keep that frequency because patient is controlled but still produces flare-ups at  the end of 2 weeks). Cannot reduce to every 3 weeks.     [facility] wants for the moment the patient to be followed up by our clinic ([facility])    Continue using topical triamcinolone 0.01% ointment about 2x weekly as necessary or if flare ups use it for about 3-4 days daily    Continue Vanicream Cream. Put on entire skin 2-3 times per day. Pt has very dry skin and might use one jar every 1-2 weeks. Refills provided today.    Prescribed for 10 days oral prednisone, just for the case he would need it = reserve    Return to clinic or [facility]-preferred dermatologist if this persists or return in December for intradermal test for dust mites and molds with photos.       2. Seborrheic Dermatitis   - Start ketoconazole 2% shampoo - apply three times a week     Follow-up in 6 months unless the [facility] prefers another dermatologist, then follow up with the [facility]'s preferred dermatologist.    Forward a copy of the notes to the patient's PCP in the [facility].      Thank you for the opportunity be involved in the care of this patient.     Staff: Felicia Ceballos LPN     Follow up in about 6-7 months    _____________________________________________________________________________    Teledermatology information:  - Location of patient: Home  - Patient presented in referral from:  other  - Location of teledermatologist:  [facility] (, [place], [place])  - Reason teledermatology is appropriate:  of National Emergency Regarding Coronavirus disease (COVID 19) Outbreak  - Method of transmission:  by Denator phone  - Date of images: na  - Service start time:10:06am  - Service end time:10:22am  - Date of report: 01/06/21      I spent a total of 16 minutes for telemedicine consult with Mike Naidu during today s phone meeting. Over 50% of this time was spent counseling the patient and/or coordinating care. Please see Assessment and Plan for details.

## 2021-01-06 NOTE — NURSING NOTE
Allergy Rooming Note    Mike Naidu's goals for this visit include:   Chief Complaint   Patient presents with     TRAVIS Mckeon is here for a follwo up relating to medications      Felicia Ceballos LPN

## 2021-01-07 ENCOUNTER — TELEPHONE (OUTPATIENT)
Dept: ALLERGY | Facility: CLINIC | Age: 71
End: 2021-01-07

## 2021-01-07 NOTE — TELEPHONE ENCOUNTER
LVM for patient to call back to confirm what needs to be sent to the [facility] to continue coverage of his medications.

## 2021-01-11 ENCOUNTER — TELEPHONE (OUTPATIENT)
Dept: DERMATOLOGY | Facility: CLINIC | Age: 71
End: 2021-01-11

## 2021-01-11 NOTE — TELEPHONE ENCOUNTER
[facility] Call Center    Phone Message    May a detailed message be left on voicemail: yes     Reason for Call: Other: Nubia from the [facility] called to say the pt's current Continuation of Care expires 2.18.21. So a new Request for Services form needs to be completed for Continuation of Care. Lynettekandi thought that you may have the form, if you don't, please call her at [phone], ext [phone], and she will fax a new form to you. When you have the form completed, making sure all of the asterisks are completed and physician signs and dates the form, fax back to F: [phone].Thanks.     Action Taken: Message routed to:  [facility] mike dermat    Travel Screening: Not Applicable

## 2021-01-11 NOTE — TELEPHONE ENCOUNTER
"Pt called and said that he will need the most recent doctor's note.  Pt will also need updated prescription sent to Dr. Sibley.  Pt also said that you will need to \"go to the community care and requesting that I get a updated referral\". Pt said that our clinic should have the fax and phone number. Please call the pt back if you have additional questions. Thanks  "

## 2021-06-07 ENCOUNTER — TELEPHONE (OUTPATIENT)
Dept: DERMATOLOGY | Facility: CLINIC | Age: 71
End: 2021-06-07

## 2021-06-07 DIAGNOSIS — L20.81 ATOPIC NEURODERMATITIS: ICD-10-CM

## 2021-06-07 DIAGNOSIS — L20.89 OTHER ATOPIC DERMATITIS: ICD-10-CM

## 2021-06-07 NOTE — TELEPHONE ENCOUNTER
Received a fax from [facility] pharmacy to have a refill request for triamcinolone (KENALOG) 0.1 % external ointment.  The prescription is either out of refills or is .  If in agreement, please send a new prescription.  If not in agreement, please inform the patient.    Patient would like a 1 pound jar.    Zan Padron, Facilitator

## 2021-06-08 RX ORDER — TRIAMCINOLONE ACETONIDE 1 MG/G
OINTMENT TOPICAL
Qty: 453.6 G | Refills: 6 | Status: SHIPPED | OUTPATIENT
Start: 2021-06-08 | End: 2022-07-29

## 2021-06-29 ENCOUNTER — TELEPHONE (OUTPATIENT)
Dept: DERMATOLOGY | Facility: CLINIC | Age: 71
End: 2021-06-29

## 2021-06-29 NOTE — TELEPHONE ENCOUNTER
[facility] Call Center    Phone Message    May a detailed message be left on voicemail: yes     Reason for Call: Symptoms or Concerns     If patient has red-flag symptoms, warm transfer to triage line    Current symptom or concern: break out all over his body    Symptoms have been present for:  2 week(s)    Has patient previously been seen for this? Yes    By : Dr Lanier    Date: NA    Are there any new or worsening symptoms? Yes: affecting sleep and lifestyle - said [facility] took him off some medications that were helping - wants to talk to Dr Lanier's nurse please      Action Taken: Message routed to:  [facility] Allergy    Travel Screening: Not Applicable

## 2021-06-29 NOTE — TELEPHONE ENCOUNTER
Writer talked to Mike who states the [facility] took him off the [facility]ixent due to his labs being abnormal. Now he is having sores on his arms and would like some prednisone.   Please advise.    EDI Mishra

## 2021-06-29 NOTE — TELEPHONE ENCOUNTER
Attempted to call patient back. Went directly to voicemail. Left message inquiring about medication that was stopped by the [facility]. Call back information given to the patient.     Lesly Malone RN

## 2021-06-30 NOTE — TELEPHONE ENCOUNTER
Called patient. Phone only rings once and goes straight to voicemail. Informed patient of this when leaving a message. Informed patient that the provider at the [facility] who took the patient off Dupixent without consulting , should be the one to prescribe prednisone. Dr. 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 Dr. Bloom does not know what their course of treatment is at this point. Writer attempted to reach someone at the [facility], but was unsuccessful.     Lesly Malone RN

## 2021-06-30 NOTE — TELEPHONE ENCOUNTER
[facility] Call Center    Phone Message    May a detailed message be left on voicemail: yes     Reason for Call: Other: Pt calling in upset he missed his call back, pt informed he can not wait for his appointment and needs a call back today before Dr Lanier leaves for the day, please call back as soon as possible      Action Taken: Message routed to:  [facility] derm     Travel Screening: Not Applicable

## 2021-07-01 ENCOUNTER — TRANSCRIBE ORDERS (OUTPATIENT)
Dept: OTHER | Age: 71
End: 2021-07-01

## 2021-07-01 DIAGNOSIS — J30.2 SEASONAL ALLERGIC RHINITIS: Primary | ICD-10-CM

## 2021-07-02 ENCOUNTER — TELEPHONE (OUTPATIENT)
Dept: DERMATOLOGY | Facility: CLINIC | Age: 71
End: 2021-07-02

## 2021-07-02 NOTE — TELEPHONE ENCOUNTER
[facility]    Phone Message    May a detailed message be left on voicemail: yes     Reason for Call: Appointment Intake    Referring Provider Name: Aidan Rollins in GENERIC EXTERNAL DATA DEPARTMENT  Diagnosis and/or Symptoms: Seasonal allergic rhinitis, allergic rhinitis- Pt would like to be seen in person and is scratching uncontrollably. [facility]. Pt is feeling miserable and would like to be seen ASAP. Please call Pt back to discuss. Thank you    Action Taken: Message routed to:  [facility] Allergy    Travel Screening: Not Applicable

## 2021-07-06 NOTE — TELEPHONE ENCOUNTER
[facility]    Phone Message    May a detailed message be left on voicemail: yes     Reason for Call: Other: Pt called regarding below. Pt has not heard back within the 24 hour nena. Pt said that he is miserable and cannot wait until Oct to be seen.  Please call him back. Thanks    Action Taken: Message routed to:  [facility] Allergy    Travel Screening: Not Applicable

## 2021-07-07 DIAGNOSIS — L21.9 DERMATITIS, SEBORRHEIC: ICD-10-CM

## 2021-07-07 RX ORDER — DUPILUMAB 300 MG/2ML
300 INJECTION, SOLUTION SUBCUTANEOUS
Qty: 2 ML | Refills: 3 | Status: SHIPPED | OUTPATIENT
Start: 2021-07-07 | End: 2023-07-05

## 2021-07-08 NOTE — TELEPHONE ENCOUNTER
[facility]    Phone Message    May a detailed message be left on voicemail: yes - Pt wants a long message left    Reason for Call: Appointment Intake    Referring Provider Name:   Aidan Rollins    Diagnosis and/or Symptoms:   Seasonal allergic rhinitis    Action Taken: Message routed to:  [facility] Allergy    Travel Screening: Not Applicable     Pt returning call from clinic. Pt is very agitated. Pt wants an appointment right away and states he can't wait until October. Pt requests call back from clinic.

## 2021-07-09 NOTE — TELEPHONE ENCOUNTER
Left message for patient explaining a [facility]. Instructed patient to follow-up with the pharmacy on where they are at in filling the prescription. Call back information given to the patient.     Lesly Malone RN

## 2021-09-30 ENCOUNTER — TELEPHONE (OUTPATIENT)
Dept: ALLERGY | Facility: CLINIC | Age: 71
End: 2021-09-30

## 2021-09-30 NOTE — TELEPHONE ENCOUNTER
[facility]    Phone Message    May a detailed message be left on voicemail: yes     Reason for Call: Order(s): Other:   Reason for requested:   Upcoming appt on 10/13/21    Date needed:   ASAP    Provider name:   Dr Lanier      Action Taken: Message routed to:  [facility] Allergy    Travel Screening: Not Applicable     Pt requests that labs/lab orders needed for 10/13/21 [facility] [phone].    Thanks!

## 2021-10-06 NOTE — TELEPHONE ENCOUNTER
Not able to leave a voicemail. Lab tests are not needed for next appointment.     Lesly Malone RN

## 2021-10-13 ENCOUNTER — OFFICE VISIT (OUTPATIENT)
Dept: ALLERGY | Facility: CLINIC | Age: 71
End: 2021-10-13
Payer: COMMERCIAL

## 2021-10-13 DIAGNOSIS — L20.89 OTHER ATOPIC DERMATITIS: Primary | ICD-10-CM

## 2021-10-13 PROCEDURE — 99214 OFFICE O/P EST MOD 30 MIN: CPT | Performed by: DERMATOLOGY

## 2021-10-13 RX ORDER — HYDROXYZINE HYDROCHLORIDE 25 MG/1
25 TABLET, FILM COATED ORAL EVERY EVENING
Qty: 30 TABLET | Refills: 3 | Status: SHIPPED | OUTPATIENT
Start: 2021-10-13 | End: 2022-07-29

## 2021-10-13 RX ORDER — EMOLLIENT BASE
CREAM (GRAM) TOPICAL 3 TIMES DAILY
Qty: 453 G | Refills: 11 | Status: SHIPPED | OUTPATIENT
Start: 2021-10-13 | End: 2022-07-29

## 2021-10-13 RX ORDER — TRIAMCINOLONE ACETONIDE 1 MG/G
OINTMENT TOPICAL
Qty: 80 G | Refills: 1 | Status: SHIPPED | OUTPATIENT
Start: 2021-10-14 | End: 2022-07-29

## 2021-10-13 RX ORDER — TACROLIMUS 1 MG/G
OINTMENT TOPICAL
Qty: 60 G | Refills: 3 | Status: SHIPPED | OUTPATIENT
Start: 2021-10-13 | End: 2022-07-29

## 2021-10-13 NOTE — LETTER
10/13/2021         RE: Mike Naidu  [id number] [address] [facility] [id number]        Dear Colleague,    Thank you for referring your patient, Mike Naidu, to the [facility] [place]. Please see a copy of my visit note below.    [facility] Dermato-allergology Note  Office visit  Encounter Date: Oct 13, 2021  ____________________________________________    CC: No chief complaint on file.      HPI:  (10/12/21)  Mr. Mike Naidu is a(n) 71 year old male who presents today as a return patient for allergy consultation  - [facility] and [facility] new PCP stopped the Dupixent because of the high WBC. Took patient off Dupixent from April to September. In May rash back with revenge and had severe flare ups that had to be treated with oral steroids (4x over the summer).  - patient had in April/May 2021 a full Lymphoma/Leucemia evaluation with bone marrow and there was no signs for hematologic malignancy. However, high Eosinophils and IgE of more than 4000  - End of September patient was put again on Dupixent. Now 6 weeks on the Dupixent and   - right after injection for 2-3 days some aggravation of rash, then improvement    Physical exam:  General: In no acute distress, well-developed, well-nourished  Eyes: no conjunctivitis  ENT: no signs of rhinitis   Pulmonary: no wheezing or coughing  Skin:in the moment diffuse erythema on trunk and some lichenification over the neck, but according to patient much better. Some erythema tita ani and intertriginous, but not active    Earlier History and Allergy exams:  01/2021  Patient on Dupixent since October 2018 = 2 years and has on and off attacks of eczemas on the entire body.  Problems start usually few days before next injection of Dupixent and therefore patient still needs the Dupixent.  Did not use the oral prednisone anymore (last time in August 2020)    Previous History of Present  Illness:    Patient has been on Dupixent since October 2018 and has dramatic improvement in his skin. Patient continues to report intermittent pruritus, mostly on his bilateral arms, back, and stomach. He receives dupixent every other week (next dose due 7/10). He applies Vanicream at least daily, sometimes more frequently to affected areas. He only applies triamcinolone 0.1% cream to the affected areas as needed.       7/20/20: still with occasional erythematous breakouts, mainly around abdomen and trunk, quite pruritic. Still happening every 3 or 4 days, but before dupixent was far more frequent for 4 years.  Feels the dupixent is definitely helping significantly. Also has pruritis when getting out of the hot shower, and so he limits these. Has not had any triggers he notices. Vanicream helps with breakouts. After dupixent injections, gets briefly inflamed, then at the end of the two weeks dermatitis begins to reappear. Thus, still having slight increase of dermatitis at the end of the two weeks.    Hx since 7/8/19v    Today (10/4/19) the pt comes in today for followup. He reports that he is breaking out. This flare up started 2.5 weeks ago without obvious source. Gaetano thinks this started improving last night. He currently has a widespread red, pruritic rash He continues the Dupixent injection every 2 weeks with his last injection yesterday. He is no longer taking the Zyrtec.    Hx since 10/4/19:  The patient comes in today (11/29/19) for followup. He reports that he was unable to obtain the medications prescribed at the last visit, notably the prednisone burst.  He states that he has been taking dupixent every 2 weeks and gets flare-ups that are intermittent. States that the symptoms subside after dupixent injection for a time but then he will still get flare-ups.  States that he has not used the triamcinolone cream in the interim due to it being a steroid.   No side effects from the dupixent.  The patient is  otherwise feeling well today. There are no other allergy concerns at this time.      Past Medical History:   - No history of eczema in childhood. No history of asthma.     No past surgical history on file.    Social History:  Patient reports that he has never smoked. He has never used smokeless tobacco. 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     Family History:  Atopic dermatitis in 2 of his grandchildren.     Medications:  Current Outpatient Medications   Medication Sig Dispense Refill     atorvastatin (LIPITOR) 80 MG tablet        cetirizine (ZYRTEC) 10 MG tablet        doxepin (SINEQUAN) 50 MG capsule        Dupilumab (DUPIXENT) 300 MG/2ML syringe Inject 2 mLs (300 mg) Subcutaneous every 14 days 2 mL 3     emollient (VANICREAM) external cream Apply topically 3 times daily Apply to entire body three times a day to help maintain skin barrier. 453 g 10     emollient (VANICREAM) external cream Put on entire skin 2-3 times per day. Very dry skin and might use one jar every 1-2 weeks. 453 g 8     FLOMAX 0.4 MG capsule        folic acid (FOLVITE) 1 MG tablet        gabapentin (NEURONTIN) 300 MG capsule        hydrOXYzine (ATARAX) 25 MG tablet        ketoconazole (NIZORAL) 2 % external shampoo Wash hair every other day 120 mL 3     pantoprazole (PROTONIX) 40 MG EC tablet        predniSONE (DELTASONE) 20 MG tablet Start at 50mg (today and tomorrow morning) and then reduce by 5mg every day ==> therefore after 11 days stop Prednisone  Please provide patient with enough 20mg and 5mg Tabl 22 tablet 0     predniSONE (DELTASONE) 5 MG tablet Take 2 tablets (10 mg) by mouth daily 15 tablet 0     RANITIDINE 150 MAX STRENGTH 150 MG tablet        triamcinolone (KENALOG) 0.1 % cream        triamcinolone (KENALOG) 0.1 % external ointment If flare ups, use for 3-4 days on itchy, eczematous lesions 1-2 times daily. Otherwise use 2xweekly on remaining lesions 453.6 g 6     Allergies    Allergen Reactions     Procaine Swelling     Sulfa Drugs Swelling and Itching     swelling     Valproic Acid Itching and Rash     hives         Review of Systems:  -Constitutional: Otherwise feeling well today, in usual state of health.  -Skin: As above in HPI. No additional skin concerns.    Physical exam:  Vitals: There were no vitals taken for this visit.  GEN: This is a well developed, well-nourished male in no acute distress, in a pleasant mood.    SKIN: Focused examination of the hands, arms, legs, back, abdomen, and face was performed.  - over abdominal and flank and gluteal area erythema and slight infiltration and as well inner part of tights.  - No clear extensive excoriations  However, remarkable improvement still      Previous Patch Testing for Reference:    Order for PATCH TESTS    [] Outpatient  [] Inpatient: [facility]..../ Bed ....      Skin Atopy (atopic dermatitis) [x] Yes   [] No  Rhinitis/Sinusitis:   [x] Yes   [] No  Allergic Asthma:   [] Yes   [x] No  Food Allergy:   [] Yes   [x] No  Leg ulcers:   [] Yes   [x] No  Hand eczema:   [x] Yes   [] No   Leading hand:   [x] R   [] L       [] Ambidextrous                        Reason for tests (suspected allergy): generalized subacute to chronic eczema with spongiosis and peripheral and skin Eosinophilia  Known previous allergies: Sulphonamides, Valproic acid and Procaine    Standardized panels  [x] Standard panel (40 tests)  [x] Preservatives & Antimicrobials (31 tests)  [x] Emulsifiers & Additives (25 tests)   [] Perfumes/Flavours & Plants (25 tests)  [] Hairdresser panel (12 tests)  [] Rubber Chemicals (22 tests)  [] Plastics (26 tests)  [] Colorants/Dyes/Food additives (20 tests)  [] Metals (implants/dental) (23 tests)  [] Local anaesthetics/NSAIDs (12 tests)  [] Antibiotics & Antimycotics (14 tests)   [x] Corticosteroids (15 tests)   [] Photopatch test (32 tests)   [] others: ...      [] Patient's own products: ...    DO NOT test if chemical or  biological identity is unknown!     always ask from patient the product information and safety sheets (MSDS)     [] Patient needs consultation with Allergy team (changes of tests may apply)  [x] Tests discussed with Allergy team (can have direct appointment for patch tests)    RESULTS & EVALUATION of PATCH TESTS    Date/time of application:  Physician/Nurse:  / Felicia Ceballos LPN               Localization of application: Back --> on lower back chronic eczema, but upper back can be used for patch tests. Rather dry there, but no acute eczema    Patch test readings after     [x] 2 days, [] 3 days [x] 4 days, [] 5 days,   Other duration: ...    Applied patch tests with results (import here the list of patch tests):  Date/time of application:10/01/18   Physician/Nurse:  / Felicia Ceballos LPN               Localization of application: Back     STANDARD Series         No Substance 2 days 4 days remarks   1 Jhony Mix [C] - -    2 Colophony - -    3  2-Mercaptobenzothiazole  - -     4 Methylisothiazolinone - -    5 Carba Mix - -    6 Thiurma Mix [A] - -    7 Bisphenol A Epoxy Resin - -    8 X-Jhlj-Ljnljldfdep-Formaldehyde Resin - -    9 Mercapto Mix [A] - -    10 Black Rubber Mix- PPD [B] - -    11 Potassium Dichromate  + +/++ 5mm erosion   12 Balsam of [place] (Myroxylon Pereirae Resin) - -    13 Nickel Sulphate Hexahydrate +/++ ++ 14mm erosion   14 Mixed Dialkyl Thiourea - -    15 Paraben Mix [B] (+) + 4mm erosion   16 Methyldibromo Glutaronitrile - + 4mm erosion   17 Fragrance Mix - -    18 2-Bromo-2-Nitropropane-1,3-Diol (Bronopol) - -    19 Lyral - -    20 Tixocortol-21- Pivalate - -    21 Diazolidiyl Urea (Germall II) - -    22 Methyl Methacrylate - -    23 Cobalt (II) Chloride Hexahydrate + -    24 Fragrance Mix II  - -    25 Compositae Mix - -    26 Benzoyl Peroxide - -    27 Bacitracin - -    28 Formaldehyde - -    29 Methylchloroisothiazolinone / Methylisothiazolinone - -    30  Corticosteroid Mix - -    31 Sodium Lauryl Sulfate - -    32 Lanolin Alcohol - -    33 Turpentine - -    34 Cetylstearylalcohol - -    35 Chlorhexidine Dicluconate - -    36 Budenoside - -    37 Imidazolidinyl Urea  - -    38 Ethyl-2 Cyanoacrylate - -    39 Quaternium 15 (Dowicil 200) - -    40 Decyl Glucoside - -      PRESERVATIVES & ANTIMICROBIALS         No Substance 2 days 4 days remarks   41  1,2-Benzisothiazoline-3-One, Sodium Salt - -    42  1,3,5-Antwan (2-Hydroxyethyl) - Hexahydrotriazine (Grotan BK) - -    43 4-Gjwrtckjuqcjv-4-Nitro-1, 3-Propanediol - -    44  3, 4, 4' - Triclocarban - -    45 4 - Chloro - 3 - Cresol - -    46 4 - Chloro - 4 - Xylenol (PCMX) - -    47 7-Ethylbicyclooxazolidine (Leap [employer] Entertainmentan [id number]) - -    48 Benzalkonium Chloride - -    49 Benzyl Alcohol - -    50 Cetalkonium Chloride - -    51 Cetylpyrimidine Chloride  - -    NA Chloroacetamide NA NA    52 DMDM Hydantoin - -    53 Glutaraldehyde - -    54 Triclosan - -    55 Glyoxal Trimeric Dihydrate (+) + 7mm Erosion   56 Iodopropynyl Butylcarbamate - -    57 Octylisothiazoline - -    58 Iodoform - -    59 (Nitrobutyl) Morpholine/(Ethylnitro-Trimethylene) Dimorpholine (Bioban P 1487) - -    60 Phenoxyethanol - -    61 Phenyl Salicylate - -    62 Povidone Iodine - -    63 Sodium Benzoate - -    64 Sodium Disulfite - -    65 Sorbic Acid - -    66 Thimerosal - -     Parabens      67 Butyl-P-Hydroxybenzoate - -    68 Ethyl-P-Hydroxybenzoate - -    69 Methyl-P-Hydroxybenzoate - -    70 Propyl-P-Hydroxybenzoate - -      EMULSIFIERS & ADDITIVES        No Substance 2 days 4 days remarks   71 Polyethylene Glycol-400 (+) -    72 Cocamidopropyl Betaine - -    73 Amerchol L101 - -    74 Propylene Glycol - -    75 Triethanolamine - -    76 Sorbitane Sesquiolate - -    77 Isopropylmyristate (+) -    78 Polysorbate 80  - -    79 Amidoamine   (Stearamidopropyl Dimethylamine) - -    80 Oleamidopropyl Dimethylamine - -    81 Lauryl Glucoside - -    82 Coconut  Diethanolamide  - -    83 2-Hydroxy-4-Methoxy Benzophenone (Oxybenzone) - -    84 Benzophenone-4 (Sulisobenzon) - -    85 Propolis - -    86 Dexpanthenol - -    87 Carboxymethyl Cellulose Sodium - -    88 Abitol - -    89 Tert-Butylhydroquinone - -    90 Benzyl Salicylate - -     Antioxidant      91 Dodecyl Gallate - -    92 Butylhydroxyanisole (BHA) - -    93 Butylhydroxytoluene (BHT) - -    94 Di-Alpha-Tocopherol (Vit E) - -    95 Propyl Gallate - -      CORTICOSTEROIDS    No Substance 2 days 4 days remarks Allergy  class   96 Amcinonide - -  B   97 Betametasone-17,21 Dipropionate - -  D1   98 Desoximetasone - -  C   99 Betamethasone-17-Valerate - -  D1   100 Dexamethasone - -  C   101 Hydrocortisone - -  A   102 Clobetasol-17-Propionate - -  D1   103 Dexamethasone-21-Phosphate Disodium Salt - -  C   104 Hydrocortisone-17 Butyrate - -  D2   105 Prednisolone - -  A   106 Mometason Furoate - -  D1   107 Triamcinolone Acetonide - -  B   108 Methylprednisolone Aceponate - -  D2   109 Hydrocortisone-21-Acetate - -  A   110 Prednicarbate - -  D2       Group Characteristics of group Generic name Name  cross reactions   A Hydrocortisone   Cloprednole, Fludrocortisone acétate, Hydrocortison acetate, Methylprednisolone, Prednisolone, Tixocortolpivalate Alfacortone, Fucidin H, Dermacalm, Hexacortone, Premandole, Imacort With group D2   B Triamcinolone-acetonide   Budenoside (R-isomer), Amcinonide, Desonide, Fluocinolone acetonide, Triamcinolone acetonide Locapred, Locatop  Synalar, Pevisone, [place] -   C Betamethasone (Without Romina)   Betamethasone, Dexamethasone, Flumethasone pivalate, Halomethasone Daivobet, Dexasalyl, Locasalen,   -   D1 Betamethasone-diproprionate   Betamethasone dipropionate, Betamethasone-17-valerate, Clobetasole-propionate, Fluticasone propionate, Mometasone furoate Betnovate, Diprogenta, Diprosalic, Diprosone, Celestoderm, Fucicort,  Cutivate, Axotide, Elocom -   D2 Methylprednisolone-aceponate    Hydrocortisone-aceponate, Hydrocortisone-buteprate, Hydrocortisone-17-butyrate, Methylprednisolone aceponate, Prednicarbate Locoïd, Advantan,  Prednitop With group A and Budesonide (S-isomer)     Results of patch tests:                         Interpretation:    - Negative                    A    = Allergic      (+) Erythema    TI   = Toxic/irritant   + E + Infiltration    RaP = Relevance at Present     ++ E/I + Papulovesicle   Rpr  = Relevance Previously     +++ E/I/P + Blister     nR   = No Relevance    Atopy Screen (07/08/2019)    No Substance Readings (15min) Evaluation   POS Histamine 1mg/ml ++    NEG NaCl 0.9% -      No Substance Readings (15min) Evaluation   1 Alternaria alternata (tenuis)  -    2 Cladosporium herbarum -    3 Aspergillus fumigatus -    4 Penicillium notatum -    5 Dermatophagoides pteronyssinus +++    6 Dermatophagoides farinae -    7 Dog epithelium (canis spp) -    8 Cat hair (steve catus) -    9 Cockroach   (Blatella americana & germanica) -    10 Grass mix midwest   (Noris, Orchard, Redtop, Brayden) -    11  grass (sorghum halepense) -    12 Weed mix   (common Cocklebur, Lamb s quarters, rough redroot Pigweed, Dock/Sorrel) -    13 Mugwort (artemisia vulgare) -    14 Ragweed giant/short (ambrosia spp) -    15 English Plantain (plantago lanceolata) -    16 Tree mix 1 (Pecan, Maple BHR, Oak RVW, american Warriors Mark, black [place]) NA    17 Red cedar (juniperus virginia) -    18 Tree mix 2   (white Dirk, river/red Birch, black [place], common Zalma, american Elm) NA    19 Box elder/Maple mix (acer spp) -    20 [place] shagbark (carya ovata) -       -    Conclusions: Patient is atopic with clear immediate type reaction to D. Pteronyssinus (house dust mite). Patient should observe the molds and house dust mite areas and report back with photos if any delayed reaction there       [x] Allergic reaction diagnosed against: against metals (Cobalt, Nickel and chromate) and as well angry back -->  could go well with atopic dermatitis   ==> the reactions are all erosions and not typical allergic, infiltrated lesions.     Interpretation/ remarks:   First of all, after removal patient has angry back (see photo). Based on that the results are difficult to obtain. However, all the reactions were well defined erosions without infiltration and therefore rather irritant reactions with underlying atopic dermatitis than real allergic reactions.  The only real reaction seems to be to the metals, particularly Nickel, which would go well with atopic dermatitis      Assessment & Plan:    ==> Final Diagnosis:     #  Severe exantematic, generalized Atopic Dermatitis: Atopy skin prick test on 7/8/2019 with clear immediate type reaction to D. Pteronyssinus (house dust mite).   * chronic illness with exacerbation, progression, side effects from treatment    Has been on Dupixent since 10/17/18. Initially 600mg subcutaneously, currently 300mg every other week.     Could consider patch test in the future given previous patch test (10/2018) was completed during an acute eczematous phase, so results were not clear on parabens and Methyldibromo Glutaronitrile.     [facility] CBC, kidney, liver, and inflammatory panels every couple months.    Use gentle free and clear soaps and shampoos  ==> atopic predisposition with clinically relevant sensitization to house dust mites (D. Pteronyssinus) with Rhinitis entire day = given infos for house dust mite reduction    Return to clinic or [facility]-pref[facility] if this persists or return in December for intradermal test for dust mites and molds with photos.     # Seborrheic Dermatitis   * chronic illness with exacerbation, progression, side effects from treatment  - Start ketoconazole 2% shampoo - apply three times a week     Follow-up in 6 months unless the [facility] prefers another dermatologist, then follow up with the [facility]'s preferred dermatologist.      These conclusions are made at the best  of one's knowledge and belief based on the provided evidence such as patient's history and allergy test results and they can change over time or can be incomplete because of missing information's.    ==> Treatment Plan:      Continue with every 2 weeks Dupixent (controls the Exanthema, but not totally gone) .--> As of 7/20/20 HAVE to continue every 2 weeks dupixent injection at 300 mg every 14 days for at least another six months (important to keep that frequency because patient is controlled but still produces flare-ups at the end of 2 weeks). Cannot reduce to every 3 weeks.     [facility] wants for the moment the patient to be followed up by our clinic ([facility])    Continue using topical triamcinolone 0.01% ointment on Weekends and Protopic 0.1% Monday to Friday 2xdaily on red, itchy areas.     Continue Vanicream Cream. Put on entire skin 2-3 times per day. Pt has very dry skin and might use one jar every 1-2 weeks. Refills provided today.    Hydroxyzine 25m every evening, Cetirizine just prn    Prescribed for 10 days oral prednisone, just for the case he would need it = reserve      Staff: : provider    Follow-up in Derm-Allergy clinic in January 2022 (do CBC diff and total IgE before)  ___________________________      I spent a total of 30 minutes with Mike Naidu during today s  visit. This time was spent discussing all the individual test results, correlating them to the clinical relevance, counseling the patient and/or coordinating care            Again, thank you for allowing me to participate in the care of your patient.        Sincerely,        Leon Lanier MD

## 2021-10-13 NOTE — NURSING NOTE
Chief Complaint   Patient presents with     Allergy Recheck     Gaetano is here today to discuss maintenance of Dupixent after [facility] took [facility] off     Lesly Malone RN

## 2021-10-13 NOTE — PROGRESS NOTES
[facility] Dermato-allergology Note  Office visit  Encounter Date: Oct 13, 2021  ____________________________________________    CC: No chief complaint on file.      HPI:  (10/12/21)  Mr. Mike Naidu is a(n) 71 year old male who presents today as a return patient for allergy consultation  - [facility] and the new PCP stopped the Dupixent because of the high WBC. Took patient off Dupixent from April to September. In May rash back with revenge and had severe flare ups that had to be treated with oral steroids (4x over the summer).  - patient had in April/May 2021 a full Lymphoma/Leucemia evaluation with bone marrow and there was no signs for hematologic malignancy. However, high Eosinophils and IgE of more than 4000  - End of September patient was put again on Dupixent. Now 6 weeks on the Dupixent and   - right after injection for 2-3 days some aggravation of rash, then improvement    Physical exam:  General: In no acute distress, well-developed, well-nourished  Eyes: no conjunctivitis  ENT: no signs of rhinitis   Pulmonary: no wheezing or coughing  Skin:in the moment diffuse erythema on trunk and some lichenification over the neck, but according to patient much better. Some erythema tita ani and intertriginous, but not active    Earlier History and Allergy exams:  01/2021  Patient on Dupixent since October 2018 = 2 years and has on and off attacks of eczemas on the entire body.  Problems start usually few days before next injection of Dupixent and therefore patient still needs the Dupixent.  Did not use the oral prednisone anymore (last time in August 2020)    Previous History of Present Illness:    Patient has been on Dupixent since October 2018 and has dramatic improvement in his skin. Patient continues to report intermittent pruritus, mostly on his bilateral arms, back, and stomach. He receives dupixent every other week (next dose due 7/10). He applies Vanicream at least  daily, sometimes more frequently to affected areas. He only applies triamcinolone 0.1% cream to the affected areas as needed.       7/20/20: still with occasional erythematous breakouts, mainly around abdomen and trunk, quite pruritic. Still happening every 3 or 4 days, but before dupixent was far more frequent for 4 years.  Feels the dupixent is definitely helping significantly. Also has pruritis when getting out of the hot shower, and so he limits these. Has not had any triggers he notices. Vanicream helps with breakouts. After dupixent injections, gets briefly inflamed, then at the end of the two weeks dermatitis begins to reappear. Thus, still having slight increase of dermatitis at the end of the two weeks.    Hx since 7/8/19v    Today (10/4/19) the pt comes in today for followup. He reports that he is breaking out. This flare up started 2.5 weeks ago without obvious source. Gaetano thinks this started improving last night. He currently has a widespread red, pruritic rash He continues the Dupixent injection every 2 weeks with his last injection yesterday. He is no longer taking the Zyrtec.    Hx since 10/4/19:  The patient comes in today (11/29/19) for followup. He reports that he was unable to obtain the medications prescribed at the last visit, notably the prednisone burst.  He states that he has been taking dupixent every 2 weeks and gets flare-ups that are intermittent. States that the symptoms subside after dupixent injection for a time but then he will still get flare-ups.  States that he has not used the triamcinolone cream in the interim due to it being a steroid.   No side effects from the dupixent.  The patient is otherwise feeling well today. There are no other allergy concerns at this time.      Past Medical History:   - No history of eczema in childhood. No history of asthma.     No past surgical history on file.    Social History:  Patient reports that he has never smoked. He has never used  smokeless tobacco. 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     Family History:  Atopic dermatitis in 2 of his grandchildren.     Medications:  Current Outpatient Medications   Medication Sig Dispense Refill     atorvastatin (LIPITOR) 80 MG tablet        cetirizine (ZYRTEC) 10 MG tablet        doxepin (SINEQUAN) 50 MG capsule        Dupilumab (DUPIXENT) 300 MG/2ML syringe Inject 2 mLs (300 mg) Subcutaneous every 14 days 2 mL 3     emollient (VANICREAM) external cream Apply topically 3 times daily Apply to entire body three times a day to help maintain skin barrier. 453 g 10     emollient (VANICREAM) external cream Put on entire skin 2-3 times per day. Very dry skin and might use one jar every 1-2 weeks. 453 g 8     FLOMAX 0.4 MG capsule        folic acid (FOLVITE) 1 MG tablet        gabapentin (NEURONTIN) 300 MG capsule        hydrOXYzine (ATARAX) 25 MG tablet        ketoconazole (NIZORAL) 2 % external shampoo Wash hair every other day 120 mL 3     pantoprazole (PROTONIX) 40 MG EC tablet        predniSONE (DELTASONE) 20 MG tablet Start at 50mg (today and tomorrow morning) and then reduce by 5mg every day ==> therefore after 11 days stop Prednisone  Please provide patient with enough 20mg and 5mg Tabl 22 tablet 0     predniSONE (DELTASONE) 5 MG tablet Take 2 tablets (10 mg) by mouth daily 15 tablet 0     RANITIDINE 150 MAX STRENGTH 150 MG tablet        triamcinolone (KENALOG) 0.1 % cream        triamcinolone (KENALOG) 0.1 % external ointment If flare ups, use for 3-4 days on itchy, eczematous lesions 1-2 times daily. Otherwise use 2xweekly on remaining lesions 453.6 g 6     Allergies   Allergen Reactions     Procaine Swelling     Sulfa Drugs Swelling and Itching     swelling     Valproic Acid Itching and Rash     hives         Review of Systems:  -Constitutional: Otherwise feeling well today, in usual state of health.  -Skin: As above in HPI. No additional skin  concerns.    Physical exam:  Vitals: There were no vitals taken for this visit.  GEN: This is a well developed, well-nourished male in no acute distress, in a pleasant mood.    SKIN: Focused examination of the hands, arms, legs, back, abdomen, and face was performed.  - over abdominal and flank and gluteal area erythema and slight infiltration and as well inner part of tights.  - No clear extensive excoriations  However, remarkable improvement still      Previous Patch Testing for Reference:    Order for PATCH TESTS    [] Outpatient  [] Inpatient: [facility]..../ Bed ....      Skin Atopy (atopic dermatitis) [x] Yes   [] No  Rhinitis/Sinusitis:   [x] Yes   [] No  Allergic Asthma:   [] Yes   [x] No  Food Allergy:   [] Yes   [x] No  Leg ulcers:   [] Yes   [x] No  Hand eczema:   [x] Yes   [] No   Leading hand:   [x] R   [] L       [] Ambidextrous                        Reason for tests (suspected allergy): generalized subacute to chronic eczema with spongiosis and peripheral and skin Eosinophilia  Known previous allergies: Sulphonamides, Valproic acid and Procaine    Standardized panels  [x] Standard panel (40 tests)  [x] Preservatives & Antimicrobials (31 tests)  [x] Emulsifiers & Additives (25 tests)   [] Perfumes/Flavours & Plants (25 tests)  [] Hairdresser panel (12 tests)  [] Rubber Chemicals (22 tests)  [] Plastics (26 tests)  [] Colorants/Dyes/Food additives (20 tests)  [] Metals (implants/dental) (23 tests)  [] Local anaesthetics/NSAIDs (12 tests)  [] Antibiotics & Antimycotics (14 tests)   [x] Corticosteroids (15 tests)   [] Photopatch test (32 tests)   [] others: ...      [] Patient's own products: ...    DO NOT test if chemical or biological identity is unknown!     always ask from patient the product information and safety sheets (MSDS)     [] Patient needs consultation with Allergy team (changes of tests may apply)  [x] Tests discussed with Allergy team (can have direct appointment for patch tests)    RESULTS &  EVALUATION of PATCH TESTS    Date/time of application:  Physician/Nurse:  / Felicia Ceballos LPN               Localization of application: Back --> on lower back chronic eczema, but upper back can be used for patch tests. Rather dry there, but no acute eczema    Patch test readings after     [x] 2 days, [] 3 days [x] 4 days, [] 5 days,   Other duration: ...    Applied patch tests with results (import here the list of patch tests):  Date/time of application:10/01/18   Physician/Nurse:  / Felicia Ceballos LPN               Localization of application: Back     STANDARD Series         No Substance 2 days 4 days remarks   1 Jhony Mix [C] - -    2 Colophony - -    3  2-Mercaptobenzothiazole  - -     4 Methylisothiazolinone - -    5 Carba Mix - -    6 Thiurma Mix [A] - -    7 Bisphenol A Epoxy Resin - -    8 W-Xmxg-Hvaxfohcmyb-Formaldehyde Resin - -    9 Mercapto Mix [A] - -    10 Black Rubber Mix- PPD [B] - -    11 Potassium Dichromate  + +/++ 5mm erosion   12 Balsam of [place] (Myroxylon Pereirae Resin) - -    13 Nickel Sulphate Hexahydrate +/++ ++ 14mm erosion   14 Mixed Dialkyl Thiourea - -    15 Paraben Mix [B] (+) + 4mm erosion   16 Methyldibromo Glutaronitrile - + 4mm erosion   17 Fragrance Mix - -    18 2-Bromo-2-Nitropropane-1,3-Diol (Bronopol) - -    19 Lyral - -    20 Tixocortol-21- Pivalate - -    21 Diazolidiyl Urea (Germall II) - -    22 Methyl Methacrylate - -    23 Cobalt (II) Chloride Hexahydrate + -    24 Fragrance Mix II  - -    25 Compositae Mix - -    26 Benzoyl Peroxide - -    27 Bacitracin - -    28 Formaldehyde - -    29 Methylchloroisothiazolinone / Methylisothiazolinone - -    30 Corticosteroid Mix - -    31 Sodium Lauryl Sulfate - -    32 Lanolin Alcohol - -    33 Turpentine - -    34 Cetylstearylalcohol - -    35 Chlorhexidine Dicluconate - -    36 Budenoside - -    37 Imidazolidinyl Urea  - -    38 Ethyl-2 Cyanoacrylate - -    39 Quaternium 15 (Dowicil 200) - -    40  Decyl Glucoside - -      PRESERVATIVES & ANTIMICROBIALS         No Substance 2 days 4 days remarks   41  1,2-Benzisothiazoline-3-One, Sodium Salt - -    42  1,3,5-Antwan (2-Hydroxyethyl) - Hexahydrotriazine (Grotan BK) - -    43 0-Ckrwhgppfpjjv-4-Nitro-1, 3-Propanediol - -    44  3, 4, 4' - Triclocarban - -    45 4 - Chloro - 3 - Cresol - -    46 4 - Chloro - 4 - Xylenol (PCMX) - -    47 7-Ethylbicyclooxazolidine (Bioban EH6441) - -    48 Benzalkonium Chloride - -    49 Benzyl Alcohol - -    50 Cetalkonium Chloride - -    51 Cetylpyrimidine Chloride  - -    NA Chloroacetamide NA NA    52 DMDM Hydantoin - -    53 Glutaraldehyde - -    54 Triclosan - -    55 Glyoxal Trimeric Dihydrate (+) + 7mm Erosion   56 Iodopropynyl Butylcarbamate - -    57 Octylisothiazoline - -    58 Iodoform - -    59 (Nitrobutyl) Morpholine/(Ethylnitro-Trimethylene) Dimorpholine (Bioban P 1487) - -    60 Phenoxyethanol - -    61 Phenyl Salicylate - -    62 Povidone Iodine - -    63 Sodium Benzoate - -    64 Sodium Disulfite - -    65 Sorbic Acid - -    66 Thimerosal - -     Parabens      67 Butyl-P-Hydroxybenzoate - -    68 Ethyl-P-Hydroxybenzoate - -    69 Methyl-P-Hydroxybenzoate - -    70 Propyl-P-Hydroxybenzoate - -      EMULSIFIERS & ADDITIVES        No Substance 2 days 4 days remarks   71 Polyethylene Glycol-400 (+) -    72 Cocamidopropyl Betaine - -    73 Amerchol L101 - -    74 Propylene Glycol - -    75 Triethanolamine - -    76 Sorbitane Sesquiolate - -    77 Isopropylmyristate (+) -    78 Polysorbate 80  - -    79 Amidoamine   (Stearamidopropyl Dimethylamine) - -    80 Oleamidopropyl Dimethylamine - -    81 Lauryl Glucoside - -    82 Coconut Diethanolamide  - -    83 2-Hydroxy-4-Methoxy Benzophenone (Oxybenzone) - -    84 Benzophenone-4 (Sulisobenzon) - -    85 Propolis - -    86 Dexpanthenol - -    87 Carboxymethyl Cellulose Sodium - -    88 Abitol - -    89 Tert-Butylhydroquinone - -    90 Benzyl Salicylate - -     Antioxidant       91 Dodecyl Gallate - -    92 Butylhydroxyanisole (BHA) - -    93 Butylhydroxytoluene (BHT) - -    94 Di-Alpha-Tocopherol (Vit E) - -    95 Propyl Gallate - -      CORTICOSTEROIDS    No Substance 2 days 4 days remarks Allergy  class   96 Amcinonide - -  B   97 Betametasone-17,21 Dipropionate - -  D1   98 Desoximetasone - -  C   99 Betamethasone-17-Valerate - -  D1   100 Dexamethasone - -  C   101 Hydrocortisone - -  A   102 Clobetasol-17-Propionate - -  D1   103 Dexamethasone-21-Phosphate Disodium Salt - -  C   104 Hydrocortisone-17 Butyrate - -  D2   105 Prednisolone - -  A   106 Mometason Furoate - -  D1   107 Triamcinolone Acetonide - -  B   108 Methylprednisolone Aceponate - -  D2   109 Hydrocortisone-21-Acetate - -  A   110 Prednicarbate - -  D2       Group Characteristics of group Generic name Name  cross reactions   A Hydrocortisone   Cloprednole, Fludrocortisone acétate, Hydrocortison acetate, Methylprednisolone, Prednisolone, Tixocortolpivalate Alfacortone, Fucidin H, Dermacalm, Hexacortone, Premandole, Imacort With group D2   B Triamcinolone-acetonide   Budenoside (R-isomer), Amcinonide, Desonide, Fluocinolone acetonide, Triamcinolone acetonide Locapred, Locatop  Synalar, Pevisone, Kenacort -   C Betamethasone (Without Romina)   Betamethasone, Dexamethasone, Flumethasone pivalate, Halomethasone Daivobet, Dexasalyl, Locasalen,   -   D1 Betamethasone-diproprionate   Betamethasone dipropionate, Betamethasone-17-valerate, Clobetasole-propionate, Fluticasone propionate, Mometasone furoate Betnovate, Diprogenta, Diprosalic, Diprosone, Celestoderm, Fucicort,  Cutivate, Axotide, Elocom -   D2 Methylprednisolone-aceponate   Hydrocortisone-aceponate, Hydrocortisone-buteprate, Hydrocortisone-17-butyrate, Methylprednisolone aceponate, Prednicarbate Locoïd, Advantan,  Prednitop With group A and Budesonide (S-isomer)     Results of patch tests:                         Interpretation:    - Negative                    A     = Allergic      (+) Erythema    TI   = Toxic/irritant   + E + Infiltration    RaP = Relevance at Present     ++ E/I + Papulovesicle   Rpr  = Relevance Previously     +++ E/I/P + Blister     nR   = No Relevance    Atopy Screen (07/08/2019)    No Substance Readings (15min) Evaluation   POS Histamine 1mg/ml ++    NEG NaCl 0.9% -      No Substance Readings (15min) Evaluation   1 Alternaria alternata (tenuis)  -    2 Cladosporium herbarum -    3 Aspergillus fumigatus -    4 Penicillium notatum -    5 Dermatophagoides pteronyssinus +++    6 Dermatophagoides farinae -    7 Dog epithelium (canis spp) -    8 Cat hair (steve catus) -    9 Cockroach   (Blatella americana & germanica) -    10 Grass mix midwest   (Noris, Orchard, Redtop, Brayden) -    11  grass (sorghum halepense) -    12 Weed mix   (common Cocklebur, Lamb s quarters, rough redroot Pigweed, Dock/Sorrel) -    13 Mugwort (artemisia vulgare) -    14 Ragweed giant/short (ambrosia spp) -    15 English Plantain (plantago lanceolata) -    16 Tree mix 1 (Pecan, Maple BHR, Oak RVW, american Pelham, black Ettrick) NA    17 Red cedar (juniperus virginia) -    18 Tree mix 2   (white Dirk, river/red Birch, black Adams, common Berrien, american Elm) NA    19 Box elder/Maple mix (acer spp) -    20 Penobscot shagbark (carya ovata) -       -    Conclusions: Patient is atopic with clear immediate type reaction to D. Pteronyssinus (house dust mite). Patient should observe the molds and house dust mite areas and report back with photos if any delayed reaction there       [x] Allergic reaction diagnosed against: against metals (Cobalt, Nickel and chromate) and as well angry back --> could go well with atopic dermatitis   ==> the reactions are all erosions and not typical allergic, infiltrated lesions.     Interpretation/ remarks:   First of all, after removal patient has angry back (see photo). Based on that the results are difficult to obtain. However, all the reactions  were well defined erosions without infiltration and therefore rather irritant reactions with underlying atopic dermatitis than real allergic reactions.  The only real reaction seems to be to the metals, particularly Nickel, which would go well with atopic dermatitis      Assessment & Plan:    ==> Final Diagnosis:     #  Severe exantematic, generalized Atopic Dermatitis: Atopy skin prick test on 7/8/2019 with clear immediate type reaction to D. Pteronyssinus (house dust mite).   * chronic illness with exacerbation, progression, side effects from treatment    Has been on Dupixent since 10/17/18. Initially 600mg subcutaneously, currently 300mg every other week.     Could consider patch test in the future given previous patch test (10/2018) was completed during an acute eczematous phase, so results were not clear on parabens and Methyldibromo Glutaronitrile.     [facility], kidney, liver, and inflammatory panels every couple months.    Use gentle free and clear soaps and shampoos  ==> atopic predisposition with clinically relevant sensitization to house dust mites (D. Pteronyssinus) with Rhinitis entire day = given infos for house dust mite reduction    Return to clinic or [facility]pref[facility] if this persists or return in December for intradermal test for dust mites and molds with photos.     # Seborrheic Dermatitis   * chronic illness with exacerbation, progression, side effects from treatment  - Start ketoconazole 2% shampoo - apply three times a week     Follow-up in 6 months unless the [facility] prefers another dermatologist, then follow up with the [facility]'s preferred dermatologist.      These conclusions are made at the best of one's knowledge and belief based on the provided evidence such as patient's history and allergy test results and they can change over time or can be incomplete because of missing information's.    ==> Treatment Plan:      Continue with every 2 weeks Dupixent (controls the Exanthema, but not  totally gone) .--> As of 7/20/20 HAVE to continue every 2 weeks dupixent injection at 300 mg every 14 days for at least another six months (important to keep that frequency because patient is controlled but still produces flare-ups at the end of 2 weeks). Cannot reduce to every 3 weeks.     [facility] wants for the moment the patient to be followed up by our clinic ([facility])    Continue using topical triamcinolone 0.01% ointment on Weekends and Protopic 0.1% Monday to Friday 2xdaily on red, itchy areas.     Continue Vanicream Cream. Put on entire skin 2-3 times per day. Pt has very dry skin and might use one jar every 1-2 weeks. Refills provided today.    Hydroxyzine 25m every evening, Cetirizine just prn    Prescribed for 10 days oral prednisone, just for the case he would need it = reserve      Staff: : provider    Follow-up in Derm-Allergy clinic in January 2022 (do CBC diff and total IgE before)  ___________________________      I spent a total of 30 minutes with Mike Naidu during today s  visit. This time was spent discussing all the individual test results, correlating them to the clinical relevance, counseling the patient and/or coordinating care

## 2021-10-14 ENCOUNTER — TELEPHONE (OUTPATIENT)
Dept: DERMATOLOGY | Facility: CLINIC | Age: 71
End: 2021-10-14

## 2021-10-14 NOTE — TELEPHONE ENCOUNTER
Reviewed prescriptions written by Dr. Lanier yesterday. [facility] [place] Confirmed that this is where they were e-prescribed to. Patient will call with further questions/concerns.

## 2021-10-14 NOTE — TELEPHONE ENCOUNTER
Prior Authorization Not Needed per Insurance    Medication: Dupixent  Insurance Company:  [employer]  Expected CoPay:      Pharmacy Filling the Rx: [facility] - [place], [address]  Pharmacy Notified:  yes  Patient Notified:  yes

## 2022-02-28 ENCOUNTER — TRANSFERRED RECORDS (OUTPATIENT)
Dept: HEALTH INFORMATION MANAGEMENT | Facility: CLINIC | Age: 72
End: 2022-02-28

## 2022-05-19 LAB
CREATININE (EXTERNAL): 0.9 MG/DL (ref 0.5–1.5)
GFR ESTIMATED (EXTERNAL): >90 ML/MIN/1.73M2
GLUCOSE (EXTERNAL): 92 MG/DL (ref 70–180)
POTASSIUM (EXTERNAL): 4 MMOL/L (ref 3.5–5)

## 2022-06-01 ENCOUNTER — TRANSCRIBE ORDERS (OUTPATIENT)
Dept: OTHER | Age: 72
End: 2022-06-01

## 2022-06-01 DIAGNOSIS — K86.9 DISEASE OF PANCREAS, UNSPECIFIED: Primary | ICD-10-CM

## 2022-06-02 ENCOUNTER — DOCUMENTATION ONLY (OUTPATIENT)
Dept: ONCOLOGY | Facility: CLINIC | Age: 72
End: 2022-06-02

## 2022-06-02 NOTE — PROGRESS NOTES
Action 2022 10:57 AM ABT   Action Taken Image  CT Abd Pel received and resolved to PACS by another user     Action 2022 8:19 AM ABT   Action Taken Image request sent to [facility] for 22 CT Abd Pel     Action 2022 2:42 PM ABT   Action Taken Image reports and imaging disc (for last 5 years of abd images) request has been sent to [facility]

## 2022-06-07 ENCOUNTER — PATIENT OUTREACH (OUTPATIENT)
Dept: ONCOLOGY | Facility: CLINIC | Age: 72
End: 2022-06-07

## 2022-06-07 NOTE — PROGRESS NOTES
New Patient Oncology Nurse Navigator Note     Referring provider: Dr. Osei     Referring Clinic/Organization: [facility] and   [facility]      Referred to: Surgical Oncology -  Hepatobiliary / GI Cancers     Requested provider (if applicable): First available provider    Referral Received: 06/07/22       Evaluation for : IPMN      Clinical History (per Nurse review of records provided):      See book marked documents:       Referring MD office note    Pathology report    Imaging reports       Current Outpatient Medications   Medication Sig Dispense Refill     atorvastatin (LIPITOR) 80 MG tablet        cetirizine (ZYRTEC) 10 MG tablet        doxepin (SINEQUAN) 50 MG capsule        Dupilumab (DUPIXENT) 300 MG/2ML syringe Inject 2 mLs (300 mg) Subcutaneous every 14 days 2 mL 3     emollient (VANICREAM) external cream Apply topically 3 times daily Entire body 453 g 11     emollient (VANICREAM) external cream Apply topically 3 times daily Apply to entire body three times a day to help maintain skin barrier. 453 g 10     emollient (VANICREAM) external cream Put on entire skin 2-3 times per day. Very dry skin and might use one jar every 1-2 weeks. 453 g 8     FLOMAX 0.4 MG capsule        folic acid (FOLVITE) 1 MG tablet        gabapentin (NEURONTIN) 300 MG capsule        hydrOXYzine (ATARAX) 25 MG tablet Take 1 tablet (25 mg) by mouth every evening 30 tablet 3     hydrOXYzine (ATARAX) 25 MG tablet        ketoconazole (NIZORAL) 2 % external shampoo Wash hair every other day 120 mL 3     pantoprazole (PROTONIX) 40 MG EC tablet        predniSONE (DELTASONE) 20 MG tablet Start at 50mg (today and tomorrow morning) and then reduce by 5mg every day ==> therefore after 11 days stop Prednisone  Please provide patient with enough 20mg and 5mg Tabl 22 tablet 0     predniSONE (DELTASONE) 5 MG tablet Take 2 tablets (10 mg) by mouth daily 15 tablet 0     RANITIDINE 150 MAX STRENGTH 150 MG tablet        tacrolimus (PROTOPIC) 0.1  % external ointment Apply topically five times a week From Monday to Friday 2xdaily on itchy red, lesions (might burn initially) 60 g 3     triamcinolone (KENALOG) 0.1 % cream        triamcinolone (KENALOG) 0.1 % external ointment Apply topically twice a week Saturday and Sunday on red, itchy lesions and if acute flare up for 4 days daily 80 g 1     triamcinolone (KENALOG) 0.1 % external ointment If flare ups, use for 3-4 days on itchy, eczematous lesions 1-2 times daily. Otherwise use 2xweekly on remaining lesions 453.6 g 6           Allergies   Allergen Reactions     Procaine Swelling     Sulfa Drugs Swelling and Itching     swelling     Valproic Acid Itching and Rash     hives          There is no problem list on file for this patient.        Clinical Assessment / Barriers to Care (Per Nurse):    None at this time.     Records Location:     [facility] Everywhere   Faxed - Media tab/Scanned     Records Needed:     ABDOMINAL IMAGING (CT SCANS, MRI, US)  DATING BACK TO 2020      Additional testing needed prior to consult:     NONE AT THIS TIME    Referral updates and Plan:       Consult with Surgical Oncology       Anne Ornelas, RN, BSN   Surgical Oncology New Patient Nurse Navigator  [facility] [place]  [phone]

## 2022-06-08 NOTE — TELEPHONE ENCOUNTER
RECORDS STATUS - ALL OTHER DIAGNOSIS      [facility] previously resolved)   DATE RECEIVED: 6/7/22   NOTES STATUS DETAILS   OFFICE NOTE from referring provider [facility] Dr[facility] Miguel Osei ([facility])    [facility] - Media Tab   DISCHARGE REPORT from the ER CE - [facility] 4/11/22   OPERATIVE REPORT  - [facility] 5/4/22: EUS   MEDICATION LIST Media Tab [facility]   LABS     PATHOLOGY REPORTS [facility], Report in CE 5/4/22: [id number]   ANYTHING RELATED TO DIAGNOSIS Epic/CE 5/4/22   IMAGING (NEED IMAGES & REPORT)     CT SCANS PACS 2/28/22, 9/15/21: [facility]    4/11/22: [facility]   XR PACS 12/6/21: [facility]

## 2022-07-07 ENCOUNTER — TRANSFERRED RECORDS (OUTPATIENT)
Dept: HEALTH INFORMATION MANAGEMENT | Facility: CLINIC | Age: 72
End: 2022-07-07

## 2022-07-07 LAB
CREATININE (EXTERNAL): 0.9 MG/DL (ref 0.5–1.5)
GFR ESTIMATED (EXTERNAL): >90 ML/MIN/1.73M2
GLUCOSE (EXTERNAL): 106 MG/DL (ref 70–180)
POTASSIUM (EXTERNAL): 3.5 MMOL/L (ref 3.5–5)

## 2022-07-08 ENCOUNTER — PRE VISIT (OUTPATIENT)
Dept: SURGERY | Facility: CLINIC | Age: 72
End: 2022-07-08

## 2022-07-28 NOTE — CONFIDENTIAL NOTE
RECORDS STATUS - ALL OTHER DIAGNOSIS    Disease of pancreas  RECORDS RECEIVED FROM: [facility]/ [facility]    DATE RECEIVED: 7/29/2022    Action    Action Taken 7/28/2022 3:10pm TARUN   I sent a second STAT request for records to the [facility] Medical Records Dept     I called the [facility] Ph: [phone]- I was transferred to Medical Records.. unavailable.     7/29/2022 9:17AM KEPATRICK   I called the [facility] again Ph: [phone] #0 - unavailable. I tried calling again... still no luck. I faxed over another request for records.     12:16pm TARUN   [facility] and emailed records to nurse hayden team      NOTES STATUS DETAILS   OFFICE NOTE from referring provider Complete Dr. Gualberto Fishman @ [facility]   Ph: [phone] Fx: [phone]      DISCHARGE SUMMARY from hospital     DISCHARGE REPORT from the ER     OPERATIVE REPORT Complete See Biopsy in EPIC   MEDICATION LIST Complete [facility]   CLINICAL TRIAL TREATMENTS TO DATE     LABS     PATHOLOGY REPORTS  A. STOMACH, BIOPSY 5/4/2022  [facility]  --ANTRAL AND BODY-TYPE GASTRIC MUCOSA WITH REACTIVE GASTROPATHY WITH CHRONIC INFLAMMATION  --NO HELICOBACTER PYLORI IDENTIFIED    ANYTHING RELATED TO DIAGNOSIS Complete Labs last updated on 4/29/2022    GENONOMIC TESTING     TYPE:     IMAGING (NEED IMAGES & REPORT)     CT SCANS Complete CT Abdomen Pelvis 4/11/2022   MRI     MAMMO     ULTRASOUND     PET

## 2022-07-29 ENCOUNTER — OFFICE VISIT (OUTPATIENT)
Dept: SURGERY | Facility: CLINIC | Age: 72
End: 2022-07-29
Attending: INTERNAL MEDICINE
Payer: COMMERCIAL

## 2022-07-29 ENCOUNTER — PRE VISIT (OUTPATIENT)
Dept: SURGERY | Facility: CLINIC | Age: 72
End: 2022-07-29

## 2022-07-29 VITALS
SYSTOLIC BLOOD PRESSURE: 152 MMHG | WEIGHT: 152 LBS | OXYGEN SATURATION: 100 % | DIASTOLIC BLOOD PRESSURE: 74 MMHG | HEART RATE: 62 BPM | TEMPERATURE: 97.6 F

## 2022-07-29 DIAGNOSIS — K86.9 DISEASE OF PANCREAS, UNSPECIFIED: ICD-10-CM

## 2022-07-29 DIAGNOSIS — K86.2 PANCREAS CYST: Primary | ICD-10-CM

## 2022-07-29 PROCEDURE — G0463 HOSPITAL OUTPT CLINIC VISIT: HCPCS

## 2022-07-29 PROCEDURE — 99202 OFFICE O/P NEW SF 15 MIN: CPT | Performed by: SURGERY

## 2022-07-29 RX ORDER — LANOLIN ALCOHOL/MO/W.PET/CERES
100 CREAM (GRAM) TOPICAL
COMMUNITY
Start: 2022-06-09

## 2022-07-29 RX ORDER — CALCIUM POLYCARBOPHIL 625 MG
TABLET ORAL
COMMUNITY
Start: 2022-05-19 | End: 2023-05-20

## 2022-07-29 RX ORDER — LIDOCAINE 50 MG/G
PATCH TOPICAL
COMMUNITY
Start: 2022-05-17

## 2022-07-29 RX ORDER — BISACODYL 10 MG
SUPPOSITORY, RECTAL RECTAL
COMMUNITY
Start: 2022-05-06

## 2022-07-29 RX ORDER — LIDOCAINE HYDROCHLORIDE 20 MG/ML
JELLY TOPICAL
COMMUNITY
Start: 2022-05-05

## 2022-07-29 RX ORDER — LIDOCAINE 50 MG/G
OINTMENT TOPICAL
COMMUNITY
Start: 2022-05-17

## 2022-07-29 RX ORDER — FUROSEMIDE 20 MG
TABLET ORAL
COMMUNITY
Start: 2022-07-09

## 2022-07-29 RX ORDER — CETIRIZINE HYDROCHLORIDE 10 MG/1
TABLET ORAL
COMMUNITY

## 2022-07-29 RX ORDER — CYANOCOBALAMIN (VITAMIN B-12) 1000 MCG
TABLET ORAL
COMMUNITY
Start: 2022-07-14

## 2022-07-29 RX ORDER — BACLOFEN 10 MG/1
TABLET ORAL
COMMUNITY
Start: 2022-05-31

## 2022-07-29 ASSESSMENT — PAIN SCALES - GENERAL: PAINLEVEL: MODERATE PAIN (5)

## 2022-07-29 NOTE — NURSING NOTE
Oncology Rooming Note    July 29, 2022 2:21 PM   Mike Naidu is a 72 year old male who presents for:    Chief Complaint   Patient presents with     Oncology Clinic Visit     Disease of pancreas     Initial Vitals: BP (!) 152/74   Pulse 62   Temp 97.6  F (36.4  C) (Oral)   Wt 68.9 kg (152 lb)   SpO2 100%  There is no height or weight on file to calculate BMI. There is no height or weight on file to calculate BSA.  Moderate Pain (5) Comment: Data Unavailable   No LMP for male patient.  Allergies reviewed: Yes  Medications reviewed: Yes    Medications: Medication refills not needed today.  Pharmacy name entered into [employer]:    [facility] - [place], [place] - [address] [facility] - [place], [place] - [address] [facility] [place], [place] - [address]  [facility] LOCAL [facility]INT    Clinical concerns: none       Shaina Buckner CMA

## 2022-07-29 NOTE — PROGRESS NOTES
Surgical Oncology - New Patient  7/29/2022    72 M w/ ~4 cm pancreatic head cyst clinically consistent with side branch IPMN.  This was found on CT scan done for other reasons and was subsequently evaluated using EUS, which showed a septated cyst with main pancreatic duct communication, but no mural nodules, external cyst wall thickening, or main pancreatic duct dilation.  The fluid was not sampled as it was felt to be consistent with side branch IPMN containing a worrisome feature.  The patient now presents for recommendations on management.  Prior imaging and EUS were done in May 2022 and before.    Of Note: The patient essentially spends most of his time in a wheelchair, using a walker, and an electric cart.  His strength has suffered and he has poor balance.  The walking issues are leftover symptoms following spinal cord surgery that he had in the past.  He self caths as well.  Current ECOG is at least 3.  No major cardiac or pulmonary problems.    BP (!) 152/74   Pulse 62   Temp 97.6  F (36.4  C) (Oral)   Wt 68.9 kg (152 lb)   SpO2 100%     CTAP (2/28/2022): IMPRESSION:  1. Overall, no substantial change in size of the pancreatic uncinate process cystic lesion with multiple thin septations measuring 3.3 cm.  Peripheral calcification seen.  No wall thickening or mural nodules.    CTAP (4/11/2022): IMPRESSION:   1. No definite CT evidence for an acute infectious or inflammatory process.   2. Cystic changes in the uncinate process of the pancreas with adjacent coarse calcification.  Outside prior CT was performed in a dynamic fashion with contrast presumably to evaluate this area.  Please see that report for comment. Given the prior examinations that I have available, at the very least follow-up CT, pancreatic protocol would be recommended in 6-12 months.   3. Other incidental findings as above.    EUS (5/4/2022): IMPRESSION:              EGD   - Normal esophagus.   - Erosive gastropathy with no bleeding and  no stigmata of recent bleeding. Biopsied.   EUS   - A cystic lesion was seen in the uncinate process of the pancreas. Tissue has not been obtained. However, the endosonographic appearance is highly suspicious for a branched intraductal papillary mucinous neoplasm. This cyst measured 32 mm in greatest diameter, but the actual size should be taken from cross-sectional imaging. The cyst had thin and thick septations without mural nodules. the PD was 2 mm upstream and was not dilated. I was able to see communication with the cyst, suggesting the cyst to be true SB-IPMN. Given findings of ulceration, erosions, blanching of gastric mucosa, as well as suggestive endosonographic diagnosis of SB-IPMN (few thick septations, with size >3 cm) i did not sample since MCN is unlikely, and pseudocyst is unlikey.   - There was no sign of significant pathology in the genu of the pancreas, pancreatic body and pancreatic tail.    Assessment/Plan:  72 M w/ 3-4 cm likely intermediate risk side branch IPMN with 1-2 worrisome features.  It has been relatively stable in size on outside imaging.  It is located in the pancreatic uncinate process.  The patient has significant physical limitations that have not improved.  He has poor balance and relatively poor functional status.  We discussed his likely diagnosis and that the cysts harbor risk of malignant degeneration or harboring occult malignancy.  Surgery is ideal management for intermediate and high risk cysts in patients who are fit for surgery.  In him, I discussed that I do not think that he would tolerate a Whipple and that the risk is highly likely to outweigh the benefit in his current condition.  I think ongoing surveillance is reasonable if he can improve physically to the point of being able to tolerate surgery, but it is unclear if this will ever happen.  I will reach out to Dr. Osei regarding ongoing surveillance.  Questions were answered and the patient was in agreement  with and understanding of the plan.    A total of 25 minutes were spent on this encounter including 50% in face to face time and the remainder in imaging review, chart review, documentation, and coordination of care.

## 2022-09-06 ENCOUNTER — OFFICE VISIT (OUTPATIENT)
Dept: ALLERGY | Facility: CLINIC | Age: 72
End: 2022-09-06
Payer: COMMERCIAL

## 2022-09-06 DIAGNOSIS — L20.89 OTHER ATOPIC DERMATITIS: Primary | ICD-10-CM

## 2022-09-06 PROCEDURE — 99213 OFFICE O/P EST LOW 20 MIN: CPT | Performed by: DERMATOLOGY

## 2022-09-06 ASSESSMENT — PAIN SCALES - GENERAL: PAINLEVEL: NO PAIN (0)

## 2022-09-06 NOTE — NURSING NOTE
"Dermatology Rooming Note    Mike Naidu's goals for this visit include:   Chief Complaint   Patient presents with     RECHECK     Dermatitis - \"It's doing okay.\"     Brittni Collier, CMA  "

## 2022-09-06 NOTE — LETTER
9/6/2022         RE: Mike Naidu  [id number] [facility] [address]        Dear Colleague,    Thank you for referring your patient, Mike Naidu, to the [facility] [place]. Please see a copy of my visit note below.    [facility] Dermato-allergology Note  Office visit  Encounter Date: Sep 6, 2022  ____________________________________________    CC: No chief complaint on file.      HPI:  (Sep 6, 2022)  Mr. Mike Naidu is a(n) 72 year old male who presents today as a return patient for allergy consultation  - Follow-up in Derm-Allergy clinic in January 2022 (do CBC diff and total IgE before) ==> last seen 10/12/2021  - patient had spinal cord surgery and now here with scooter (problems to walk)  - Felt initially after 2 weeks without Dupixent a flare up of itching and rash, but now not anymore these flare ups.  - otherwise feeling well in usual state of health    Physical exam:  General: In no acute distress, well-developed, well-nourished  Eyes: no conjunctivitis  ENT: no signs of rhinitis   Pulmonary: no wheezing or coughing  Skin:Focused examination of the skin on test sites was performed = see test results below  In the moment skin well controlled on Dupixent every 2 weeks. Just some erythema and desqumation over the arms and elbows and maybe the legs and abdomen. Back clear.    Earlier History and Allergy exams:  (10/12/21)  - [facility] and the new PCP stopped the Dupixent because of the high WBC. Took patient off Dupixent from April to September. In May rash back with revenge and had severe flare ups that had to be treated with oral steroids (4x over the summer).  - patient had in April/May 2021 a full Lymphoma/Leucemia evaluation with bone marrow and there was no signs for hematologic malignancy. However, high Eosinophils and IgE of more than 4000  - End of September patient was put again on Dupixent. Now 6 weeks  on the Dupixent and   - right after injection for 2-3 days some aggravation of rash, then improvement    Skin:in the moment diffuse erythema on trunk and some lichenification over the neck, but according to patient much better. Some erythema tita ani and intertriginous, but not active    Earlier History and Allergy exams:  01/2021  Patient on Dupixent since October 2018 = 2 years and has on and off attacks of eczemas on the entire body.  Problems start usually few days before next injection of Dupixent and therefore patient still needs the Dupixent.  Did not use the oral prednisone anymore (last time in August 2020)    Previous History of Present Illness:    Patient has been on Dupixent since October 2018 and has dramatic improvement in his skin. Patient continues to report intermittent pruritus, mostly on his bilateral arms, back, and stomach. He receives dupixent every other week (next dose due 7/10). He applies Vanicream at least daily, sometimes more frequently to affected areas. He only applies triamcinolone 0.1% cream to the affected areas as needed.       7/20/20: still with occasional erythematous breakouts, mainly around abdomen and trunk, quite pruritic. Still happening every 3 or 4 days, but before dupixent was far more frequent for 4 years.  Feels the dupixent is definitely helping significantly. Also has pruritis when getting out of the hot shower, and so he limits these. Has not had any triggers he notices. Vanicream helps with breakouts. After dupixent injections, gets briefly inflamed, then at the end of the two weeks dermatitis begins to reappear. Thus, still having slight increase of dermatitis at the end of the two weeks.    Hx since 7/8/19v    Today (10/4/19) the pt comes in today for followup. He reports that he is breaking out. This flare up started 2.5 weeks ago without obvious source. Gaetano thinks this started improving last night. He currently has a widespread red, pruritic rash He continues  the Dupixent injection every 2 weeks with his last injection yesterday. He is no longer taking the Zyrtec.    Hx since 10/4/19:  The patient comes in today (11/29/19) for followup. He reports that he was unable to obtain the medications prescribed at the last visit, notably the prednisone burst.  He states that he has been taking dupixent every 2 weeks and gets flare-ups that are intermittent. States that the symptoms subside after dupixent injection for a time but then he will still get flare-ups.  States that he has not used the triamcinolone cream in the interim due to it being a steroid.   No side effects from the dupixent.  The patient is otherwise feeling well today. There are no other allergy concerns at this time.      Past Medical History:   - No history of eczema in childhood. No history of asthma.     No past surgical history on file.    Social History:  Patient reports that he has been smoking. He has never used smokeless tobacco. He reports current alcohol use. 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     Family History:  Atopic dermatitis in 2 of his grandchildren.     Medications:  Current Outpatient Medications   Medication Sig Dispense Refill     baclofen (LIORESAL) 10 MG tablet        bisacodyl (DULCOLAX) 10 MG suppository        Calcium Polycarbophil (FIBER) 625 MG tablet TAKE ONE TABLET BY MOUTH DAILY AS NEEDED WITH PLENTY OF WATER LOOSE STOOLS       cetirizine (ZYRTEC) 10 MG tablet        cholecalciferol 50 MCG (2000 UT) tablet 50 mcg       Cyanocobalamin (B-12) 1000 MCG TABS        doxepin (SINEQUAN) 50 MG capsule        Dupilumab (DUPIXENT) 300 MG/2ML syringe Inject 2 mLs (300 mg) Subcutaneous every 14 days 2 mL 3     FLOMAX 0.4 MG capsule  (Patient not taking: Reported on 7/29/2022)       furosemide (LASIX) 20 MG tablet        lidocaine (LIDODERM) 5 % patch        lidocaine (XYLOCAINE) 5 % external ointment        Lidocaine HCl Urethral/Mucosal  2 % external gel        pantoprazole (PROTONIX) 40 MG EC tablet        thiamine (B-1) 100 MG tablet 100 mg       Allergies   Allergen Reactions     Procaine Swelling     Sulfa Drugs Swelling and Itching     swelling     Valproic Acid Itching and Rash     hives     Lamotrigine      Zonisamide      Other reaction(s): Eruption of skin, Swelling, Eruption of skin, Swelling, Swelling, Swelling, Nausea and Vomiting, Swelling, Nausea and Vomiting     Colyte Rash     Ibuprofen Rash     Other reaction(s): Eruption of skin     Polyethylene Glycol Rash         Review of Systems:  -Constitutional: Otherwise feeling well today, in usual state of health.  -Skin: As above in HPI. No additional skin concerns.    Physical exam:  Vitals: There were no vitals taken for this visit.  GEN: This is a well developed, well-nourished male in no acute distress, in a pleasant mood.    SKIN: Focused examination of the hands, arms, legs, back, abdomen, and face was performed.  - over abdominal and flank and gluteal area erythema and slight infiltration and as well inner part of tights.  - No clear extensive excoriations  However, remarkable improvement still      Previous Patch Testing for Reference:    Order for PATCH TESTS    [] Outpatient  [] Inpatient: [facility]..../ Bed ....      Skin Atopy (atopic dermatitis) [x] Yes   [] No  Rhinitis/Sinusitis:   [x] Yes   [] No  Allergic Asthma:   [] Yes   [x] No  Food Allergy:   [] Yes   [x] No  Leg ulcers:   [] Yes   [x] No  Hand eczema:   [x] Yes   [] No   Leading hand:   [x] R   [] L       [] Ambidextrous                        Reason for tests (suspected allergy): generalized subacute to chronic eczema with spongiosis and peripheral and skin Eosinophilia  Known previous allergies: Sulphonamides, Valproic acid and Procaine    Standardized panels  [x] Standard panel (40 tests)  [x] Preservatives & Antimicrobials (31 tests)  [x] Emulsifiers & Additives (25 tests)   [] Perfumes/Flavours & Plants (25  tests)  [] Hairdresser panel (12 tests)  [] Rubber Chemicals (22 tests)  [] Plastics (26 tests)  [] Colorants/Dyes/Food additives (20 tests)  [] Metals (implants/dental) (23 tests)  [] Local anaesthetics/NSAIDs (12 tests)  [] Antibiotics & Antimycotics (14 tests)   [x] Corticosteroids (15 tests)   [] Photopatch test (32 tests)   [] others: ...      [] Patient's own products: ...    DO NOT test if chemical or biological identity is unknown!     always ask from patient the product information and safety sheets (MSDS)     [] Patient needs consultation with Allergy team (changes of tests may apply)  [x] Tests discussed with Allergy team (can have direct appointment for patch tests)    RESULTS & EVALUATION of PATCH TESTS    Date/time of application:  Physician/Nurse:  / Felicia Ceballos LPN               Localization of application: Back --> on lower back chronic eczema, but upper back can be used for patch tests. Rather dry there, but no acute eczema    Patch test readings after     [x] 2 days, [] 3 days [x] 4 days, [] 5 days,   Other duration: ...    Applied patch tests with results (import here the list of patch tests):  Date/time of application:10/01/18   Physician/Nurse:  / Felicia Ceballos LPN               Localization of application: Back     STANDARD Series         No Substance 2 days 4 days remarks   1 Jhony Mix [C] - -    2 Colophony - -    3  2-Mercaptobenzothiazole  - -     4 Methylisothiazolinone - -    5 Carba Mix - -    6 Thiurma Mix [A] - -    7 Bisphenol A Epoxy Resin - -    8 Z-Kjbn-Jwjvwblljzi-Formaldehyde Resin - -    9 Mercapto Mix [A] - -    10 Black Rubber Mix- PPD [B] - -    11 Potassium Dichromate  + +/++ 5mm erosion   12 Balsam of [place] (Myroxylon Pereirae Resin) - -    13 Nickel Sulphate Hexahydrate +/++ ++ 14mm erosion   14 Mixed Dialkyl Thiourea - -    15 Paraben Mix [B] (+) + 4mm erosion   16 Methyldibromo Glutaronitrile - + 4mm erosion   17 Fragrance Mix - -    18  2-Bromo-2-Nitropropane-1,3-Diol (Bronopol) - -    19 Lyral - -    20 Tixocortol-21- Pivalate - -    21 Diazolidiyl Urea (Germall II) - -    22 Methyl Methacrylate - -    23 Cobalt (II) Chloride Hexahydrate + -    24 Fragrance Mix II  - -    25 Compositae Mix - -    26 Benzoyl Peroxide - -    27 Bacitracin - -    28 Formaldehyde - -    29 Methylchloroisothiazolinone / Methylisothiazolinone - -    30 Corticosteroid Mix - -    31 Sodium Lauryl Sulfate - -    32 Lanolin Alcohol - -    33 Turpentine - -    34 Cetylstearylalcohol - -    35 Chlorhexidine Dicluconate - -    36 Budenoside - -    37 Imidazolidinyl Urea  - -    38 Ethyl-2 Cyanoacrylate - -    39 Quaternium 15 (Dowicil 200) - -    40 Decyl Glucoside - -      PRESERVATIVES & ANTIMICROBIALS         No Substance 2 days 4 days remarks   41  1,2-Benzisothiazoline-3-One, Sodium Salt - -    42  1,3,5-Antwan (2-Hydroxyethyl) - Hexahydrotriazine (Grotan BK) - -    43 0-Yyfuqgtmrhkpk-5-Nitro-1, 3-Propanediol - -    44  3, 4, 4' - Triclocarban - -    45 4 - Chloro - 3 - Cresol - -    46 4 - Chloro - 4 - Xylenol (PCMX) - -    47 7-Ethylbicyclooxazolidine (Bioban [id number]) - -    48 Benzalkonium Chloride - -    49 Benzyl Alcohol - -    50 Cetalkonium Chloride - -    51 Cetylpyrimidine Chloride  - -    NA Chloroacetamide NA NA    52 DMDM Hydantoin - -    53 Glutaraldehyde - -    54 Triclosan - -    55 Glyoxal Trimeric Dihydrate (+) + 7mm Erosion   56 Iodopropynyl Butylcarbamate - -    57 Octylisothiazoline - -    58 Iodoform - -    59 (Nitrobutyl) Morpholine/(Ethylnitro-Trimethylene) Dimorpholine (Bioban P 1487) - -    60 Phenoxyethanol - -    61 Phenyl Salicylate - -    62 Povidone Iodine - -    63 Sodium Benzoate - -    64 Sodium Disulfite - -    65 Sorbic Acid - -    66 Thimerosal - -     Parabens      67 Butyl-P-Hydroxybenzoate - -    68 Ethyl-P-Hydroxybenzoate - -    69 Methyl-P-Hydroxybenzoate - -    70 Propyl-P-Hydroxybenzoate - -      EMULSIFIERS & ADDITIVES        No  Substance 2 days 4 days remarks   71 Polyethylene Glycol-400 (+) -    72 Cocamidopropyl Betaine - -    73 Amerchol L101 - -    74 Propylene Glycol - -    75 Triethanolamine - -    76 Sorbitane Sesquiolate - -    77 Isopropylmyristate (+) -    78 Polysorbate 80  - -    79 Amidoamine   (Stearamidopropyl Dimethylamine) - -    80 Oleamidopropyl Dimethylamine - -    81 Lauryl Glucoside - -    82 Coconut Diethanolamide  - -    83 2-Hydroxy-4-Methoxy Benzophenone (Oxybenzone) - -    84 Benzophenone-4 (Sulisobenzon) - -    85 Propolis - -    86 Dexpanthenol - -    87 Carboxymethyl Cellulose Sodium - -    88 Abitol - -    89 Tert-Butylhydroquinone - -    90 Benzyl Salicylate - -     Antioxidant      91 Dodecyl Gallate - -    92 Butylhydroxyanisole (BHA) - -    93 Butylhydroxytoluene (BHT) - -    94 Di-Alpha-Tocopherol (Vit E) - -    95 Propyl Gallate - -      CORTICOSTEROIDS    No Substance 2 days 4 days remarks Allergy  class   96 Amcinonide - -  B   97 Betametasone-17,21 Dipropionate - -  D1   98 Desoximetasone - -  C   99 Betamethasone-17-Valerate - -  D1   100 Dexamethasone - -  C   101 Hydrocortisone - -  A   102 Clobetasol-17-Propionate - -  D1   103 Dexamethasone-21-Phosphate Disodium Salt - -  C   104 Hydrocortisone-17 Butyrate - -  D2   105 Prednisolone - -  A   106 Mometason Furoate - -  D1   107 Triamcinolone Acetonide - -  B   108 Methylprednisolone Aceponate - -  D2   109 Hydrocortisone-21-Acetate - -  A   110 Prednicarbate - -  D2       Group Characteristics of group Generic name Name  cross reactions   A Hydrocortisone   Cloprednole, Fludrocortisone acétate, Hydrocortison acetate, Methylprednisolone, Prednisolone, Tixocortolpivalate Alfacortone, Fucidin H, Dermacalm, Hexacortone, Premandole, Imacort With group D2   B Triamcinolone-acetonide   Budenoside (R-isomer), Amcinonide, Desonide, Fluocinolone acetonide, Triamcinolone acetonide Locapred, Locatop  Synalar, Pevisone, [place] -   C Betamethasone (Without  Romina)   Betamethasone, Dexamethasone, Flumethasone pivalate, Halomethasone Daivobet, Dexasalyl, Locasalen,   -   D1 Betamethasone-diproprionate   Betamethasone dipropionate, Betamethasone-17-valerate, Clobetasole-propionate, Fluticasone propionate, Mometasone furoate Betnovate, Diprogenta, Diprosalic, Diprosone, Celestoderm, Fucicort,  Cutivate, Axotide, Elocom -   D2 Methylprednisolone-aceponate   Hydrocortisone-aceponate, Hydrocortisone-buteprate, Hydrocortisone-17-butyrate, Methylprednisolone aceponate, Prednicarbate Locoïd, Advantan,  Prednitop With group A and Budesonide (S-isomer)     Results of patch tests:                         Interpretation:    - Negative                    A    = Allergic      (+) Erythema    TI   = Toxic/irritant   + E + Infiltration    RaP = Relevance at Present     ++ E/I + Papulovesicle   Rpr  = Relevance Previously     +++ E/I/P + Blister     nR   = No Relevance    Atopy Screen (07/08/2019)    No Substance Readings (15min) Evaluation   POS Histamine 1mg/ml ++    NEG NaCl 0.9% -      No Substance Readings (15min) Evaluation   1 Alternaria alternata (tenuis)  -    2 Cladosporium herbarum -    3 Aspergillus fumigatus -    4 Penicillium notatum -    5 Dermatophagoides pteronyssinus +++    6 Dermatophagoides farinae -    7 Dog epithelium (canis spp) -    8 Cat hair (steve catus) -    9 Cockroach   (Blatella americana & germanica) -    10 Grass mix [facility]   (Noris, Orchard, Redtop, Brayden) -    11  grass (sorghum halepense) -    12 Weed mix   (common Cocklebur, Lamb s quarters, rough redroot Pigweed, Dock/Sorrel) -    13 Mugwort (artemisia vulgare) -    14 Ragweed giant/short (ambrosia spp) -    15 English Plantain (plantago lanceolata) -    16 Tree mix 1 (Pecan, Maple BHR, Oak RVW, american Capron, black [place]) NA    17 Red cedar (juniperus virginia) -    18 Tree mix 2   (white Dirk, river/red Birch, black Lomita, common [place]and, american Elm) NA    19 Box elder/Maple mix  (acer spp) -    20 [place] shagbark (carya ovata) -       -    Conclusions: Patient is atopic with clear immediate type reaction to D. Pteronyssinus (house dust mite). Patient should observe the molds and house dust mite areas and report back with photos if any delayed reaction there       [x] Allergic reaction diagnosed against: against metals (Cobalt, Nickel and chromate) and as well angry back --> could go well with atopic dermatitis   ==> the reactions are all erosions and not typical allergic, infiltrated lesions.     Interpretation/ remarks:   First of all, after removal patient has angry back (see photo). Based on that the results are difficult to obtain. However, all the reactions were well defined erosions without infiltration and therefore rather irritant reactions with underlying atopic dermatitis than real allergic reactions.  The only real reaction seems to be to the metals, particularly Nickel, which would go well with atopic dermatitis      Assessment & Plan:    ==> Final Diagnosis:     #  Severe exantematic, generalized Atopic Dermatitis: Atopy skin prick test on 7/8/2019 with clear immediate type reaction to D. Pteronyssinus (house dust mite).   * chronic illness with exacerbation, progression, side effects from treatment    Has been on Dupixent since 10/17/18 300mg every other week and with this treatment skin under control    Use gentle free and clear soaps and shampoos  ==> atopic predisposition with clinically relevant sensitization to house dust mites (D. Pteronyssinus) with Rhinitis entire day = given infos for house dust mite reduction    # Seborrheic Dermatitis   * chronic illness with exacerbation, progression, side effects from treatment  - Start ketoconazole 2% shampoo - apply three times a week     Follow-up in 6-12 months unless [facility], then [facility] up with the [facility] preferred [facility].      These conclusions are made at the best of one's knowledge and  belief based on the provided evidence such as patient's history and allergy test results and they can change over time or can be incomplete because of missing information's.    ==> Treatment Plan:      Continue with every 2-3 weeks Dupixent. Maybe stay for the moment on every 2 weeks and only if patient fells confident can try to reduce to every 3 weeks.    Continue Vanicream Cream. Put on entire skin 2-3 times per day. Pt has very dry skin and might use one jar every 1-2 weeks. Refills provided today.    ___________________________       Staff: : provider      Follow-up in Derm-Allergy clinic in 8-12months  ___________________________    I spent a total of 20 minutes with Mike Naidu during today s  visit. This time was spent discussing all the individual test results, correlating them to the clinical relevance, counseling the patient and/or coordinating care            Again, thank you for allowing me to participate in the care of your patient.        Sincerely,        Leon Lanier MD

## 2022-09-06 NOTE — PROGRESS NOTES
[facility] Dermato-allergology Note  Office visit  Encounter Date: Sep 6, 2022  ____________________________________________    CC: No chief complaint on file.      HPI:  (Sep 6, 2022)  Mr. Mike Naidu is a(n) 72 year old male who presents today as a return patient for allergy consultation  - Follow-up in Derm-Allergy clinic in January 2022 (do CBC diff and total IgE before) ==> last seen 10/12/2021  - patient had spinal cord surgery and now here with scooter (problems to walk)  - Felt initially after 2 weeks without Dupixent a flare up of itching and rash, but now not anymore these flare ups.  - otherwise feeling well in usual state of health    Physical exam:  General: In no acute distress, well-developed, well-nourished  Eyes: no conjunctivitis  ENT: no signs of rhinitis   Pulmonary: no wheezing or coughing  Skin:Focused examination of the skin on test sites was performed = see test results below  In the moment skin well controlled on Dupixent every 2 weeks. Just some erythema and desqumation over the arms and elbows and maybe the legs and abdomen. Back clear.    Earlier History and Allergy exams:  (10/12/21)  - [facility] and the new PCP stopped the Dupixent because of the high WBC. Took patient off Dupixent from April to September. In May rash back with revenge and had severe flare ups that had to be treated with oral steroids (4x over the summer).  - patient had in April/May 2021 a full Lymphoma/Leucemia evaluation with bone marrow and there was no signs for hematologic malignancy. However, high Eosinophils and IgE of more than 4000  - End of September patient was put again on Dupixent. Now 6 weeks on the Dupixent and   - right after injection for 2-3 days some aggravation of rash, then improvement    Skin:in the moment diffuse erythema on trunk and some lichenification over the neck, but according to patient much better. Some erythema tita ani and intertriginous, but  not active    Earlier History and Allergy exams:  01/2021  Patient on Dupixent since October 2018 = 2 years and has on and off attacks of eczemas on the entire body.  Problems start usually few days before next injection of Dupixent and therefore patient still needs the Dupixent.  Did not use the oral prednisone anymore (last time in August 2020)    Previous History of Present Illness:    Patient has been on Dupixent since October 2018 and has dramatic improvement in his skin. Patient continues to report intermittent pruritus, mostly on his bilateral arms, back, and stomach. He receives dupixent every other week (next dose due 7/10). He applies Vanicream at least daily, sometimes more frequently to affected areas. He only applies triamcinolone 0.1% cream to the affected areas as needed.       7/20/20: still with occasional erythematous breakouts, mainly around abdomen and trunk, quite pruritic. Still happening every 3 or 4 days, but before dupixent was far more frequent for 4 years.  Feels the dupixent is definitely helping significantly. Also has pruritis when getting out of the hot shower, and so he limits these. Has not had any triggers he notices. Vanicream helps with breakouts. After dupixent injections, gets briefly inflamed, then at the end of the two weeks dermatitis begins to reappear. Thus, still having slight increase of dermatitis at the end of the two weeks.    Hx since 7/8/19v    Today (10/4/19) the pt comes in today for followup. He reports that he is breaking out. This flare up started 2.5 weeks ago without obvious source. Gaetano thinks this started improving last night. He currently has a widespread red, pruritic rash He continues the Dupixent injection every 2 weeks with his last injection yesterday. He is no longer taking the Zyrtec.    Hx since 10/4/19:  The patient comes in today (11/29/19) for followup. He reports that he was unable to obtain the medications prescribed at the last visit, notably  the prednisone burst.  He states that he has been taking dupixent every 2 weeks and gets flare-ups that are intermittent. States that the symptoms subside after dupixent injection for a time but then he will still get flare-ups.  States that he has not used the triamcinolone cream in the interim due to it being a steroid.   No side effects from the dupixent.  The patient is otherwise feeling well today. There are no other allergy concerns at this time.      Past Medical History:   - No history of eczema in childhood. No history of asthma.     No past surgical history on file.    Social History:  Patient reports that he has been smoking. He has never used smokeless tobacco. He reports current alcohol use. 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     Family History:  Atopic dermatitis in 2 of his grandchildren.     Medications:  Current Outpatient Medications   Medication Sig Dispense Refill     baclofen (LIORESAL) 10 MG tablet        bisacodyl (DULCOLAX) 10 MG suppository        Calcium Polycarbophil (FIBER) 625 MG tablet TAKE ONE TABLET BY MOUTH DAILY AS NEEDED WITH PLENTY OF WATER LOOSE STOOLS       cetirizine (ZYRTEC) 10 MG tablet        cholecalciferol 50 MCG (2000 UT) tablet 50 mcg       Cyanocobalamin (B-12) 1000 MCG TABS        doxepin (SINEQUAN) 50 MG capsule        Dupilumab (DUPIXENT) 300 MG/2ML syringe Inject 2 mLs (300 mg) Subcutaneous every 14 days 2 mL 3     FLOMAX 0.4 MG capsule  (Patient not taking: Reported on 7/29/2022)       furosemide (LASIX) 20 MG tablet        lidocaine (LIDODERM) 5 % patch        lidocaine (XYLOCAINE) 5 % external ointment        Lidocaine HCl Urethral/Mucosal 2 % external gel        pantoprazole (PROTONIX) 40 MG EC tablet        thiamine (B-1) 100 MG tablet 100 mg       Allergies   Allergen Reactions     Procaine Swelling     Sulfa Drugs Swelling and Itching     swelling     Valproic Acid Itching and Rash     hives      Lamotrigine      Zonisamide      Other reaction(s): Eruption of skin, Swelling, Eruption of skin, Swelling, Swelling, Swelling, Nausea and Vomiting, Swelling, Nausea and Vomiting     Colyte Rash     Ibuprofen Rash     Other reaction(s): Eruption of skin     Polyethylene Glycol Rash         Review of Systems:  -Constitutional: Otherwise feeling well today, in usual state of health.  -Skin: As above in HPI. No additional skin concerns.    Physical exam:  Vitals: There were no vitals taken for this visit.  GEN: This is a well developed, well-nourished male in no acute distress, in a pleasant mood.    SKIN: Focused examination of the hands, arms, legs, back, abdomen, and face was performed.  - over abdominal and flank and gluteal area erythema and slight infiltration and as well inner part of tights.  - No clear extensive excoriations  However, remarkable improvement still      Previous Patch Testing for Reference:    Order for PATCH TESTS    [] Outpatient  [] Inpatient: [facility]..../ Bed ....      Skin Atopy (atopic dermatitis) [x] Yes   [] No  Rhinitis/Sinusitis:   [x] Yes   [] No  Allergic Asthma:   [] Yes   [x] No  Food Allergy:   [] Yes   [x] No  Leg ulcers:   [] Yes   [x] No  Hand eczema:   [x] Yes   [] No   Leading hand:   [x] R   [] L       [] Ambidextrous                        Reason for tests (suspected allergy): generalized subacute to chronic eczema with spongiosis and peripheral and skin Eosinophilia  Known previous allergies: Sulphonamides, Valproic acid and Procaine    Standardized panels  [x] Standard panel (40 tests)  [x] Preservatives & Antimicrobials (31 tests)  [x] Emulsifiers & Additives (25 tests)   [] Perfumes/Flavours & Plants (25 tests)  [] Hairdresser panel (12 tests)  [] Rubber Chemicals (22 tests)  [] Plastics (26 tests)  [] Colorants/Dyes/Food additives (20 tests)  [] Metals (implants/dental) (23 tests)  [] Local anaesthetics/NSAIDs (12 tests)  [] Antibiotics & Antimycotics (14 tests)   [x]  Corticosteroids (15 tests)   [] Photopatch test (32 tests)   [] others: ...      [] Patient's own products: ...    DO NOT test if chemical or biological identity is unknown!     always ask from patient the product information and safety sheets (MSDS)     [] Patient needs consultation with Allergy team (changes of tests may apply)  [x] Tests discussed with Allergy team (can have direct appointment for patch tests)    RESULTS & EVALUATION of PATCH TESTS    Date/time of application:  Physician/Nurse:  / Felicia Ceballos LPN               Localization of application: Back --> on lower back chronic eczema, but upper back can be used for patch tests. Rather dry there, but no acute eczema    Patch test readings after     [x] 2 days, [] 3 days [x] 4 days, [] 5 days,   Other duration: ...    Applied patch tests with results (import here the list of patch tests):  Date/time of application:10/01/18   Physician/Nurse: Dr.Bigliardi Casie Ceballos LPN               Localization of application: Back     STANDARD Series         No Substance 2 days 4 days remarks   1 Jhony Mix [C] - -    2 Colophony - -    3  2-Mercaptobenzothiazole  - -     4 Methylisothiazolinone - -    5 Carba Mix - -    6 Thiurma Mix [A] - -    7 Bisphenol A Epoxy Resin - -    8 D-Boys-Bsqoudzlyli-Formaldehyde Resin - -    9 Mercapto Mix [A] - -    10 Black Rubber Mix- PPD [B] - -    11 Potassium Dichromate  + +/++ 5mm erosion   12 Balsam of [place] (Myroxylon Pereirae Resin) - -    13 Nickel Sulphate Hexahydrate +/++ ++ 14mm erosion   14 Mixed Dialkyl Thiourea - -    15 Paraben Mix [B] (+) + 4mm erosion   16 Methyldibromo Glutaronitrile - + 4mm erosion   17 Fragrance Mix - -    18 2-Bromo-2-Nitropropane-1,3-Diol (Bronopol) - -    19 Lyral - -    20 Tixocortol-21- Pivalate - -    21 Diazolidiyl Urea (Germall II) - -    22 Methyl Methacrylate - -    23 Cobalt (II) Chloride Hexahydrate + -    24 Fragrance Mix II  - -    25 Compositae Mix - -    26  Benzoyl Peroxide - -    27 Bacitracin - -    28 Formaldehyde - -    29 Methylchloroisothiazolinone / Methylisothiazolinone - -    30 Corticosteroid Mix - -    31 Sodium Lauryl Sulfate - -    32 Lanolin Alcohol - -    33 Turpentine - -    34 Cetylstearylalcohol - -    35 Chlorhexidine Dicluconate - -    36 Budenoside - -    37 Imidazolidinyl Urea  - -    38 Ethyl-2 Cyanoacrylate - -    39 Quaternium 15 (Dowicil 200) - -    40 Decyl Glucoside - -      PRESERVATIVES & ANTIMICROBIALS         No Substance 2 days 4 days remarks   41  1,2-Benzisothiazoline-3-One, Sodium Salt - -    42  1,3,5-Antwan (2-Hydroxyethyl) - Hexahydrotriazine (Grotan BK) - -    43 1-Zpyutxezkutbs-1-Nitro-1, 3-Propanediol - -    44  3, 4, 4' - Triclocarban - -    45 4 - Chloro - 3 - Cresol - -    46 4 - Chloro - 4 - Xylenol (PCMX) - -    47 7-Ethylbicyclooxazolidine (Lambert Contractsan [id number]) - -    48 Benzalkonium Chloride - -    49 Benzyl Alcohol - -    50 Cetalkonium Chloride - -    51 Cetylpyrimidine Chloride  - -    NA Chloroacetamide NA NA    52 DMDM Hydantoin - -    53 Glutaraldehyde - -    54 Triclosan - -    55 Glyoxal Trimeric Dihydrate (+) + 7mm Erosion   56 Iodopropynyl Butylcarbamate - -    57 Octylisothiazoline - -    58 Iodoform - -    59 (Nitrobutyl) Morpholine/(Ethylnitro-Trimethylene) Dimorpholine (Bioban P 1487) - -    60 Phenoxyethanol - -    61 Phenyl Salicylate - -    62 Povidone Iodine - -    63 Sodium Benzoate - -    64 Sodium Disulfite - -    65 Sorbic Acid - -    66 Thimerosal - -     Parabens      67 Butyl-P-Hydroxybenzoate - -    68 Ethyl-P-Hydroxybenzoate - -    69 Methyl-P-Hydroxybenzoate - -    70 Propyl-P-Hydroxybenzoate - -      EMULSIFIERS & ADDITIVES        No Substance 2 days 4 days remarks   71 Polyethylene Glycol-400 (+) -    72 Cocamidopropyl Betaine - -    73 Amerchol L101 - -    74 Propylene Glycol - -    75 Triethanolamine - -    76 Sorbitane Sesquiolate - -    77 Isopropylmyristate (+) -    78 Polysorbate 80  - -    79  Amidoamine   (Stearamidopropyl Dimethylamine) - -    80 Oleamidopropyl Dimethylamine - -    81 Lauryl Glucoside - -    82 Coconut Diethanolamide  - -    83 2-Hydroxy-4-Methoxy Benzophenone (Oxybenzone) - -    84 Benzophenone-4 (Sulisobenzon) - -    85 Propolis - -    86 Dexpanthenol - -    87 Carboxymethyl Cellulose Sodium - -    88 Abitol - -    89 Tert-Butylhydroquinone - -    90 Benzyl Salicylate - -     Antioxidant      91 Dodecyl Gallate - -    92 Butylhydroxyanisole (BHA) - -    93 Butylhydroxytoluene (BHT) - -    94 Di-Alpha-Tocopherol (Vit E) - -    95 Propyl Gallate - -      CORTICOSTEROIDS    No Substance 2 days 4 days remarks Allergy  class   96 Amcinonide - -  B   97 Betametasone-17,21 Dipropionate - -  D1   98 Desoximetasone - -  C   99 Betamethasone-17-Valerate - -  D1   100 Dexamethasone - -  C   101 Hydrocortisone - -  A   102 Clobetasol-17-Propionate - -  D1   103 Dexamethasone-21-Phosphate Disodium Salt - -  C   104 Hydrocortisone-17 Butyrate - -  D2   105 Prednisolone - -  A   106 Mometason Furoate - -  D1   107 Triamcinolone Acetonide - -  B   108 Methylprednisolone Aceponate - -  D2   109 Hydrocortisone-21-Acetate - -  A   110 Prednicarbate - -  D2       Group Characteristics of group Generic name Name  cross reactions   A Hydrocortisone   Cloprednole, Fludrocortisone acétate, Hydrocortison acetate, Methylprednisolone, Prednisolone, Tixocortolpivalate Alfacortone, Fucidin H, Dermacalm, Hexacortone, Premandole, Imacort With group D2   B Triamcinolone-acetonide   Budenoside (R-isomer), Amcinonide, Desonide, Fluocinolone acetonide, Triamcinolone acetonide Locapred, Locatop  Synalar, Pevisone, [place] -   C Betamethasone (Without Romina)   Betamethasone, Dexamethasone, Flumethasone pivalate, Halomethasone Daivobet, Dexasalyl, Locasalen,   -   D1 Betamethasone-diproprionate   Betamethasone dipropionate, Betamethasone-17-valerate, Clobetasole-propionate, Fluticasone propionate, Mometasone furoate  Betnovate, Diprogenta, Diprosalic, Diprosone, Celestoderm, Fucicort,  Cutivate, Axotide, Elocom -   D2 Methylprednisolone-aceponate   Hydrocortisone-aceponate, Hydrocortisone-buteprate, Hydrocortisone-17-butyrate, Methylprednisolone aceponate, Prednicarbate Locoïd, Advantan,  Prednitop With group A and Budesonide (S-isomer)     Results of patch tests:                         Interpretation:    - Negative                    A    = Allergic      (+) Erythema    TI   = Toxic/irritant   + E + Infiltration    RaP = Relevance at Present     ++ E/I + Papulovesicle   Rpr  = Relevance Previously     +++ E/I/P + Blister     nR   = No Relevance    Atopy Screen (07/08/2019)    No Substance Readings (15min) Evaluation   POS Histamine 1mg/ml ++    NEG NaCl 0.9% -      No Substance Readings (15min) Evaluation   1 Alternaria alternata (tenuis)  -    2 Cladosporium herbarum -    3 Aspergillus fumigatus -    4 Penicillium notatum -    5 Dermatophagoides pteronyssinus +++    6 Dermatophagoides farinae -    7 Dog epithelium (canis spp) -    8 Cat hair (tseve catus) -    9 Cockroach   (Blatella americana & germanica) -    10 Grass mix midwest   (Noris, Orchard, Redtop, Brayden) -    11  grass (sorghum halepense) -    12 Weed mix   (common Cocklebur, Lamb s quarters, rough redroot Pigweed, Dock/Sorrel) -    13 Mugwort (artemisia vulgare) -    14 Ragweed giant/short (ambrosia spp) -    15 English Plantain (plantago lanceolata) -    16 Tree mix 1 (Pecan, Maple BHR, Oak RVW, american [place], black [place]) NA    17 Red cedar (juniperus virginia) -    18 Tree mix 2   (white Dirk, river/red Birch, black [place], common [place], american Elm) NA    19 Box elder/Maple mix (acer spp) -    20 [place] shagbark (carya ovata) -       -    Conclusions: Patient is atopic with clear immediate type reaction to D. Pteronyssinus (house dust mite). Patient should observe the molds and house dust mite areas and report back with photos if any delayed  reaction there       [x] Allergic reaction diagnosed against: against metals (Cobalt, Nickel and chromate) and as well angry back --> could go well with atopic dermatitis   ==> the reactions are all erosions and not typical allergic, infiltrated lesions.     Interpretation/ remarks:   First of all, after removal patient has angry back (see photo). Based on that the results are difficult to obtain. However, all the reactions were well defined erosions without infiltration and therefore rather irritant reactions with underlying atopic dermatitis than real allergic reactions.  The only real reaction seems to be to the metals, particularly Nickel, which would go well with atopic dermatitis      Assessment & Plan:    ==> Final Diagnosis:     #  Severe exantematic, generalized Atopic Dermatitis: Atopy skin prick test on 7/8/2019 with clear immediate type reaction to D. Pteronyssinus (house dust mite).   * chronic illness with exacerbation, progression, side effects from treatment    Has been on Dupixent since 10/17/18 300mg every other week and with this treatment skin under control    Use gentle free and clear soaps and shampoos  ==> atopic predisposition with clinically relevant sensitization to house dust mites (D. Pteronyssinus) with Rhinitis entire day = given infos for house dust mite reduction    # Seborrheic Dermatitis   * chronic illness with exacerbation, progression, side effects from treatment  - Start ketoconazole 2% shampoo - apply three times a week     Follow-up in 6-12 months unless [facility] prefers [facility], then follow up with the [facility] preferred [facility].      These conclusions are made at the best of one's knowledge and belief based on the provided evidence such as patient's history and allergy test results and they can change over time or can be incomplete because of missing information's.    ==> Treatment Plan:      Continue with every 2-3 weeks Dupixent. Maybe stay for the moment on  every 2 weeks and only if patient fells confident can try to reduce to every 3 weeks.    Continue Vanicream Cream. Put on entire skin 2-3 times per day. Pt has very dry skin and might use one jar every 1-2 weeks. Refills provided today.    ___________________________       Staff: : provider      Follow-up in Derm-Allergy clinic in 8-12months  ___________________________    I spent a total of 20 minutes with Mike Naidu during today s  visit. This time was spent discussing all the individual test results, correlating them to the clinical relevance, counseling the patient and/or coordinating care

## 2022-09-20 ENCOUNTER — TELEPHONE (OUTPATIENT)
Dept: DERMATOLOGY | Facility: CLINIC | Age: 72
End: 2022-09-20

## 2022-09-20 NOTE — TELEPHONE ENCOUNTER
Patient called call center and was transferred to nurse line.    Patient upset that [facility] and [facility] were not [facility]. Informed patient that records were [facility] to [facility]. Informed patient that I could re-fax it if they did not receive them. Patient states he is upset they were not faxed during his appointment. Re-iterated to patient that visit notes were faxed to [facility]. Patient states that is [facility] but whenever he was sent faxes in past they would send it to his new PCP. Asked patient for location, phone number or fax number of [facility] clinic he wants them sent to. Patient states that I should have it all on file and became upset that I asked him for [facility] clinic information. Informed patient that I do not see [facility] clinic information in file and that I can re-fax the notes if he provides me with any information for the clinic so I can get the correct fax number.     Patient refused to provide me with any clinic information and proceeded to ask about his Dupixent medication. Informed patient that it was prescribed on his appointment day but I do not see if it was sent. However, that is not to say that it wasn't. Informed patient that I would let his nursing staff know to re-send it. Patient became aggravated and complained that he needs the medication and he is on his last one tomorrow and he needs it every two weeks so he needs it sent to his pharmacy. Informed patient that I would let Dr. Lanier's nursing staff know and they will re-send it as soon as possible. Patient states he will call his pharmacy in an hour to verify it was sent and hung up. Was unable to inform patient that it may be longer than that. Also, unable to re-send clinic notes without any information for clinic patient wants it sent to.     Marv Justice, EMT

## 2022-09-20 NOTE — TELEPHONE ENCOUNTER
Pt called to find out if everything has been faxed to the [facility] stating Marv said he would be doing this right away. Writer informed pt Marv sent this to Allergy department to handle, pt upset and started yelling to writer, writer stated I would not be arguing with the pt and pt hung up.

## 2022-10-11 ENCOUNTER — TELEPHONE (OUTPATIENT)
Dept: DERMATOLOGY | Facility: CLINIC | Age: 72
End: 2022-10-11

## 2023-01-20 ENCOUNTER — TRANSCRIBE ORDERS (OUTPATIENT)
Dept: OTHER | Age: 73
End: 2023-01-20

## 2023-01-20 DIAGNOSIS — D72.10 EOSINOPHILIA, UNSPECIFIED: Primary | ICD-10-CM

## 2023-01-26 ENCOUNTER — TELEPHONE (OUTPATIENT)
Dept: ALLERGY | Facility: CLINIC | Age: 73
End: 2023-01-26

## 2023-01-26 NOTE — TELEPHONE ENCOUNTER
Form request received from [facility] for last completed visit note.  Faxed to [phone].

## 2023-07-03 NOTE — PROGRESS NOTES
[facility] Dermato-allergology Note  Office visit  Encounter Date: Jul 5, 2023  ____________________________________________    CC: No chief complaint on file.      HPI:  (Jul 5, 2023)  Mr. Mike Naidu is a(n) 73 year old male who presents today as a return patient for allergy consultation  - Patient is still on Dupixent injections (started in October 2018) and skin seems to be very well controlled. In the moment no itching after 2 weeks.  - otherwise feeling well in usual state of health    Physical exam:  General: In no acute distress, well-developed, well-nourished  Eyes: no conjunctivitis  ENT: no signs of rhinitis   Pulmonary: no wheezing or coughing  Skin:in the moment skin without major lesions. Still a little bit dry    Earlier History and Allergy exams:  (Sep 6, 2022)  - Follow-up in Derm-Allergy clinic in January 2022 (do CBC diff and total IgE before) ==> last seen 10/12/2021  - patient had spinal cord surgery and now here with scooter (problems to walk)  - Felt initially after 2 weeks without Dupixent a flare up of itching and rash, but now not anymore these flare ups.  In the moment skin well controlled on Dupixent every 2 weeks. Just some erythema and desqumation over the arms and elbows and maybe the legs and abdomen. Back clear.    Earlier History and Allergy exams:  (10/12/21)  - [facility] and the new PCP stopped the Dupixent because of the high WBC. Took patient off Dupixent from April to September. In May rash back with revenge and had severe flare ups that had to be treated with oral steroids (4x over the summer).  - patient had in April/May 2021 a full Lymphoma/Leucemia evaluation with bone marrow and there was no signs for hematologic malignancy. However, high Eosinophils and IgE of more than 4000  - End of September patient was put again on Dupixent. Now 6 weeks on the Dupixent and   - right after injection for 2-3 days some aggravation of rash, then  improvement    Skin:in the moment diffuse erythema on trunk and some lichenification over the neck, but according to patient much better. Some erythema tita ani and intertriginous, but not active    Earlier History and Allergy exams:  01/2021  Patient on Dupixent since October 2018 = 2 years and has on and off attacks of eczemas on the entire body.  Problems start usually few days before next injection of Dupixent and therefore patient still needs the Dupixent.  Did not use the oral prednisone anymore (last time in August 2020)    Previous History of Present Illness:    Patient has been on Dupixent since October 2018 and has dramatic improvement in his skin. Patient continues to report intermittent pruritus, mostly on his bilateral arms, back, and stomach. He receives dupixent every other week (next dose due 7/10). He applies Vanicream at least daily, sometimes more frequently to affected areas. He only applies triamcinolone 0.1% cream to the affected areas as needed.       7/20/20: still with occasional erythematous breakouts, mainly around abdomen and trunk, quite pruritic. Still happening every 3 or 4 days, but before dupixent was far more frequent for 4 years.  Feels the dupixent is definitely helping significantly. Also has pruritis when getting out of the hot shower, and so he limits these. Has not had any triggers he notices. Vanicream helps with breakouts. After dupixent injections, gets briefly inflamed, then at the end of the two weeks dermatitis begins to reappear. Thus, still having slight increase of dermatitis at the end of the two weeks.    Hx since 7/8/19v    Today (10/4/19) the pt comes in today for followup. He reports that he is breaking out. This flare up started 2.5 weeks ago without obvious source. Gaetano thinks this started improving last night. He currently has a widespread red, pruritic rash He continues the Dupixent injection every 2 weeks with his last injection yesterday. He is no longer  taking the Zyrtec.    Hx since 10/4/19:  The patient comes in today (11/29/19) for followup. He reports that he was unable to obtain the medications prescribed at the last visit, notably the prednisone burst.  He states that he has been taking dupixent every 2 weeks and gets flare-ups that are intermittent. States that the symptoms subside after dupixent injection for a time but then he will still get flare-ups.  States that he has not used the triamcinolone cream in the interim due to it being a steroid.   No side effects from the dupixent.  The patient is otherwise feeling well today. There are no other allergy concerns at this time.      Past Medical History:   - No history of eczema in childhood. No history of asthma.     No past surgical history on file.    Social History:  Patient reports that he has been smoking. He has never used smokeless tobacco. He reports current alcohol use. 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     Family History:  Atopic dermatitis in 2 of his grandchildren.     Medications:  Current Outpatient Medications   Medication Sig Dispense Refill     baclofen (LIORESAL) 10 MG tablet        bisacodyl (DULCOLAX) 10 MG suppository        cetirizine (ZYRTEC) 10 MG tablet        cholecalciferol 50 MCG (2000 UT) tablet 50 mcg       Cyanocobalamin (B-12) 1000 MCG TABS        doxepin (SINEQUAN) 50 MG capsule        Dupilumab (DUPIXENT) 300 MG/2ML syringe Inject 2 mLs (300 mg) Subcutaneous every 14 days 2 mL 3     FLOMAX 0.4 MG capsule  (Patient not taking: No sig reported)       furosemide (LASIX) 20 MG tablet        lidocaine (LIDODERM) 5 % patch        lidocaine (XYLOCAINE) 5 % external ointment        Lidocaine HCl Urethral/Mucosal 2 % external gel        pantoprazole (PROTONIX) 40 MG EC tablet        thiamine (B-1) 100 MG tablet 100 mg       Allergies   Allergen Reactions     Procaine Swelling     Sulfa Antibiotics Swelling and Itching      swelling     Valproic Acid Itching and Rash     hives     Lamotrigine      Zonisamide      Other reaction(s): Eruption of skin, Swelling, Eruption of skin, Swelling, Swelling, Swelling, Nausea and Vomiting, Swelling, Nausea and Vomiting     Ibuprofen Rash     Other reaction(s): Eruption of skin     Peg 3350-Electrolytes Rash     Polyethylene Glycol Rash         Review of Systems:  -Constitutional: Otherwise feeling well today, in usual state of health.  -Skin: As above in HPI. No additional skin concerns.    Physical exam:  Vitals: There were no vitals taken for this visit.  GEN: This is a well developed, well-nourished male in no acute distress, in a pleasant mood.    SKIN: Focused examination of the hands, arms, legs, back, abdomen, and face was performed.  - over abdominal and flank and gluteal area erythema and slight infiltration and as well inner part of tights.  - No clear extensive excoriations  However, remarkable improvement still      Previous Patch Testing for Reference:    Order for PATCH TESTS    [] Outpatient  [] Inpatient: [facility]..../ Bed ....      Skin Atopy (atopic dermatitis) [x] Yes   [] No  Rhinitis/Sinusitis:   [x] Yes   [] No  Allergic Asthma:   [] Yes   [x] No  Food Allergy:   [] Yes   [x] No  Leg ulcers:   [] Yes   [x] No  Hand eczema:   [x] Yes   [] No   Leading hand:   [x] R   [] L       [] Ambidextrous                        Reason for tests (suspected allergy): generalized subacute to chronic eczema with spongiosis and peripheral and skin Eosinophilia  Known previous allergies: Sulphonamides, Valproic acid and Procaine    Standardized panels  [x] Standard panel (40 tests)  [x] Preservatives & Antimicrobials (31 tests)  [x] Emulsifiers & Additives (25 tests)   [] Perfumes/Flavours & Plants (25 tests)  [] Hairdresser panel (12 tests)  [] Rubber Chemicals (22 tests)  [] Plastics (26 tests)  [] Colorants/Dyes/Food additives (20 tests)  [] Metals (implants/dental) (23 tests)  [] Local  anaesthetics/NSAIDs (12 tests)  [] Antibiotics & Antimycotics (14 tests)   [x] Corticosteroids (15 tests)   [] Photopatch test (32 tests)   [] others: ...      [] Patient's own products: ...    DO NOT test if chemical or biological identity is unknown!     always ask from patient the product information and safety sheets (MSDS)     [] Patient needs consultation with Allergy team (changes of tests may apply)  [x] Tests discussed with Allergy team (can have direct appointment for patch tests)    RESULTS & EVALUATION of PATCH TESTS    Date/time of application:  Physician/Nurse:  / Felicia Ceballos LPN               Localization of application: Back --> on lower back chronic eczema, but upper back can be used for patch tests. Rather dry there, but no acute eczema    Patch test readings after     [x] 2 days, [] 3 days [x] 4 days, [] 5 days,   Other duration: ...    Applied patch tests with results (import here the list of patch tests):  Date/time of application:10/01/18   Physician/Nurse:  / Felicia Ceballos LPN               Localization of application: Back     STANDARD Series         No Substance 2 days 4 days remarks   1 Jhony Mix [C] - -    2 Colophony - -    3  2-Mercaptobenzothiazole  - -     4 Methylisothiazolinone - -    5 Carba Mix - -    6 Thiurma Mix [A] - -    7 Bisphenol A Epoxy Resin - -    8 K-Usne-Btukwkoeliy-Formaldehyde Resin - -    9 Mercapto Mix [A] - -    10 Black Rubber Mix- PPD [B] - -    11 Potassium Dichromate  + +/++ 5mm erosion   12 Balsam of [place] (Myroxylon Pereirae Resin) - -    13 Nickel Sulphate Hexahydrate +/++ ++ 14mm erosion   14 Mixed Dialkyl Thiourea - -    15 Paraben Mix [B] (+) + 4mm erosion   16 Methyldibromo Glutaronitrile - + 4mm erosion   17 Fragrance Mix - -    18 2-Bromo-2-Nitropropane-1,3-Diol (Bronopol) - -    19 Lyral - -    20 Tixocortol-21- Pivalate - -    21 Diazolidiyl Urea (Germall II) - -    22 Methyl Methacrylate - -    23 Cobalt (II) Chloride  Hexahydrate + -    24 Fragrance Mix II  - -    25 Compositae Mix - -    26 Benzoyl Peroxide - -    27 Bacitracin - -    28 Formaldehyde - -    29 Methylchloroisothiazolinone / Methylisothiazolinone - -    30 Corticosteroid Mix - -    31 Sodium Lauryl Sulfate - -    32 Lanolin Alcohol - -    33 Turpentine - -    34 Cetylstearylalcohol - -    35 Chlorhexidine Dicluconate - -    36 Budenoside - -    37 Imidazolidinyl Urea  - -    38 Ethyl-2 Cyanoacrylate - -    39 Quaternium 15 (Dowicil 200) - -    40 Decyl Glucoside - -      PRESERVATIVES & ANTIMICROBIALS         No Substance 2 days 4 days remarks   41  1,2-Benzisothiazoline-3-One, Sodium Salt - -    42  1,3,5-Antwan (2-Hydroxyethyl) - Hexahydrotriazine (Grotan BK) - -    43 8-Ltombmtkhvnvb-0-Nitro-1, 3-Propanediol - -    44  3, 4, 4' - Triclocarban - -    45 4 - Chloro - 3 - Cresol - -    46 4 - Chloro - 4 - Xylenol (PCMX) - -    47 7-Ethylbicyclooxazolidine (Bioban [id number]) - -    48 Benzalkonium Chloride - -    49 Benzyl Alcohol - -    50 Cetalkonium Chloride - -    51 Cetylpyrimidine Chloride  - -    NA Chloroacetamide NA NA    52 DMDM Hydantoin - -    53 Glutaraldehyde - -    54 Triclosan - -    55 Glyoxal Trimeric Dihydrate (+) + 7mm Erosion   56 Iodopropynyl Butylcarbamate - -    57 Octylisothiazoline - -    58 Iodoform - -    59 (Nitrobutyl) Morpholine/(Ethylnitro-Trimethylene) Dimorpholine (Bioban P 1487) - -    60 Phenoxyethanol - -    61 Phenyl Salicylate - -    62 Povidone Iodine - -    63 Sodium Benzoate - -    64 Sodium Disulfite - -    65 Sorbic Acid - -    66 Thimerosal - -     Parabens      67 Butyl-P-Hydroxybenzoate - -    68 Ethyl-P-Hydroxybenzoate - -    69 Methyl-P-Hydroxybenzoate - -    70 Propyl-P-Hydroxybenzoate - -      EMULSIFIERS & ADDITIVES        No Substance 2 days 4 days remarks   71 Polyethylene Glycol-400 (+) -    72 Cocamidopropyl Betaine - -    73 Amerchol L101 - -    74 Propylene Glycol - -    75 Triethanolamine - -    76 Sorbitane  Sesquiolate - -    77 Isopropylmyristate (+) -    78 Polysorbate 80  - -    79 Amidoamine   (Stearamidopropyl Dimethylamine) - -    80 Oleamidopropyl Dimethylamine - -    81 Lauryl Glucoside - -    82 Coconut Diethanolamide  - -    83 2-Hydroxy-4-Methoxy Benzophenone (Oxybenzone) - -    84 Benzophenone-4 (Sulisobenzon) - -    85 Propolis - -    86 Dexpanthenol - -    87 Carboxymethyl Cellulose Sodium - -    88 Abitol - -    89 Tert-Butylhydroquinone - -    90 Benzyl Salicylate - -     Antioxidant      91 Dodecyl Gallate - -    92 Butylhydroxyanisole (BHA) - -    93 Butylhydroxytoluene (BHT) - -    94 Di-Alpha-Tocopherol (Vit E) - -    95 Propyl Gallate - -      CORTICOSTEROIDS    No Substance 2 days 4 days remarks Allergy  class   96 Amcinonide - -  B   97 Betametasone-17,21 Dipropionate - -  D1   98 Desoximetasone - -  C   99 Betamethasone-17-Valerate - -  D1   100 Dexamethasone - -  C   101 Hydrocortisone - -  A   102 Clobetasol-17-Propionate - -  D1   103 Dexamethasone-21-Phosphate Disodium Salt - -  C   104 Hydrocortisone-17 Butyrate - -  D2   105 Prednisolone - -  A   106 Mometason Furoate - -  D1   107 Triamcinolone Acetonide - -  B   108 Methylprednisolone Aceponate - -  D2   109 Hydrocortisone-21-Acetate - -  A   110 Prednicarbate - -  D2       Group Characteristics of group Generic name Name  cross reactions   A Hydrocortisone   Cloprednole, Fludrocortisone acétate, Hydrocortison acetate, Methylprednisolone, Prednisolone, Tixocortolpivalate Alfacortone, Fucidin H, Dermacalm, Hexacortone, Premandole, Imacort With group D2   B Triamcinolone-acetonide   Budenoside (R-isomer), Amcinonide, Desonide, Fluocinolone acetonide, Triamcinolone acetonide Locapred, Locatop  Synalar, Pevisone, [place] -   C Betamethasone (Without Romina)   Betamethasone, Dexamethasone, Flumethasone pivalate, Halomethasone Daivobet, Dexasalyl, Locasalen,   -   D1 Betamethasone-diproprionate   Betamethasone dipropionate,  Betamethasone-17-valerate, Clobetasole-propionate, Fluticasone propionate, Mometasone furoate Betnovate, Diprogenta, Diprosalic, Diprosone, Celestoderm, Fucicort,  Cutivate, Axotide, Elocom -   D2 Methylprednisolone-aceponate   Hydrocortisone-aceponate, Hydrocortisone-buteprate, Hydrocortisone-17-butyrate, Methylprednisolone aceponate, Prednicarbate Locoïd, Advantan,  Prednitop With group A and Budesonide (S-isomer)     Results of patch tests:                         Interpretation:    - Negative                    A    = Allergic      (+) Erythema    TI   = Toxic/irritant   + E + Infiltration    RaP = Relevance at Present     ++ E/I + Papulovesicle   Rpr  = Relevance Previously     +++ E/I/P + Blister     nR   = No Relevance    Atopy Screen (07/08/2019)    No Substance Readings (15min) Evaluation   POS Histamine 1mg/ml ++    NEG NaCl 0.9% -      No Substance Readings (15min) Evaluation   1 Alternaria alternata (tenuis)  -    2 Cladosporium herbarum -    3 Aspergillus fumigatus -    4 Penicillium notatum -    5 Dermatophagoides pteronyssinus +++    6 Dermatophagoides farinae -    7 Dog epithelium (canis spp) -    8 Cat hair (steve catus) -    9 Cockroach   (Blatella americana & germanica) -    10 Grass mix midwest   (Noris, Orchard, Redtop, Brayden) -    11  grass (sorghum halepense) -    12 Weed mix   (common Cocklebur, Lamb s quarters, rough redroot Pigweed, Dock/Sorrel) -    13 Mugwort (artemisia vulgare) -    14 Ragweed giant/short (ambrosia spp) -    15 English Plantain (plantago lanceolata) -    16 Tree mix 1 (Pecan, Maple BHR, Oak RVW, american [place], black [place]) NA    17 Red cedar (juniperus virginia) -    18 Tree mix 2   (white Dirk, river/red Birch, black [place], common [place], american Elm) NA    19 Box elder/Maple mix (acer spp) -    20 [place] shagbark (carya ovata) -       -    Conclusions: Patient is atopic with clear immediate type reaction to D. Pteronyssinus (house dust mite). Patient  should observe the molds and house dust mite areas and report back with photos if any delayed reaction there       [x] Allergic reaction diagnosed against: against metals (Cobalt, Nickel and chromate) and as well angry back --> could go well with atopic dermatitis   ==> the reactions are all erosions and not typical allergic, infiltrated lesions.     Interpretation/ remarks:   First of all, after removal patient has angry back (see photo). Based on that the results are difficult to obtain. However, all the reactions were well defined erosions without infiltration and therefore rather irritant reactions with underlying atopic dermatitis than real allergic reactions.  The only real reaction seems to be to the metals, particularly Nickel, which would go well with atopic dermatitis      Assessment & Plan:    ==> Final Diagnosis:     #  Severe exantematic, generalized Atopic Dermatitis: Atopy skin prick test on 7/8/2019 with clear immediate type reaction to D. Pteronyssinus (house dust mite).   * chronic illness with exacerbation, progression, side effects from treatment    Has been on Dupixent since 10/17/18 300mg every other week and with this treatment skin under control    Use gentle free and clear soaps and shampoos  ==> atopic predisposition with clinically relevant sensitization to house dust mites (D. Pteronyssinus) with Rhinitis entire day = given infos for house dust mite reduction    # Seborrheic Dermatitis   * chronic illness with exacerbation, progression, side effects from treatment  - Start ketoconazole 2% shampoo - apply three times a week     Follow-up in 6-12 months unless [facility] another [facility], then follow up with the [facility] preferred dermat[facility].      These conclusions are made at the best of one's knowledge and belief based on the provided evidence such as patient's history and allergy test results and they can change over time or can be incomplete because of missing  information's.    ==> Treatment Plan:      Continue with every 2-4 weeks Dupixent. Maybe try to space out for 3 months to every 3 weeks and if no worsening for 4 months monthly and then if still no worsening could discuss to stop Dupxient. However, if itching and dry skin comes back, go back to every 2 weeks.     Continue Vanicream Cream. Put on entire skin 2-3 times per day. Pt has very dry skin and might use one jar every 1-2 weeks. Refills provided today.  ___________________________     Staff: : provider    Follow-up in Derm-Allergy clinic in about 11 months  ___________________________    I spent a total of 22 minutes with Mike Naidu during today s  visit. This time was spent discussing all the individual test results, correlating them to the clinical relevance, counseling the patient and/or coordinating care

## 2023-07-05 ENCOUNTER — OFFICE VISIT (OUTPATIENT)
Dept: ALLERGY | Facility: CLINIC | Age: 73
End: 2023-07-05
Payer: COMMERCIAL

## 2023-07-05 DIAGNOSIS — L21.9 DERMATITIS, SEBORRHEIC: ICD-10-CM

## 2023-07-05 DIAGNOSIS — L20.89 OTHER ATOPIC DERMATITIS: Primary | ICD-10-CM

## 2023-07-05 PROCEDURE — 99213 OFFICE O/P EST LOW 20 MIN: CPT | Performed by: DERMATOLOGY

## 2023-07-05 RX ORDER — DUPILUMAB 300 MG/2ML
300 INJECTION, SOLUTION SUBCUTANEOUS
Qty: 4 ML | Refills: 11 | Status: SHIPPED | OUTPATIENT
Start: 2023-07-05 | End: 2024-06-13

## 2023-07-05 ASSESSMENT — PAIN SCALES - GENERAL: PAINLEVEL: NO PAIN (0)

## 2023-07-05 NOTE — NURSING NOTE
Dermatology Rooming Note    Mike Naidu's goals for this visit include:   Chief Complaint   Patient presents with     Allergy Recheck     Eosinophilia - Gaetano states he has been doing well on Dupixent      Kayrene S., CMA

## 2023-07-05 NOTE — LETTER
7/5/2023         RE: Mike Naidu  [id number] [address] [address]        Dear Colleague,    Thank you for referring your patient, Mike Naidu, to the [facility] [place]. Please see a copy of my visit note below.    [facility] Dermato-allergology Note  Office visit  Encounter Date: Jul 5, 2023  ____________________________________________    CC: No chief complaint on file.      HPI:  (Jul 5, 2023)  Mr. Mike Naidu is a(n) 73 year old male who presents today as a return patient for allergy consultation  - Patient is still on Dupixent injections (started in October 2018) and skin seems to be very well controlled. In the moment no itching after 2 weeks.  - otherwise feeling well in usual state of health    Physical exam:  General: In no acute distress, well-developed, well-nourished  Eyes: no conjunctivitis  ENT: no signs of rhinitis   Pulmonary: no wheezing or coughing  Skin:in the moment skin without major lesions. Still a little bit dry    Earlier History and Allergy exams:  (Sep 6, 2022)  - Follow-up in Derm-Allergy clinic in January 2022 (do CBC diff and total IgE before) ==> last seen 10/12/2021  - patient had spinal cord surgery and now here with scooter (problems to walk)  - Felt initially after 2 weeks without Dupixent a flare up of itching and rash, but now not anymore these flare ups.  In the moment skin well controlled on Dupixent every 2 weeks. Just some erythema and desqumation over the arms and elbows and maybe the legs and abdomen. Back clear.    Earlier History and Allergy exams:  (10/12/21)  - [facility] and [facility] new PCP stopped the Dupixent because of the high WBC. Took patient off Dupixent from April to September. In May rash back with revenge and had severe flare ups that had to be treated with oral steroids (4x over the summer).  - patient had in April/May 2021 a full Lymphoma/Leucemia evaluation  with bone marrow and there was no signs for hematologic malignancy. However, high Eosinophils and IgE of more than 4000  - End of September patient was put again on Dupixent. Now 6 weeks on the Dupixent and   - right after injection for 2-3 days some aggravation of rash, then improvement    Skin:in the moment diffuse erythema on trunk and some lichenification over the neck, but according to patient much better. Some erythema tita ani and intertriginous, but not active    Earlier History and Allergy exams:  01/2021  Patient on Dupixent since October 2018 = 2 years and has on and off attacks of eczemas on the entire body.  Problems start usually few days before next injection of Dupixent and therefore patient still needs the Dupixent.  Did not use the oral prednisone anymore (last time in August 2020)    Previous History of Present Illness:    Patient has been on Dupixent since October 2018 and has dramatic improvement in his skin. Patient continues to report intermittent pruritus, mostly on his bilateral arms, back, and stomach. He receives dupixent every other week (next dose due 7/10). He applies Vanicream at least daily, sometimes more frequently to affected areas. He only applies triamcinolone 0.1% cream to the affected areas as needed.       7/20/20: still with occasional erythematous breakouts, mainly around abdomen and trunk, quite pruritic. Still happening every 3 or 4 days, but before dupixent was far more frequent for 4 years.  Feels the dupixent is definitely helping significantly. Also has pruritis when getting out of the hot shower, and so he limits these. Has not had any triggers he notices. Vanicream helps with breakouts. After dupixent injections, gets briefly inflamed, then at the end of the two weeks dermatitis begins to reappear. Thus, still having slight increase of dermatitis at the end of the two weeks.    Hx since 7/8/19v    Today (10/4/19) the pt comes in today for followup. He reports that he  is breaking out. This flare up started 2.5 weeks ago without obvious source. Gaetano thinks this started improving last night. He currently has a widespread red, pruritic rash He continues the Dupixent injection every 2 weeks with his last injection yesterday. He is no longer taking the Zyrtec.    Hx since 10/4/19:  The patient comes in today (11/29/19) for followup. He reports that he was unable to obtain the medications prescribed at the last visit, notably the prednisone burst.  He states that he has been taking dupixent every 2 weeks and gets flare-ups that are intermittent. States that the symptoms subside after dupixent injection for a time but then he will still get flare-ups.  States that he has not used the triamcinolone cream in the interim due to it being a steroid.   No side effects from the dupixent.  The patient is otherwise feeling well today. There are no other allergy concerns at this time.      Past Medical History:   - No history of eczema in childhood. No history of asthma.     No past surgical history on file.    Social History:  Patient reports that he has been smoking. He has never used smokeless tobacco. He reports current alcohol use. 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     Family History:  Atopic dermatitis in 2 of his grandchildren.     Medications:  Current Outpatient Medications   Medication Sig Dispense Refill     baclofen (LIORESAL) 10 MG tablet        bisacodyl (DULCOLAX) 10 MG suppository        cetirizine (ZYRTEC) 10 MG tablet        cholecalciferol 50 MCG (2000 UT) tablet 50 mcg       Cyanocobalamin (B-12) 1000 MCG TABS        doxepin (SINEQUAN) 50 MG capsule        Dupilumab (DUPIXENT) 300 MG/2ML syringe Inject 2 mLs (300 mg) Subcutaneous every 14 days 2 mL 3     FLOMAX 0.4 MG capsule  (Patient not taking: No sig reported)       furosemide (LASIX) 20 MG tablet        lidocaine (LIDODERM) 5 % patch        lidocaine (XYLOCAINE) 5 %  external ointment        Lidocaine HCl Urethral/Mucosal 2 % external gel        pantoprazole (PROTONIX) 40 MG EC tablet        thiamine (B-1) 100 MG tablet 100 mg       Allergies   Allergen Reactions     Procaine Swelling     Sulfa Antibiotics Swelling and Itching     swelling     Valproic Acid Itching and Rash     hives     Lamotrigine      Zonisamide      Other reaction(s): Eruption of skin, Swelling, Eruption of skin, Swelling, Swelling, Swelling, Nausea and Vomiting, Swelling, Nausea and Vomiting     Ibuprofen Rash     Other reaction(s): Eruption of skin     Peg 3350-Electrolytes Rash     Polyethylene Glycol Rash         Review of Systems:  -Constitutional: Otherwise feeling well today, in usual state of health.  -Skin: As above in HPI. No additional skin concerns.    Physical exam:  Vitals: There were no vitals taken for this visit.  GEN: This is a well developed, well-nourished male in no acute distress, in a pleasant mood.    SKIN: Focused examination of the hands, arms, legs, back, abdomen, and face was performed.  - over abdominal and flank and gluteal area erythema and slight infiltration and as well inner part of tights.  - No clear extensive excoriations  However, remarkable improvement still      Previous Patch Testing for Reference:    Order for PATCH TESTS    [] Outpatient  [] Inpatient: [facility]..../ Bed ....      Skin Atopy (atopic dermatitis) [x] Yes   [] No  Rhinitis/Sinusitis:   [x] Yes   [] No  Allergic Asthma:   [] Yes   [x] No  Food Allergy:   [] Yes   [x] No  Leg ulcers:   [] Yes   [x] No  Hand eczema:   [x] Yes   [] No   Leading hand:   [x] R   [] L       [] Ambidextrous                        Reason for tests (suspected allergy): generalized subacute to chronic eczema with spongiosis and peripheral and skin Eosinophilia  Known previous allergies: Sulphonamides, Valproic acid and Procaine    Standardized panels  [x] Standard panel (40 tests)  [x] Preservatives & Antimicrobials (31 tests)  [x]  Emulsifiers & Additives (25 tests)   [] Perfumes/Flavours & Plants (25 tests)  [] Hairdresser panel (12 tests)  [] Rubber Chemicals (22 tests)  [] Plastics (26 tests)  [] Colorants/Dyes/Food additives (20 tests)  [] Metals (implants/dental) (23 tests)  [] Local anaesthetics/NSAIDs (12 tests)  [] Antibiotics & Antimycotics (14 tests)   [x] Corticosteroids (15 tests)   [] Photopatch test (32 tests)   [] others: ...      [] Patient's own products: ...    DO NOT test if chemical or biological identity is unknown!     always ask from patient the product information and safety sheets (MSDS)     [] Patient needs consultation with Allergy team (changes of tests may apply)  [x] Tests discussed with Allergy team (can have direct appointment for patch tests)    RESULTS & EVALUATION of PATCH TESTS    Date/time of application:  Physician/Nurse:  / Felicia Ceballos LPN               Localization of application: Back --> on lower back chronic eczema, but upper back can be used for patch tests. Rather dry there, but no acute eczema    Patch test readings after     [x] 2 days, [] 3 days [x] 4 days, [] 5 days,   Other duration: ...    Applied patch tests with results (import here the list of patch tests):  Date/time of application:10/01/18   Physician/Nurse:  / Felicia Ceballos LPN               Localization of application: Back     STANDARD Series         No Substance 2 days 4 days remarks   1 Jhony Mix [C] - -    2 Colophony - -    3  2-Mercaptobenzothiazole  - -     4 Methylisothiazolinone - -    5 Carba Mix - -    6 Thiurma Mix [A] - -    7 Bisphenol A Epoxy Resin - -    8 Y-Gcxi-Cuvovtgukav-Formaldehyde Resin - -    9 Mercapto Mix [A] - -    10 Black Rubber Mix- PPD [B] - -    11 Potassium Dichromate  + +/++ 5mm erosion   12 Balsam of [place] (Myroxylon Pereirae Resin) - -    13 Nickel Sulphate Hexahydrate +/++ ++ 14mm erosion   14 Mixed Dialkyl Thiourea - -    15 Paraben Mix [B] (+) + 4mm erosion   16  Methyldibromo Glutaronitrile - + 4mm erosion   17 Fragrance Mix - -    18 2-Bromo-2-Nitropropane-1,3-Diol (Bronopol) - -    19 Lyral - -    20 Tixocortol-21- Pivalate - -    21 Diazolidiyl Urea (Germall II) - -    22 Methyl Methacrylate - -    23 Cobalt (II) Chloride Hexahydrate + -    24 Fragrance Mix II  - -    25 Compositae Mix - -    26 Benzoyl Peroxide - -    27 Bacitracin - -    28 Formaldehyde - -    29 Methylchloroisothiazolinone / Methylisothiazolinone - -    30 Corticosteroid Mix - -    31 Sodium Lauryl Sulfate - -    32 Lanolin Alcohol - -    33 Turpentine - -    34 Cetylstearylalcohol - -    35 Chlorhexidine Dicluconate - -    36 Budenoside - -    37 Imidazolidinyl Urea  - -    38 Ethyl-2 Cyanoacrylate - -    39 Quaternium 15 (Dowicil 200) - -    40 Decyl Glucoside - -      PRESERVATIVES & ANTIMICROBIALS         No Substance 2 days 4 days remarks   41  1,2-Benzisothiazoline-3-One, Sodium Salt - -    42  1,3,5-Antwan (2-Hydroxyethyl) - Hexahydrotriazine (Grotan BK) - -    43 9-Xukavuekuvthb-5-Nitro-1, 3-Propanediol - -    44  3, 4, 4' - Triclocarban - -    45 4 - Chloro - 3 - Cresol - -    46 4 - Chloro - 4 - Xylenol (PCMX) - -    47 7-Ethylbicyclooxazolidine (Bioban [id number]) - -    48 Benzalkonium Chloride - -    49 Benzyl Alcohol - -    50 Cetalkonium Chloride - -    51 Cetylpyrimidine Chloride  - -    NA Chloroacetamide NA NA    52 DMDM Hydantoin - -    53 Glutaraldehyde - -    54 Triclosan - -    55 Glyoxal Trimeric Dihydrate (+) + 7mm Erosion   56 Iodopropynyl Butylcarbamate - -    57 Octylisothiazoline - -    58 Iodoform - -    59 (Nitrobutyl) Morpholine/(Ethylnitro-Trimethylene) Dimorpholine (Bioban P 1487) - -    60 Phenoxyethanol - -    61 Phenyl Salicylate - -    62 Povidone Iodine - -    63 Sodium Benzoate - -    64 Sodium Disulfite - -    65 Sorbic Acid - -    66 Thimerosal - -     Parabens      67 Butyl-P-Hydroxybenzoate - -    68 Ethyl-P-Hydroxybenzoate - -    69 Methyl-P-Hydroxybenzoate - -     70 Propyl-P-Hydroxybenzoate - -      EMULSIFIERS & ADDITIVES        No Substance 2 days 4 days remarks   71 Polyethylene Glycol-400 (+) -    72 Cocamidopropyl Betaine - -    73 Amerchol L101 - -    74 Propylene Glycol - -    75 Triethanolamine - -    76 Sorbitane Sesquiolate - -    77 Isopropylmyristate (+) -    78 Polysorbate 80  - -    79 Amidoamine   (Stearamidopropyl Dimethylamine) - -    80 Oleamidopropyl Dimethylamine - -    81 Lauryl Glucoside - -    82 Coconut Diethanolamide  - -    83 2-Hydroxy-4-Methoxy Benzophenone (Oxybenzone) - -    84 Benzophenone-4 (Sulisobenzon) - -    85 Propolis - -    86 Dexpanthenol - -    87 Carboxymethyl Cellulose Sodium - -    88 Abitol - -    89 Tert-Butylhydroquinone - -    90 Benzyl Salicylate - -     Antioxidant      91 Dodecyl Gallate - -    92 Butylhydroxyanisole (BHA) - -    93 Butylhydroxytoluene (BHT) - -    94 Di-Alpha-Tocopherol (Vit E) - -    95 Propyl Gallate - -      CORTICOSTEROIDS    No Substance 2 days 4 days remarks Allergy  class   96 Amcinonide - -  B   97 Betametasone-17,21 Dipropionate - -  D1   98 Desoximetasone - -  C   99 Betamethasone-17-Valerate - -  D1   100 Dexamethasone - -  C   101 Hydrocortisone - -  A   102 Clobetasol-17-Propionate - -  D1   103 Dexamethasone-21-Phosphate Disodium Salt - -  C   104 Hydrocortisone-17 Butyrate - -  D2   105 Prednisolone - -  A   106 Mometason Furoate - -  D1   107 Triamcinolone Acetonide - -  B   108 Methylprednisolone Aceponate - -  D2   109 Hydrocortisone-21-Acetate - -  A   110 Prednicarbate - -  D2       Group Characteristics of group Generic name Name  cross reactions   A Hydrocortisone   Cloprednole, Fludrocortisone acétate, Hydrocortison acetate, Methylprednisolone, Prednisolone, Tixocortolpivalate Alfacortone, Fucidin H, Dermacalm, Hexacortone, Premandole, Imacort With group D2   B Triamcinolone-acetonide   Budenoside (R-isomer), Amcinonide, Desonide, Fluocinolone acetonide, Triamcinolone acetonide  Locapred, Locatop  Synalar, Pevisone, Ken[place]ort -   C Betamethasone (Without Romina)   Betamethasone, Dexamethasone, Flumethasone pivalate, Halomethasone Daivobet, Dexasalyl, Locasalen,   -   D1 Betamethasone-diproprionate   Betamethasone dipropionate, Betamethasone-17-valerate, Clobetasole-propionate, Fluticasone propionate, Mometasone furoate Betnovate, Diprogenta, Diprosalic, Diprosone, Celestoderm, Fucicort,  Cutivate, Axotide, Elocom -   D2 Methylprednisolone-aceponate   Hydrocortisone-aceponate, Hydrocortisone-buteprate, Hydrocortisone-17-butyrate, Methylprednisolone aceponate, Prednicarbate Locoïd, Advantan,  Prednitop With group A and Budesonide (S-isomer)     Results of patch tests:                         Interpretation:    - Negative                    A    = Allergic      (+) Erythema    TI   = Toxic/irritant   + E + Infiltration    RaP = Relevance at Present     ++ E/I + Papulovesicle   Rpr  = Relevance Previously     +++ E/I/P + Blister     nR   = No Relevance    Atopy Screen (07/08/2019)    No Substance Readings (15min) Evaluation   POS Histamine 1mg/ml ++    NEG NaCl 0.9% -      No Substance Readings (15min) Evaluation   1 Alternaria alternata (tenuis)  -    2 Cladosporium herbarum -    3 Aspergillus fumigatus -    4 Penicillium notatum -    5 Dermatophagoides pteronyssinus +++    6 Dermatophagoides farinae -    7 Dog epithelium (canis spp) -    8 Cat hair (steve catus) -    9 Cockroach   (Blatella americana & germanica) -    10 Grass mix midwest   (Noris, Orchard, Redtop, Brayden) -    11  grass (sorghum halepense) -    12 Weed mix   (common Cocklebur, Lamb s quarters, rough redroot Pigweed, Dock/Sorrel) -    13 Mugwort (artemisia vulgare) -    14 Ragweed giant/short (ambrosia spp) -    15 English Plantain (plantago lanceolata) -    16 Tree mix 1 (Pecan, Maple BHR, Oak RVW, american Holiday, black [place]) NA    17 Red cedar (juniperus virginia) -    18 Tree mix 2   (white Dirk, river/red Birch,  black [place], common [place], american Elm) NA    19 Box elder/Maple mix (acer spp) -    20 [place] shagbark (carya ovata) -       -    Conclusions: Patient is atopic with clear immediate type reaction to D. Pteronyssinus (house dust mite). Patient should observe the molds and house dust mite areas and report back with photos if any delayed reaction there       [x] Allergic reaction diagnosed against: against metals (Cobalt, Nickel and chromate) and as well angry back --> could go well with atopic dermatitis   ==> the reactions are all erosions and not typical allergic, infiltrated lesions.     Interpretation/ remarks:   First of all, after removal patient has angry back (see photo). Based on that the results are difficult to obtain. However, all the reactions were well defined erosions without infiltration and therefore rather irritant reactions with underlying atopic dermatitis than real allergic reactions.  The only real reaction seems to be to the metals, particularly Nickel, which would go well with atopic dermatitis      Assessment & Plan:    ==> Final Diagnosis:     #  Severe exantematic, generalized Atopic Dermatitis: Atopy skin prick test on 7/8/2019 with clear immediate type reaction to D. Pteronyssinus (house dust mite).   * chronic illness with exacerbation, progression, side effects from treatment    Has been on Dupixent since 10/17/18 300mg every other week and with this treatment skin under control    Use gentle free and clear soaps and shampoos  ==> atopic predisposition with clinically relevant sensitization to house dust mites (D. Pteronyssinus) with Rhinitis entire day = given infos for house dust mite reduction    # Seborrheic Dermatitis   * chronic illness with exacerbation, progression, side effects from treatment  - Start ketoconazole 2% shampoo - apply three times a week     Follow-up in 6-12 months unless [facility] prefers another [facility], then follow up with the [facility]'s preferred  [facility]matologist.      These conclusions are made at the best of one's knowledge and belief based on the provided evidence such as patient's history and allergy test results and they can change over time or can be incomplete because of missing information's.    ==> Treatment Plan:      Continue with every 2-4 weeks Dupixent. Maybe try to space out for 3 months to every 3 weeks and if no worsening for 4 months monthly and then if still no worsening could discuss to stop Dupxient. However, if itching and dry skin comes back, go back to every 2 weeks.     Continue Vanicream Cream. Put on entire skin 2-3 times per day. Pt has very dry skin and might use one jar every 1-2 weeks. Refills provided today.  ___________________________     Staff: : provider    Follow-up in Derm-Allergy clinic in about 11 months  ___________________________    I spent a total of 22 minutes with Mike Naidu during today s  visit. This time was spent discussing all the individual test results, correlating them to the clinical relevance, counseling the patient and/or coordinating care         Again, thank you for allowing me to participate in the care of your patient.        Sincerely,        Leon Lanier MD

## 2023-07-05 NOTE — PATIENT INSTRUCTIONS
Assessment & Plan:    ==> Final Diagnosis:     #  Severe exantematic, generalized Atopic Dermatitis: Atopy skin prick test on 7/8/2019 with clear immediate type reaction to D. Pteronyssinus (house dust mite).   * chronic illness with exacerbation, progression, side effects from treatment  Has been on Dupixent since 10/17/18 300mg every other week and with this treatment skin under control  Use gentle free and clear soaps and shampoos  ==> atopic predisposition with clinically relevant sensitization to house dust mites (D. Pteronyssinus) with Rhinitis entire day = given infos for house dust mite reduction    # Seborrheic Dermatitis   * chronic illness with exacerbation, progression, side effects from treatment  - Start ketoconazole 2% shampoo - apply three times a week     Follow-up in 6-12 months unless the [facility] prefers another [facility], then follow up with the [facility] preferred [facility].      These conclusions are made at the best of one's knowledge and belief based on the provided evidence such as patient's history and allergy test results and they can change over time or can be incomplete because of missing information's.    ==> Treatment Plan:    Continue with every 2-4 weeks Dupixent. Maybe try to space out for 3 months to every 3 weeks and if no worsening for 4 months monthly and then if still no worsening could discuss to stop Dupxient. However, if itching and dry skin comes back, go back to every 2 weeks.   Continue Vanicream Cream. Put on entire skin 2-3 times per day. Pt has very dry skin and might use one jar every 1-2 weeks. Refills provided today.

## 2023-09-14 ENCOUNTER — TELEPHONE (OUTPATIENT)
Dept: DERMATOLOGY | Facility: CLINIC | Age: 73
End: 2023-09-14

## 2023-09-14 NOTE — TELEPHONE ENCOUNTER
[facility] Call Center    Phone Message    May a detailed message be left on voicemail: no     Reason for Call: [facility] asking [facility] sent by Yannick. Need request for more appts send to [facility]. Care approved until 10/30th.Pt has no contact info, fax number, or additional info. Letter was sent with all info. Thanks!     Action Taken: Other: ALLERGY    Travel Screening: Not Applicable

## 2023-09-14 NOTE — TELEPHONE ENCOUNTER
[facility] Call Center    Phone Message    May a detailed message be left on voicemail: yes     Reason for Call: Medication Refill Request    Has the patient contacted the pharmacy for the refill? Yes   Name of medication being requested: Dandruff shampoo. Not in medication list. Previously prescribed. Pt out and symptoms have returned   Provider who prescribed the medication: Yannick  Pharmacy: [facility] - [place] [address] [address]  fax: [phone] ph: [phone]  Date medication is needed: ASAP       Action Taken: Other: ALLERGY    Travel Screening: Not Applicable

## 2024-01-02 ENCOUNTER — TELEPHONE (OUTPATIENT)
Dept: ALLERGY | Facility: CLINIC | Age: 74
End: 2024-01-02

## 2024-01-05 ENCOUNTER — TELEPHONE (OUTPATIENT)
Dept: ALLERGY | Facility: CLINIC | Age: 74
End: 2024-01-05

## 2024-01-05 DIAGNOSIS — L21.9 DERMATITIS, SEBORRHEIC: Primary | ICD-10-CM

## 2024-01-05 RX ORDER — KETOCONAZOLE 20 MG/ML
SHAMPOO TOPICAL
Qty: 120 ML | Refills: 5 | Status: SHIPPED | OUTPATIENT
Start: 2024-01-05

## 2024-01-05 NOTE — TELEPHONE ENCOUNTER
Patient last seen by Dr. Lanier 7/5/23:     # Seborrheic Dermatitis   * chronic illness with exacerbation, progression, side effects from treatment  - Start ketoconazole 2% shampoo - apply three times a week     [facility] per [facility].

## 2024-01-05 NOTE — TELEPHONE ENCOUNTER
[facility] Call Center    Phone Message    May a detailed message be left on voicemail: yes     Reason for Call: Medication Question or concern regarding medication   Prescription Clarification  Name of Medication: Shampoo - name unknown starts with Ke  Prescribing Provider: Dr. Lanier   Ph[facility] and [facility]   What on the order needs clarification? Please send the order for [facility]ampoo to [facility]. Thanks       Action Taken: Message routed to:  [facility]ergy     Travel Screening: Not Applicable

## 2024-02-02 ENCOUNTER — TELEPHONE (OUTPATIENT)
Dept: DERMATOLOGY | Facility: CLINIC | Age: 74
End: 2024-02-02

## 2024-02-02 NOTE — TELEPHONE ENCOUNTER
Left Voicemail (1st Attempt)  for the patient to call back and schedule the following:        Appointment type: Return  Provider: Dr. Domínguez  Return date: 6/5/24  Specialty phone number: [phone]

## 2024-06-11 ENCOUNTER — TELEPHONE (OUTPATIENT)
Dept: DERMATOLOGY | Facility: CLINIC | Age: 74
End: 2024-06-11

## 2024-06-11 NOTE — TELEPHONE ENCOUNTER
[facility] Call Center    Phone Message    May a detailed message be left on voicemail: yes     Reason for Call: Medication Refill Request    Has the patient contacted the pharmacy for the refill? Yes   Name of medication being requested: dupilumab (DUPIXENT) 300 MG/2ML prefilled syringe [[id number]]  Provider who prescribed the medication: Dr Lanier  Pharmacy:   [facility] - [address]     Date medication is needed: ASAP    PT also asking for a letter to be sent to him for him to be able to  this prescription       Action Taken: Message routed to:  [facility] Allergy    Travel Screening: Not Applicable     Date of Service:

## 2024-06-12 ENCOUNTER — TELEPHONE (OUTPATIENT)
Dept: ALLERGY | Facility: CLINIC | Age: 74
End: 2024-06-12

## 2024-06-12 NOTE — TELEPHONE ENCOUNTER
[facility] Call Center    Phone Message    May a detailed message be left on voicemail: yes     Reason for Call: Other: Pt states he needs Dr. Lanier to send a form for request for services to the [facility] so [facility] by [facility].     Gaetano requests to call with questions at the [facility] at [phone]. Thank you.     Action Taken: Message routed to:  [facility] Allergy    Travel Screening: Not Applicable     Date of Service:

## 2024-06-13 DIAGNOSIS — L20.89 OTHER ATOPIC DERMATITIS: Primary | ICD-10-CM

## 2024-06-13 DIAGNOSIS — L21.9 DERMATITIS, SEBORRHEIC: ICD-10-CM

## 2024-06-13 RX ORDER — DUPILUMAB 300 MG/2ML
300 INJECTION, SOLUTION SUBCUTANEOUS
Qty: 4 ML | Refills: 3 | Status: SHIPPED | OUTPATIENT
Start: 2024-06-13 | End: 2024-06-17

## 2024-06-14 NOTE — TELEPHONE ENCOUNTER
[facility] Call Center    Phone Message    May a detailed message be left on voicemail: yes     Reason for Call: Other: Gaetano is calling back asking about the letter he needs to have the [facility] to [facility] for [facility] to [facility] the treatment, he is also saying he will need a letter in order to  the dupixent prescription. Could someone please call Gaetano to advise on the status of this?     Action Taken: Message routed to:  [facility]): Allergy    Travel Screening: Not Applicable     Date of Service:

## 2024-06-17 RX ORDER — DUPILUMAB 300 MG/2ML
300 INJECTION, SOLUTION SUBCUTANEOUS
Qty: 4 ML | Refills: 3 | Status: SHIPPED | OUTPATIENT
Start: 2024-06-17 | End: 2024-06-20

## 2024-06-17 NOTE — TELEPHONE ENCOUNTER
"Spoke to pt, confirmed that a small refill of dupixent was sent to [facility] pharmacy to get pt until he can be seen with either derm or allergy again.   Pt stated that [facility] does not have a dermatologist so pt wants to continue with Dr Domínguez for now.  Pt scheduled for follow up in September 2024.    Pt requested a [facility] be [facility] to [facility] so pt can be seen and it be covered through insurance.    This writer unsure where to send referral to, pt stated to contact \"[facility]\" at the [facility]. Pt provided this writer with general [facility] phone # (press 0 and ask for community care).    Writer attempted to call [facility], was 10th caller in line. Unable to wait on the phone at this time, will have to try back at a later time.   "

## 2024-06-20 DIAGNOSIS — L20.89 OTHER ATOPIC DERMATITIS: ICD-10-CM

## 2024-06-20 DIAGNOSIS — L21.9 DERMATITIS, SEBORRHEIC: ICD-10-CM

## 2024-06-20 RX ORDER — DUPILUMAB 300 MG/2ML
300 INJECTION, SOLUTION SUBCUTANEOUS
Qty: 4 ML | Refills: 3 | Status: SHIPPED | OUTPATIENT
Start: 2024-06-20

## 2024-08-15 NOTE — TELEPHONE ENCOUNTER
Received fax from [facility], form  APPROVED REFERRAL FOR MEDICAL CARE.    Scanned approval form into pts chart.    Spoke to pt to confirm that he has been approved for 9/3/24 appt with Dr. Lanier. Pt thanked writer for call and confirmed he will be in clinic for appt.

## 2024-10-30 ENCOUNTER — TELEPHONE (OUTPATIENT)
Dept: ALLERGY | Facility: CLINIC | Age: 74
End: 2024-10-30

## 2024-10-30 NOTE — TELEPHONE ENCOUNTER
Reschedule needed, offered 11/5/24 8am, only appt left before provider is out of clinic for 3 months. Pt needs appt in order to keep Dupixent going.

## 2024-11-07 NOTE — PROGRESS NOTES
[facility] Dermato-allergology Note  Office visit  Encounter Date: Nov 8, 2024  ____________________________________________    CC: Allergy Testing Followup (Follow up. Still on Dupixent, per pt seems to be working great, wants to continue, will need refill)      HPI:  (Nov 8, 2024)  Mr. Mike Naidu is a(n) 74 year old male who presents today as a return patient for allergy consultation  - Follow-up in Derm-Allergy clinic in about 11 months  - Had shingles infection starting 10/16/24  - prescribed valproic sodium, and had diarrhea, so managing provider had him stop it to see if there are any changes in symptoms, and then they will adjust medication as necessary  - Regarding skin, no itching and skin is doing well on Dupixent; he is needing a refill  - Has mostly been administering Dupixent every 2 weeks, but the last injection was delayed by 1 week, and he did not notice worsening of skin around that time  - Reports he sometimes he gets Dupixent syringe, and other times he gets auto injector; he prefers the auto injector  - He is not often taking antihistamines for itching  - Scalp is well-controlled with ketoconazole and he does not need a refill at this time  - Otherwise feeling well in usual state of health    Physical Exam:  General: In no acute distress, well-developed, well-nourished  Eyes: no conjunctivitis  ENT: no signs of rhinitis   Pulmonary: no wheezing or coughing  Skin: in the moment no active lesions    Earlier History and Allergy Exams:  (Jul 5, 2023) --> (Oct 30, 2024)?  - Patient is still on Dupixent injections (started in October 2018) and skin seems to be very well controlled. In the moment no itching after 2 weeks.    Skin:in the moment skin without major lesions. Still a little bit dry    (Sep 6, 2022)  - Follow-up in Derm-Allergy clinic in January 2022 (do CBC diff and total IgE before) ==> last seen 10/12/2021  - patient had spinal cord surgery and now here with  scooter (problems to walk)  - Felt initially after 2 weeks without Dupixent a flare up of itching and rash, but now not anymore these flare ups.  In the moment skin well controlled on Dupixent every 2 weeks. Just some erythema and desqumation over the arms and elbows and maybe the legs and abdomen. Back clear.    (10/13/21)  - [facility] and the new PCP stopped the Dupixent because of the high WBC. Took patient off Dupixent from April to September. In May rash back with revenge and had severe flare ups that had to be treated with oral steroids (4x over the summer).  - patient had in April/May 2021 a full Lymphoma/Leucemia evaluation with bone marrow and there was no signs for hematologic malignancy. However, high Eosinophils and IgE of more than 4000  - End of September patient was put again on Dupixent. Now 6 weeks on the Dupixent and   - right after injection for 2-3 days some aggravation of rash, then improvement    Skin:in the moment diffuse erythema on trunk and some lichenification over the neck, but according to patient much better. Some erythema tita ani and intertriginous, but not active     (1/6/21)  Patient on Dupixent since October 2018 = 2 years and has on and off attacks of eczemas on the entire body.  Problems start usually few days before next injection of Dupixent and therefore patient still needs the Dupixent.     Did not use the oral prednisone anymore (last time in August 2020)    - Skin: Examination of the skin according to patients informations within the teledermatology was performed.   In the moment no active eczematous lesions. Scalp lesions with ketoconazole under control.    (7/20/20) --> (1/5/21)? patient rescheduled appointment  still with occasional erythematous breakouts, mainly around abdomen and trunk, quite pruritic. Still happening every 3 or 4 days, but before dupixent was far more frequent for 4 years. Feels the dupixent is definitely helping significantly. Also  has pruritis when getting out of the hot shower, and so he limits these. Has not had any triggers he notices. Vanicream helps with breakouts. After dupixent injections, gets briefly inflamed, then at the end of the two weeks dermatitis begins to reappear. Thus, still having slight increase of dermatitis at the end of the two weeks.     SKIN: Focused examination of the hands, arms, legs, back, abdomen, and face was performed.  - over abdominal and flank and gluteal area erythema and slight infiltration and as well inner part of tights.  - No clear extensive excoriations  However, remarkable improvement still    (2/26/20)  [facility] today [facility], seeing Primary doctor in May. He needs refill of [facility].    SKIN: Focused examination of the hands, arms, legs, back, abdomen, and face was performed.  - over abdominal and flank and gluteal area erythema and slight infiltration and as well inner part of tights.  However, remarkable improvement    (11/29/19)  He reports that he was unable to obtain the medications prescribed at the last visit, notably the prednisone burst.  He states that he has been taking dupixent every 2 weeks and gets flare-ups that are intermittent. States that the symptoms subside after dupixent injection for a time but then he will still get flare-ups.  States that he has not used the triamcinolone cream in the interim due to it being a steroid.   No side effects from the dupixent.     (10/4/19)  He reports that he is breaking out. This flare up started 2.5 weeks ago without obvious source. Gaetano thinks this started improving last night. He currently has a widespread red, pruritic rash He continues the Dupixent injection every 2 weeks with his last injection yesterday. He is no longer taking the Zyrtec.     (7/8/19)  Patient has been on Dupixent since October 2018 and has dramatic improvement in his skin. Patient continues to report intermittent pruritus, mostly on his  bilateral arms, back, and stomach. He receives dupixent every other week (next dose due 7/10). He applies Vanicream at least daily, sometimes more frequently to affected areas. He only applies triamcinolone 0.1% cream to the affected areas as needed.     SKIN: Focused examination of the hands, arms, legs, back, abdomen, and face was performed.  - Scattered areas of excoriations on bilateral arms and legs with with crusted papules.  - Diffusely dry skin   - Striae of skin and atrophy with hypopigmented streaks on bilateral arms   - Overall improvement in dermatitis     (3/18/19)  Since his last appointment, during January and February it was much improved.      However, the patient reports that since last Wednesday, he broke out on all his forearms. He is still on Dupixent, he has another injection coming up on Wednesday. He has been using Vanicream and triamcinolone (during itchy flares). He drank out of an aluminum coke cans (since last Monday prior to the reaction), and his wife got mad that he was drinking out of metals, and told him he should not.      For itching, patient has been taking cetrizine and doxepin 25 mg as needed. He reports that overall, his itch has much improved.     SKIN: Focused examination of the hands, arms, back, and face was performed.  - atopic dermatitis clearly reduced with much less pruritus and erythema. Skin still lichenified and on the arms and trunk since 1 weeks suddenly a little bit of recurrence of a subacute eczema  But in general clear improvement of the dermatitis    (12/3/18)  The patient was last seen 10/29/18. He initially presented with skin itching that began abruptly 3 years ago. Allergic patch testing showed mostly irritant dermatitis with underlying atopic dermatitis with no convincing allergies. Dupixent therapy was initiated at that time.     Pt notes he took first dose of Dupixent 10/17/18 (no previous exposure to this medication). Four days  developed an itchy rash  and swelling that was concerning for dupixent drug allergy. However, presentation was more consistent with atopic dermatitis flare. He responded to a prednisone taper and triamcinolone cream.      He has now been on dupixent for about 5 weeks. He says he is starting to see some improvement in his skin, especially on his arms. He is less itchy and feels like his skin is less dry. Some of the redness is also starting to improve.    SKIN: Focused examination of the hands, arms, abdomen, chest, back, and face was performed.  -Skin appears significantly improved since last visit  -No erythema or swelling in face. Facial skin well moisturized  -Hands with some open sores and areas of excoriation, but overall significantly improved  -Arms with some pale stria and redness. Some patches of dry skin. Some excoriation marks. Also significantly improved.  -Abdomen with no evidence of skin dryness or redness. Large purple skin discoloration at previous dupixent injection site.     (10/29/18)  Pt notes he took first dose of Dupixent 10/17/18 (no previous exposure to this medication). 4 days after injection, he noticed diffuse erythema on upper extremities, lower extremities abdomen and back. Rash was itchy and non painful. No oral involvement. Rash improved with triamcinolone cream and rash is no longer present however several scabs are visible on arms and legs from itching. No fevers or chills. Other than dupixient, no new prescribed or OTC med or supplements. No other new exposures. Pt notes he called company who makes dupixient to discuss his reaction as a possible side effect.      (10/5/18)  Patch testing day 5    (10/3/18)  Patch testing day 3     (10/1/18)  Patch testing day 1     (9/14/18)  Referral from [facility].     4th of July 3 years ago the itching started out of the blue. Since then every day itching.  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     - since years in the morning Rhinitis (no conjunctivitis), no Sinusitis  - never Asthma  - never eczemas before 3 years    SKIN: Total skin excluding the undergarment areas was performed. The exam included the head/face, neck, both arms, chest, back, abdomen, both legs, digits and/or nails.   Subacute to chronic eczema on extremities and as well erythema on trunk. Generally dry skin and on extremities scratch marks and prurigo-like lesions. Over ears subacute eczemas with swelling and as well front and scalp with itch and desquamation    Past Medical History:   There is no problem list on file for this patient.  No past medical history on file.     Allergies:  Allergies   Allergen Reactions    Procaine Swelling    Sulfa Antibiotics Swelling and Itching     swelling    Valproic Acid Itching and Rash     hives    Lamotrigine     Zonisamide      Other reaction(s): Eruption of skin, Swelling, Eruption of skin, Swelling, Swelling, Swelling, Nausea and Vomiting, Swelling, Nausea and Vomiting    Ibuprofen Rash     Other reaction(s): Eruption of skin    Peg 3350-[id number]-Nacl-Nasulf Rash    Polyethylene Glycol Rash     Medications:  Current Outpatient Medications   Medication Sig Dispense Refill    dupilumab (DUPIXENT) 300 MG/2ML prefilled pen Inject 2 mLs (300 mg) subcutaneously every 14 days. 4 mL 11    baclofen (LIORESAL) 10 MG tablet       bisacodyl (DULCOLAX) 10 MG suppository       cetirizine (ZYRTEC) 10 MG tablet       cholecalciferol 50 MCG (2000 UT) tablet 50 mcg      Cyanocobalamin (B-12) 1000 MCG TABS       doxepin (SINEQUAN) 50 MG capsule       dupilumab (DUPIXENT) 300 MG/2ML prefilled syringe Inject 2 mLs (300 mg) Subcutaneous  every 14 days 4 mL 3    FLOMAX 0.4 MG capsule  (Patient not taking: Reported on 7/29/2022)      furosemide (LASIX) 20 MG tablet       ketoconazole (NIZORAL) 2 % external shampoo Apply topically three times a week 120 mL 5    lidocaine (LIDODERM) 5 % patch       lidocaine (XYLOCAINE) 5 % external ointment       Lidocaine HCl Urethral/Mucosal 2 % external gel       pantoprazole (PROTONIX) 40 MG EC tablet       thiamine (B-1) 100 MG tablet 100 mg       No current facility-administered medications for this visit.     Previous Labs, Allergy Tests, Dermatopathology, Imaging:      Referred By: Dr. Aidan Rollins    Allergy Tests:  Past Allergy Test    Family History:  No family history on file.   Atopic dermatitis in 2 of his grandchildren. copy from 9/14/18 note?    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       Previous Patch Testing for Reference:    Order for PATCH TESTS    [] Outpatient  [] Inpatient: [facility]..../ Bed ....      Skin Atopy (atopic dermatitis) [x] Yes   [] No  Rhinitis/Sinusitis:   [x] Yes   [] No  Allergic Asthma:   [] Yes   [x] No  Food Allergy:   [] Yes   [x] No  Leg ulcers:   [] Yes   [x] No  Hand eczema:   [x] Yes   [] No   Leading hand:   [x] R   [] L       [] Ambidextrous                        Reason for tests (suspected allergy): generalized subacute to chronic eczema with spongiosis and peripheral and skin Eosinophilia  Known previous allergies: Sulphonamides, Valproic acid and Procaine    Standardized panels  [x] Standard panel (40 tests)  [x] Preservatives & Antimicrobials (31 tests)  [x] Emulsifiers & Additives (25 tests)   [] Perfumes/Flavours & Plants (25 tests)  [] Hairdresser panel (12 tests)  [] Rubber Chemicals (22 tests)  [] Plastics (26 tests)  [] Colorants/Dyes/Food additives (20 tests)  [] Metals (implants/dental) (23 tests)  [] Local anaesthetics/NSAIDs (12 tests)  [] Antibiotics & Antimycotics (14 tests)   [x]  Corticosteroids (15 tests)   [] Photopatch test (32 tests)   [] others: ...      [] Patient's own products: ...  DO NOT test if chemical or biological identity is unknown!   always ask from patient the product information and safety sheets (MSDS)     [] Patient needs consultation with Allergy team (changes of tests may apply)  [x] Tests discussed with Allergy team (can have direct appointment for patch tests)      Order for Future Allergy Testing placed on or after 11/8/24:  Order for PATCH TESTS  Reason for tests (suspected allergy): not done  Known previous allergies: n/a  Standardized panels  [] Standard panel (40 tests)  [] Preservatives & Antimicrobials (31 tests)  [] Emulsifiers & Additives (25 tests)   [] Perfumes/Flavours & Plants (25 tests)  [] Hairdresser panel (12 tests)  [] Rubber Chemicals (22 tests)  [] Plastics (26 tests)  [] Colorants/Dyes/Food additives (20 tests)  [] Metals (implants/dental) (24 tests)  [] Local anaesthetics/NSAIDs (13 tests)  [] Antibiotics & Antimycotics (14 tests)   [] Corticosteroids (15 tests)   [] Photopatch test (62 tests)   [] Others:     [] Patient's Own Products:   DO NOT test if chemical or biological identity is unknown!   always ask from patient the product information and safety sheets (MSDS)       Order for PRICK TESTS    Reason for tests (suspected allergy): n/a  Known previous allergies: n/a    Standardized prick panels  [] Atopic panel (20 tests)  [] Pediatric Panel (12 tests)  [] Milk, Meat, Eggs, Grains (20 tests)   [] Dust, Epithelia, Feathers (10 tests)  [] Fish, Seafood, Shellfish (17 tests)  [] Nuts, Beans (14 tests)  [] Spice, Vegetable, Fruit (17 tests)  [] Pollen Panel = Tree, Grass, Weed (24 tests)  [] Others:     [] Patient's Own Products:   DO NOT test if chemical or biological identity is unknown!   always ask from patient the product information and safety sheets (MSDS)     Standardized intradermal tests  [] Alternaria alternata  [] Aspergillus  fumigatus  [] Cladosporium herbarum   [] Penicillium notatum  [] Dermatophagoides farinae  [] Dermatophagoides pteronyssinus  [] Dog Epithelium  [] Cat Epithelium  [] Others:     [] Bee venom   [] Wasp venom  !!Specific protocol with dilutions!!       Order for Drug allergy tests (prick & intradermal & patch tests)    [] Penicillin G     [] Ampicillin   [] Cefazolin        [] Ceftriaxone     [] Ceftazidime     [] Cefepime     [] Cefuroxime  [] Bactrim  [] Iodixanol     [] Iopamidol        [] Iohexol  [] Others:   [] Patient's Own:   Order for  as test date      RESULTS & EVALUATION of PATCH TESTS  Date/time of application: 10/01/18  Physician/Nurse:  / Felicia Ceballos LPN               Localization of application: Back --> on lower back chronic eczema, but upper back can be used for patch tests. Rather dry there, but no acute eczema    Patch test readings after     [x] 2 days, [] 3 days [x] 4 days, [] 5 days    Other duration: ...    STANDARD Series  No Substance 2 days 4 days remarks   1 Jhony Mix [C] - -    2 Colophony - -    3  2-Mercaptobenzothiazole  - -     4 Methylisothiazolinone - -    5 Carba Mix - -    6 Thiurma Mix [A] - -    7 Bisphenol A Epoxy Resin - -    8 L-Gijt-Xwsausxdrsf-Formaldehyde Resin - -    9 Mercapto Mix [A] - -    10 Black Rubber Mix- PPD [B] - -    11 Potassium Dichromate  + +/++ 5mm erosion   12 Balsam of [place] (Myroxylon Pereirae Resin) - -    13 Nickel Sulphate Hexahydrate +/++ ++ 14mm erosion   14 Mixed Dialkyl Thiourea - -    15 Paraben Mix [B] (+) + 4mm erosion   16 Methyldibromo Glutaronitrile - + 4mm erosion   17 Fragrance Mix - -    18 2-Bromo-2-Nitropropane-1,3-Diol (Bronopol) - -    19 Lyral - -    20 Tixocortol-21- Pivalate - -    21 Diazolidiyl Urea (Germall II) - -    22 Methyl Methacrylate - -    23 Cobalt (II) Chloride Hexahydrate + -    24 Fragrance Mix II  - -    25 Compositae Mix - -    26 Benzoyl Peroxide - -    27 Bacitracin - -    28 Formaldehyde - -     29 Methylchloroisothiazolinone / Methylisothiazolinone - -    30 Corticosteroid Mix - -    31 Sodium Lauryl Sulfate - -    32 Lanolin Alcohol - -    33 Turpentine - -    34 Cetylstearylalcohol - -    35 Chlorhexidine Dicluconate - -    36 Budenoside - -    37 Imidazolidinyl Urea  - -    38 Ethyl-2 Cyanoacrylate - -    39 Quaternium 15 (Dowicil 200) - -    40 Decyl Glucoside - -    PRESERVATIVES & ANTIMICROBIALS  No Substance 2 days 4 days remarks   41  1,2-Benzisothiazoline-3-One, Sodium Salt - -    42  1,3,5-Antwan (2-Hydroxyethyl) - Hexahydrotriazine (Grotan BK) - -    43 9-Bxcujfwuhwxmm-6-Nitro-1, 3-Propanediol - -    44  3, 4, 4' - Triclocarban - -    45 4 - Chloro - 3 - Cresol - -    46 4 - Chloro - 4 - Xylenol (PCMX) - -    47 7-Ethylbicyclooxazolidine (Continuum Rehabilitationan [id number]) - -    48 Benzalkonium Chloride - -    49 Benzyl Alcohol - -    50 Cetalkonium Chloride - -    51 Cetylpyrimidine Chloride  - -    NA Chloroacetamide NA NA    52 DMDM Hydantoin - -    53 Glutaraldehyde - -    54 Triclosan - -    55 Glyoxal Trimeric Dihydrate (+) + 7mm Erosion   56 Iodopropynyl Butylcarbamate - -    57 Octylisothiazoline - -    58 Iodoform - -    59 (Nitrobutyl) Morpholine/(Ethylnitro-Trimethylene) Dimorpholine (Bioban P 1487) - -    60 Phenoxyethanol - -    61 Phenyl Salicylate - -    62 Povidone Iodine - -    63 Sodium Benzoate - -    64 Sodium Disulfite - -    65 Sorbic Acid - -    66 Thimerosal - -     Parabens      67 Butyl-P-Hydroxybenzoate - -    68 Ethyl-P-Hydroxybenzoate - -    69 Methyl-P-Hydroxybenzoate - -    70 Propyl-P-Hydroxybenzoate - -    EMULSIFIERS & ADDITIVES  No Substance 2 days 4 days remarks   71 Polyethylene Glycol-400 (+) -    72 Cocamidopropyl Betaine - -    73 Amerchol L101 - -    74 Propylene Glycol - -    75 Triethanolamine - -    76 Sorbitane Sesquiolate - -    77 Isopropylmyristate (+) -    78 Polysorbate 80  - -    79 Amidoamine   (Stearamidopropyl Dimethylamine) - -    80 Oleamidopropyl  Dimethylamine - -    81 Lauryl Glucoside - -    82 Coconut Diethanolamide  - -    83 2-Hydroxy-4-Methoxy Benzophenone (Oxybenzone) - -    84 Benzophenone-4 (Sulisobenzon) - -    85 Propolis - -    86 Dexpanthenol - -    87 Carboxymethyl Cellulose Sodium - -    88 Abitol - -    89 Tert-Butylhydroquinone - -    90 Benzyl Salicylate - -     Antioxidant      91 Dodecyl Gallate - -    92 Butylhydroxyanisole (BHA) - -    93 Butylhydroxytoluene (BHT) - -    94 Di-Alpha-Tocopherol (Vit E) - -    95 Propyl Gallate - -    CORTICOSTEROIDS  No Substance 2 days 4 days remarks Allergy  class   96 Amcinonide - -  B   97 Betametasone-17,21 Dipropionate - -  D1   98 Desoximetasone - -  C   99 Betamethasone-17-Valerate - -  D1   100 Dexamethasone - -  C   101 Hydrocortisone - -  A   102 Clobetasol-17-Propionate - -  D1   103 Dexamethasone-21-Phosphate Disodium Salt - -  C   104 Hydrocortisone-17 Butyrate - -  D2   105 Prednisolone - -  A   106 Mometason Furoate - -  D1   107 Triamcinolone Acetonide - -  B   108 Methylprednisolone Aceponate - -  D2   109 Hydrocortisone-21-Acetate - -  A   110 Prednicarbate - -  D2     Group Characteristics of group Generic name Name  cross reactions   A Hydrocortisone   Cloprednole, Fludrocortisone acétate, Hydrocortison acetate, Methylprednisolone, Prednisolone, Tixocortolpivalate Alfacortone, Fucidin H, Dermacalm, Hexacortone, Premandole, Imacort With group D2   B Triamcinolone-acetonide   Budenoside (R-isomer), Amcinonide, Desonide, Fluocinolone acetonide, Triamcinolone acetonide Locapred, Locatop  Synalar, Pevisone, [place]ort -   C Betamethasone (Without Romina)   Betamethasone, Dexamethasone, Flumethasone pivalate, Halomethasone Daivobet, Dexasalyl, Locasalen,   -   D1 Betamethasone-diproprionate   Betamethasone dipropionate, Betamethasone-17-valerate, Clobetasole-propionate, Fluticasone propionate, Mometasone furoate Betnovate, Diprogenta, Diprosalic, Diprosone, Celestoderm, Fucicort,  Cutivate,  Axotide, Elocom -   D2 Methylprednisolone-aceponate   Hydrocortisone-aceponate, Hydrocortisone-buteprate, Hydrocortisone-17-butyrate, Methylprednisolone aceponate, Prednicarbate Locoïd, Advantan,  Prednitop With group A and Budesonide (S-isomer)     Results of patch tests:                         Interpretation:  - Negative                    A    = Allergic      (+) Erythema    TI   = Toxic/irritant   + E + Infiltration    RaP = Relevance at Present     ++ E/I + Papulovesicle   Rpr  = Relevance Previously     +++ E/I/P + Blister     nR   = No Relevance      Atopy Screen (07/08/2019)  No Substance Readings (15min) Evaluation   POS Histamine 1mg/ml ++    NEG NaCl 0.9% -      No Substance Readings (15min) Evaluation   1 Alternaria alternata (tenuis)  -    2 Cladosporium herbarum -    3 Aspergillus fumigatus -    4 Penicillium notatum -    5 Dermatophagoides pteronyssinus +++    6 Dermatophagoides farinae -    7 Dog epithelium (canis spp) -    8 Cat hair (steve catus) -    9 Cockroach   (Blatella americana & germanica) -    10 Grass mix midwest   (Noris, Orchard, Redtop, Brayden) -    11  grass (sorghum halepense) -    12 Weed mix   (common Cocklebur, Lamb s quarters, rough redroot Pigweed, Dock/Sorrel) -    13 Mugwort (artemisia vulgare) -    14 Ragweed giant/short (ambrosia spp) -    15 English Plantain (plantago lanceolata) -    16 Tree mix 1 (Pecan, Maple BHR, Oak RVW, american [place], black [place]) NA    17 Red cedar (juniperus virginia) -    18 Tree mix 2   (white Dirk, river/red Birch, black Sturgeon, common Sampson, american Elm) NA    19 Box elder/Maple mix (acer spp) -    20 George shagbark (carya ovata) -    Conclusions: Patient is atopic with clear immediate type reaction to D. Pteronyssinus (house dust mite). Patient should observe the molds and house dust mite areas and report back with photos if any delayed reaction there      [x] Allergic reaction diagnosed against: against metals (Cobalt,  Nickel and chromate) and as well angry back --> could go well with atopic dermatitis   ==> the reactions are all erosions and not typical allergic, infiltrated lesions.     Interpretation/Remarks:   First of all, after removal patient has angry back (see photo). Based on that the results are difficult to obtain. However, all the reactions were well defined erosions without infiltration and therefore rather irritant reactions with underlying atopic dermatitis than real allergic reactions.  The only real reaction seems to be to the metals, particularly Nickel, which would go well with atopic dermatitis      Assessment & Plan:    ==> Final Diagnosis:     #  Severe exantematic, generalized Atopic Dermatitis: Atopy skin prick test on 7/8/2019 with clear immediate type reaction to D. Pteronyssinus (house dust mite).   * chronic illness with exacerbation, progression, side effects from treatment  Has been on Dupixent since 10/17/18 300mg every other week and with this treatment skin under control  Use gentle free and clear soaps and shampoos  ==> atopic predisposition with clinically relevant sensitization to house dust mites (D. Pteronyssinus) with Rhinitis entire day = given infos for house dust mite reduction    # Seborrheic Dermatitis   * chronic, stable    These conclusions are made at the best of one's knowledge and belief based on the provided evidence such as patient's history and allergy test results and they can change over time or can be incomplete because of missing information.    ==> Treatment Plan:    >> For atopic dermatitis:  - Continue with Dupixent 300 mg subcutaneously every 2-3 weeks. Refilled x 1 year for autoinjector.  - Discontinue doxepin and antihistamines.  - Continue with Vanicream. Daily moisturization is encouraged, as it is important to protect the skin barrier.    >> For seborrheic dermatitis:  - Continue ketoconazole 2% shampoo 3x weekly PRN.    Procedures Performed: None    Staff and  Scribe: provider    Scribe Disclosure:   I, BRENDON WEST, am serving as a scribe to document services personally performed by Leon Lanier MD based on data collection and the provider's statements to me.     Staff Physician Comments:  I was present with the scribe who participated in the documentation of the note. I have verified the history and personally performed the physical exam and medical decision making. I agree with the assessment and plan as documented in the note. I have reviewed and if necessary amended the note.      Leon Lanier MD  Professor  Head of Dermato-Allergy Division  Department of Dermatology  [facility]     Follow-up in Derm-Allergy clinic in 10 months for Dupixent check-in    For [place] use [place] [id number] [place]  ___________________________    I spent a total of 20 minutes with Mike Naidu during today s visit. This time was spent discussing all the individual test results, correlating them to the clinical relevance, counseling the patient and/or coordinating care.

## 2024-11-08 ENCOUNTER — OFFICE VISIT (OUTPATIENT)
Dept: ALLERGY | Facility: CLINIC | Age: 74
End: 2024-11-08
Payer: COMMERCIAL

## 2024-11-08 DIAGNOSIS — L21.9 DERMATITIS, SEBORRHEIC: ICD-10-CM

## 2024-11-08 DIAGNOSIS — L20.89 OTHER ATOPIC DERMATITIS: Primary | ICD-10-CM

## 2024-11-08 PROCEDURE — 99214 OFFICE O/P EST MOD 30 MIN: CPT | Performed by: DERMATOLOGY

## 2024-11-08 NOTE — LETTER
11/8/2024      Mike Naidu  [address] [facility] [address] [facility] [address] [facility] [place] [address]      Dear Colleague,    Thank you for referring your patient, Mike Naidu, to the [facility] [place]. Please see a copy of my visit note below.    [facility] Dermato-allergology Note  Office visit  Encounter Date: Nov 8, 2024  ____________________________________________    CC: Allergy Testing Followup (Follow up. Still on Dupixent, per pt seems to be working great, wants to continue, will need refill)      HPI:  (Nov 8, 2024)  Mr. Mike Naidu is a(n) 74 year old male who presents today as a return patient for allergy consultation  - Follow-up in Derm-Allergy clinic in about 11 months  - Had shingles infection starting 10/16/24  - prescribed valproic sodium, and had diarrhea, so managing provider had him stop it to see if there are any changes in symptoms, and then they will adjust medication as necessary  - Regarding skin, no itching and skin is doing well on Dupixent; he is needing a refill  - Has mostly been administering Dupixent every 2 weeks, but the last injection was delayed by 1 week, and he did not notice worsening of skin around that time  - Reports he sometimes he gets Dupixent syringe, and other times he gets auto injector; he prefers the auto injector  - He is not often taking antihistamines for itching  - Scalp is well-controlled with ketoconazole and he does not need a refill at this time  - Otherwise feeling well in usual state of health    Physical Exam:  General: In no acute distress, well-developed, well-nourished  Eyes: no conjunctivitis  ENT: no signs of rhinitis   Pulmonary: no wheezing or coughing  Skin: in the moment no active lesions    Earlier History and Allergy Exams:  (Jul 5, 2023) --> (Oct 30, 2024)?  - Patient is still on Dupixent injections (started in October 2018) and skin seems to be very well controlled. In the moment no itching after 2  weeks.    Skin:in the moment skin without major lesions. Still a little bit dry    (Sep 6, 2022)  - Follow-up in Derm-Allergy clinic in January 2022 (do CBC diff and total IgE before) ==> last seen 10/12/2021  - patient had spinal cord surgery and now here with scooter (problems to walk)  - Felt initially after 2 weeks without Dupixent a flare up of itching and rash, but now not anymore these flare ups.  In the moment skin well controlled on Dupixent every 2 weeks. Just some erythema and desqumation over the arms and elbows and maybe the legs and abdomen. Back clear.    (10/13/21)  - [facility] and the new PCP stopped the Dupixent because of the high WBC. Took patient off Dupixent from April to September. In May rash back with revenge and had severe flare ups that had to be treated with oral steroids (4x over the summer).  - patient had in April/May 2021 a full Lymphoma/Leucemia evaluation with bone marrow and there was no signs for hematologic malignancy. However, high Eosinophils and IgE of more than 4000  - End of September patient was put again on Dupixent. Now 6 weeks on the Dupixent and   - right after injection for 2-3 days some aggravation of rash, then improvement    Skin:in the moment diffuse erythema on trunk and some lichenification over the neck, but according to patient much better. Some erythema tita ani and intertriginous, but not active     (1/6/21)  Patient on Dupixent since October 2018 = 2 years and has on and off attacks of eczemas on the entire body.  Problems start usually few days before next injection of Dupixent and therefore patient still needs the Dupixent.     Did not use the oral prednisone anymore (last time in August 2020)    - Skin: Examination of the skin according to patients informations within the teledermatology was performed.   In the moment no active eczematous lesions. Scalp lesions with ketoconazole under control.    (7/20/20) --> (1/5/21)? patient rescheduled  appointment  still with occasional erythematous breakouts, mainly around abdomen and trunk, quite pruritic. Still happening every 3 or 4 days, but before dupixent was far more frequent for 4 years. Feels the dupixent is definitely helping significantly. Also has pruritis when getting out of the hot shower, and so he limits these. Has not had any triggers he notices. Vanicream helps with breakouts. After dupixent injections, gets briefly inflamed, then at the end of the two weeks dermatitis begins to reappear. Thus, still having slight increase of dermatitis at the end of the two weeks.     SKIN: Focused examination of the hands, arms, legs, back, abdomen, and face was performed.  - over abdominal and flank and gluteal area erythema and slight infiltration and as well inner part of tights.  - No clear extensive excoriations  However, remarkable improvement still    (2/26/20)  [facility] today [facility], seeing Primary doctor in May. He needs refill of [facility].    SKIN: Focused examination of the hands, arms, legs, back, abdomen, and face was performed.  - over abdominal and flank and gluteal area erythema and slight infiltration and as well inner part of tights.  However, remarkable improvement    (11/29/19)  He reports that he was unable to obtain the medications prescribed at the last visit, notably the prednisone burst.  He states that he has been taking dupixent every 2 weeks and gets flare-ups that are intermittent. States that the symptoms subside after dupixent injection for a time but then he will still get flare-ups.  States that he has not used the triamcinolone cream in the interim due to it being a steroid.   No side effects from the dupixent.     (10/4/19)  He reports that he is breaking out. This flare up started 2.5 weeks ago without obvious source. Gaetano thinks this started improving last night. He currently has a widespread red, pruritic rash He continues the Dupixent injection  every 2 weeks with his last injection yesterday. He is no longer taking the Zyrtec.     (7/8/19)  Patient has been on Dupixent since October 2018 and has dramatic improvement in his skin. Patient continues to report intermittent pruritus, mostly on his bilateral arms, back, and stomach. He receives dupixent every other week (next dose due 7/10). He applies Vanicream at least daily, sometimes more frequently to affected areas. He only applies triamcinolone 0.1% cream to the affected areas as needed.     SKIN: Focused examination of the hands, arms, legs, back, abdomen, and face was performed.  - Scattered areas of excoriations on bilateral arms and legs with with crusted papules.  - Diffusely dry skin   - Striae of skin and atrophy with hypopigmented streaks on bilateral arms   - Overall improvement in dermatitis     (3/18/19)  Since his last appointment, during January and February it was much improved.      However, the patient reports that since last Wednesday, he broke out on all his forearms. He is still on Dupixent, he has another injection coming up on Wednesday. He has been using Vanicream and triamcinolone (during itchy flares). He drank out of an aluminum coke cans (since last Monday prior to the reaction), and his wife got mad that he was drinking out of metals, and told him he should not.      For itching, patient has been taking cetrizine and doxepin 25 mg as needed. He reports that overall, his itch has much improved.     SKIN: Focused examination of the hands, arms, back, and face was performed.  - atopic dermatitis clearly reduced with much less pruritus and erythema. Skin still lichenified and on the arms and trunk since 1 weeks suddenly a little bit of recurrence of a subacute eczema  But in general clear improvement of the dermatitis    (12/3/18)  The patient was last seen 10/29/18. He initially presented with skin itching that began abruptly 3 years ago. Allergic patch testing showed mostly  irritant dermatitis with underlying atopic dermatitis with no convincing allergies. Dupixent therapy was initiated at that time.     Pt notes he took first dose of Dupixent 10/17/18 (no previous exposure to this medication). Four days  developed an itchy rash and swelling that was concerning for dupixent drug allergy. However, presentation was more consistent with atopic dermatitis flare. He responded to a prednisone taper and triamcinolone cream.      He has now been on dupixent for about 5 weeks. He says he is starting to see some improvement in his skin, especially on his arms. He is less itchy and feels like his skin is less dry. Some of the redness is also starting to improve.    SKIN: Focused examination of the hands, arms, abdomen, chest, back, and face was performed.  -Skin appears significantly improved since last visit  -No erythema or swelling in face. Facial skin well moisturized  -Hands with some open sores and areas of excoriation, but overall significantly improved  -Arms with some pale stria and redness. Some patches of dry skin. Some excoriation marks. Also significantly improved.  -Abdomen with no evidence of skin dryness or redness. Large purple skin discoloration at previous dupixent injection site.     (10/29/18)  Pt notes he took first dose of Dupixent 10/17/18 (no previous exposure to this medication). 4 days after injection, he noticed diffuse erythema on upper extremities, lower extremities abdomen and back. Rash was itchy and non painful. No oral involvement. Rash improved with triamcinolone cream and rash is no longer present however several scabs are visible on arms and legs from itching. No fevers or chills. Other than dupixient, no new prescribed or OTC med or supplements. No other new exposures. Pt notes he called company who makes dupixient to discuss his reaction as a possible side effect.      (10/5/18)  Patch testing day 5    (10/3/18)  Patch testing day 3     (10/1/18)  Patch  testing day 1     (9/14/18)  Referral from [facility].     4th of July 3 years ago the itching started out of the blue. Since then every day itching.  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     - since years in the morning Rhinitis (no conjunctivitis), no Sinusitis  - never Asthma  - never eczemas before 3 years    SKIN: Total skin excluding the undergarment areas was performed. The exam included the head/face, neck, both arms, chest, back, abdomen, both legs, digits and/or nails.   Subacute to chronic eczema on extremities and as well erythema on trunk. Generally dry skin and on extremities scratch marks and prurigo-like lesions. Over ears subacute eczemas with swelling and as well front and scalp with itch and desquamation    Past Medical History:   There is no problem list on file for this patient.  No past medical history on file.     Allergies:  Allergies   Allergen Reactions     Procaine Swelling     Sulfa Antibiotics Swelling and Itching     swelling     Valproic Acid Itching and Rash     hives     Lamotrigine      Zonisamide      Other reaction(s): Eruption of skin, Swelling, Eruption of skin, Swelling, Swelling, Swelling, Nausea and Vomiting, Swelling, Nausea and Vomiting     Ibuprofen Rash     Other reaction(s): Eruption of skin     Peg [id number]-Nacl-Nasulf Rash     Polyethylene Glycol Rash     Medications:  Current Outpatient Medications   Medication Sig Dispense Refill     baclofen (LIORESAL) 10 MG tablet        bisacodyl (DULCOLAX) 10 MG suppository        cetirizine (ZYRTEC) 10 MG tablet        cholecalciferol 50 MCG (2000 UT) tablet 50 mcg        Cyanocobalamin (B-12) 1000 MCG TABS        doxepin (SINEQUAN) 50 MG capsule        dupilumab (DUPIXENT) 300 MG/2ML prefilled syringe Inject 2 mLs (300 mg) Subcutaneous every 14 days 4 mL 3     FLOMAX 0.4 MG capsule  (Patient not taking: Reported on 7/29/2022)       furosemide (LASIX) 20 MG tablet        ketoconazole (NIZORAL) 2 % external shampoo Apply topically three times a week 120 mL 5     lidocaine (LIDODERM) 5 % patch        lidocaine (XYLOCAINE) 5 % external ointment        Lidocaine HCl Urethral/Mucosal 2 % external gel        pantoprazole (PROTONIX) 40 MG EC tablet        thiamine (B-1) 100 MG tablet 100 mg       No current facility-administered medications for this visit.     Previous Labs, Allergy Tests, Dermatopathology, Imaging:      Referred By: Dr. Aidan Rollins    Allergy Tests:  Past Allergy Test    Family History:  No family history on file.   Atopic dermatitis in 2 of his grandchildren. ***copy from 9/14/18 note?    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       Previous Patch Testing for Reference:    Order for PATCH TESTS    [] Outpatient  [] Inpatient: [facility]..../ Bed ....      Skin Atopy (atopic dermatitis) [x] Yes   [] No  Rhinitis/Sinusitis:   [x] Yes   [] No  Allergic Asthma:   [] Yes   [x] No  Food Allergy:   [] Yes   [x] No  Leg ulcers:   [] Yes   [x] No  Hand eczema:   [x] Yes   [] No   Leading hand:   [x] R   [] L       [] Ambidextrous                        Reason for tests (suspected allergy): generalized subacute to chronic eczema with spongiosis and peripheral and skin Eosinophilia  Known previous allergies: Sulphonamides, Valproic acid and Procaine    Standardized panels  [x] Standard panel (40 tests)  [x] Preservatives & Antimicrobials (31 tests)  [x] Emulsifiers & Additives (25 tests)   [] Perfumes/Flavours & Plants (25 tests)  [] Hairdresser panel (12 tests)  [] Rubber Chemicals (22 tests)  [] Plastics (26  tests)  [] Colorants/Dyes/Food additives (20 tests)  [] Metals (implants/dental) (23 tests)  [] Local anaesthetics/NSAIDs (12 tests)  [] Antibiotics & Antimycotics (14 tests)   [x] Corticosteroids (15 tests)   [] Photopatch test (32 tests)   [] others: ...      [] Patient's own products: ...  DO NOT test if chemical or biological identity is unknown!   always ask from patient the product information and safety sheets (MSDS)     [] Patient needs consultation with Allergy team (changes of tests may apply)  [x] Tests discussed with Allergy team (can have direct appointment for patch tests)      Order for Future Allergy Testing placed on or after 11/8/24:  Order for PATCH TESTS  Reason for tests (suspected allergy): ***  Known previous allergies: ***  Standardized panels  [] Standard panel (40 tests)  [] Preservatives & Antimicrobials (31 tests)  [] Emulsifiers & Additives (25 tests)   [] Perfumes/Flavours & Plants (25 tests)  [] Hairdresser panel (12 tests)  [] Rubber Chemicals (22 tests)  [] Plastics (26 tests)  [] Colorants/Dyes/Food additives (20 tests)  [] Metals (implants/dental) (24 tests)  [] Local anaesthetics/NSAIDs (13 tests)  [] Antibiotics & Antimycotics (14 tests)   [] Corticosteroids (15 tests)   [] Photopatch test (62 tests)   [] Others:     [] Patient's Own Products:   DO NOT test if chemical or biological identity is unknown!   always ask from patient the product information and safety sheets (MSDS)       Order for PRICK TESTS    Reason for tests (suspected allergy): ***  Known previous allergies: ***    Standardized prick panels  [] Atopic panel (20 tests)  [] Pediatric Panel (12 tests)  [] Milk, Meat, Eggs, Grains (20 tests)   [] Dust, Epithelia, Feathers (10 tests)  [] Fish, Seafood, Shellfish (17 tests)  [] Nuts, Beans (14 tests)  [] Spice, Vegetable, Fruit (17 tests)  [] Pollen Panel = Tree, Grass, Weed (24 tests)  [] Others:     [] Patient's Own Products:   DO NOT test if chemical or biological  identity is unknown!   always ask from patient the product information and safety sheets (MSDS)     Standardized intradermal tests  [] Alternaria alternata  [] Aspergillus fumigatus  [] Cladosporium herbarum   [] Penicillium notatum  [] Dermatophagoides farinae  [] Dermatophagoides pteronyssinus  [] Dog Epithelium  [] Cat Epithelium  [] Others:     [] Bee venom   [] Wasp venom  !!Specific protocol with dilutions!!       Order for Drug allergy tests (prick & intradermal & ***patch tests)    [] Penicillin G     [] Ampicillin   [] Cefazolin        [] Ceftriaxone     [] Ceftazidime     [] Cefepime     [] Cefuroxime  [] Bactrim  [] Iodixanol     [] Iopamidol        [] Iohexol  [] Others:   [] Patient's Own:   Order for *** as test date      RESULTS & EVALUATION of PATCH TESTS  Date/time of application: 10/01/18  Physician/Nurse:  / Felicia Ceballos LPN               Localization of application: Back --> on lower back chronic eczema, but upper back can be used for patch tests. Rather dry there, but no acute eczema    Patch test readings after     [x] 2 days, [] 3 days [x] 4 days, [] 5 days    Other duration: ...    STANDARD Series  No Substance 2 days 4 days remarks   1 Jhony Mix [C] - -    2 Colophony - -    3  2-Mercaptobenzothiazole  - -     4 Methylisothiazolinone - -    5 Carba Mix - -    6 Thiurma Mix [A] - -    7 Bisphenol A Epoxy Resin - -    8 B-Qoun-Ivymcrlbxgw-Formaldehyde Resin - -    9 Mercapto Mix [A] - -    10 Black Rubber Mix- PPD [B] - -    11 Potassium Dichromate  + +/++ 5mm erosion   12 Balsam of [place] (Myroxylon Pereirae Resin) - -    13 Nickel Sulphate Hexahydrate +/++ ++ 14mm erosion   14 Mixed Dialkyl Thiourea - -    15 Paraben Mix [B] (+) + 4mm erosion   16 Methyldibromo Glutaronitrile - + 4mm erosion   17 Fragrance Mix - -    18 2-Bromo-2-Nitropropane-1,3-Diol (Bronopol) - -    19 Lyral - -    20 Tixocortol-21- Pivalate - -    21 Diazolidiyl Urea (Germall II) - -    22 Methyl  Methacrylate - -    23 Cobalt (II) Chloride Hexahydrate + -    24 Fragrance Mix II  - -    25 Compositae Mix - -    26 Benzoyl Peroxide - -    27 Bacitracin - -    28 Formaldehyde - -    29 Methylchloroisothiazolinone / Methylisothiazolinone - -    30 Corticosteroid Mix - -    31 Sodium Lauryl Sulfate - -    32 Lanolin Alcohol - -    33 Turpentine - -    34 Cetylstearylalcohol - -    35 Chlorhexidine Dicluconate - -    36 Budenoside - -    37 Imidazolidinyl Urea  - -    38 Ethyl-2 Cyanoacrylate - -    39 Quaternium 15 (Dowicil 200) - -    40 Decyl Glucoside - -    PRESERVATIVES & ANTIMICROBIALS  No Substance 2 days 4 days remarks   41  1,2-Benzisothiazoline-3-One, Sodium Salt - -    42  1,3,5-Antwan (2-Hydroxyethyl) - Hexahydrotriazine (Grotan BK) - -    43 9-Jujsatyglmjah-0-Nitro-1, 3-Propanediol - -    44  3, 4, 4' - Triclocarban - -    45 4 - Chloro - 3 - Cresol - -    46 4 - Chloro - 4 - Xylenol (PCMX) - -    47 7-Ethylbicyclooxazolidine (Bioban [id number]) - -    48 Benzalkonium Chloride - -    49 Benzyl Alcohol - -    50 Cetalkonium Chloride - -    51 Cetylpyrimidine Chloride  - -    NA Chloroacetamide NA NA    52 DMDM Hydantoin - -    53 Glutaraldehyde - -    54 Triclosan - -    55 Glyoxal Trimeric Dihydrate (+) + 7mm Erosion   56 Iodopropynyl Butylcarbamate - -    57 Octylisothiazoline - -    58 Iodoform - -    59 (Nitrobutyl) Morpholine/(Ethylnitro-Trimethylene) Dimorpholine (Bioban P 1487) - -    60 Phenoxyethanol - -    61 Phenyl Salicylate - -    62 Povidone Iodine - -    63 Sodium Benzoate - -    64 Sodium Disulfite - -    65 Sorbic Acid - -    66 Thimerosal - -     Parabens      67 Butyl-P-Hydroxybenzoate - -    68 Ethyl-P-Hydroxybenzoate - -    69 Methyl-P-Hydroxybenzoate - -    70 Propyl-P-Hydroxybenzoate - -    EMULSIFIERS & ADDITIVES  No Substance 2 days 4 days remarks   71 Polyethylene Glycol-400 (+) -    72 Cocamidopropyl Betaine - -    73 Amerchol L101 - -    74 Propylene Glycol - -    75  Triethanolamine - -    76 Sorbitane Sesquiolate - -    77 Isopropylmyristate (+) -    78 Polysorbate 80  - -    79 Amidoamine   (Stearamidopropyl Dimethylamine) - -    80 Oleamidopropyl Dimethylamine - -    81 Lauryl Glucoside - -    82 Coconut Diethanolamide  - -    83 2-Hydroxy-4-Methoxy Benzophenone (Oxybenzone) - -    84 Benzophenone-4 (Sulisobenzon) - -    85 Propolis - -    86 Dexpanthenol - -    87 Carboxymethyl Cellulose Sodium - -    88 Abitol - -    89 Tert-Butylhydroquinone - -    90 Benzyl Salicylate - -     Antioxidant      91 Dodecyl Gallate - -    92 Butylhydroxyanisole (BHA) - -    93 Butylhydroxytoluene (BHT) - -    94 Di-Alpha-Tocopherol (Vit E) - -    95 Propyl Gallate - -    CORTICOSTEROIDS  No Substance 2 days 4 days remarks Allergy  class   96 Amcinonide - -  B   97 Betametasone-17,21 Dipropionate - -  D1   98 Desoximetasone - -  C   99 Betamethasone-17-Valerate - -  D1   100 Dexamethasone - -  C   101 Hydrocortisone - -  A   102 Clobetasol-17-Propionate - -  D1   103 Dexamethasone-21-Phosphate Disodium Salt - -  C   104 Hydrocortisone-17 Butyrate - -  D2   105 Prednisolone - -  A   106 Mometason Furoate - -  D1   107 Triamcinolone Acetonide - -  B   108 Methylprednisolone Aceponate - -  D2   109 Hydrocortisone-21-Acetate - -  A   110 Prednicarbate - -  D2     Group Characteristics of group Generic name Name  cross reactions   A Hydrocortisone   Cloprednole, Fludrocortisone acétate, Hydrocortison acetate, Methylprednisolone, Prednisolone, Tixocortolpivalate Alfacortone, Fucidin H, Dermacalm, Hexacortone, Premandole, Imacort With group D2   B Triamcinolone-acetonide   Budenoside (R-isomer), Amcinonide, Desonide, Fluocinolone acetonide, Triamcinolone acetonide Locapred, Locatop  Synalar, Pevisone, [place] -   C Betamethasone (Without Romina)   Betamethasone, Dexamethasone, Flumethasone pivalate, Halomethasone Daivobet, Dexasalyl, Locasalen,   -   D1 Betamethasone-diproprionate   Betamethasone  dipropionate, Betamethasone-17-valerate, Clobetasole-propionate, Fluticasone propionate, Mometasone furoate Betnovate, Diprogenta, Diprosalic, Diprosone, Celestoderm, Fucicort,  Cutivate, Axotide, Elocom -   D2 Methylprednisolone-aceponate   Hydrocortisone-aceponate, Hydrocortisone-buteprate, Hydrocortisone-17-butyrate, Methylprednisolone aceponate, Prednicarbate Locoïd, Advantan,  Prednitop With group A and Budesonide (S-isomer)     Results of patch tests:                         Interpretation:  - Negative                    A    = Allergic      (+) Erythema    TI   = Toxic/irritant   + E + Infiltration    RaP = Relevance at Present     ++ E/I + Papulovesicle   Rpr  = Relevance Previously     +++ E/I/P + Blister     nR   = No Relevance      Atopy Screen (07/08/2019)  No Substance Readings (15min) Evaluation   POS Histamine 1mg/ml ++    NEG NaCl 0.9% -      No Substance Readings (15min) Evaluation   1 Alternaria alternata (tenuis)  -    2 Cladosporium herbarum -    3 Aspergillus fumigatus -    4 Penicillium notatum -    5 Dermatophagoides pteronyssinus +++    6 Dermatophagoides farinae -    7 Dog epithelium (canis spp) -    8 Cat hair (steve catus) -    9 Cockroach   (Blatella americana & germanica) -    10 Grass mix midwest   (Noris, Orchard, Redtop, Brayden) -    11  grass (sorghum halepense) -    12 Weed mix   (common Cocklebur, Lamb s quarters, rough redroot Pigweed, Dock/Sorrel) -    13 Mugwort (artemisia vulgare) -    14 Ragweed giant/short (ambrosia spp) -    15 English Plantain (plantago lanceolata) -    16 Tree mix 1 (Pecan, Maple BHR, Oak RVW, american [place], black [place]) NA    17 Red cedar (juniperus virginia) -    18 Tree mix 2   (white Dirk, river/red Birch, black [place], common [place], american Elm) NA    19 Box elder/Maple mix (acer spp) -    20 Ellsworth shagbark (carya ovata) -    Conclusions: Patient is atopic with clear immediate type reaction to D. Pteronyssinus (house dust mite).  Patient should observe the molds and house dust mite areas and report back with photos if any delayed reaction there      [x] Allergic reaction diagnosed against: against metals (Cobalt, Nickel and chromate) and as well angry back --> could go well with atopic dermatitis   ==> the reactions are all erosions and not typical allergic, infiltrated lesions.     Interpretation/Remarks:   First of all, after removal patient has angry back (see photo). Based on that the results are difficult to obtain. However, all the reactions were well defined erosions without infiltration and therefore rather irritant reactions with underlying atopic dermatitis than real allergic reactions.  The only real reaction seems to be to the metals, particularly Nickel, which would go well with atopic dermatitis      Assessment & Plan:    ==> Final Diagnosis:   # ***  {[id number]:705296}    #  ***Severe exantematic, generalized Atopic Dermatitis: Atopy skin prick test on 7/8/2019 with clear immediate type reaction to D. Pteronyssinus (house dust mite).   * chronic illness with exacerbation, progression, side effects from treatment  Has been on Dupixent since 10/17/18 300mg every other week and with this treatment skin under control  Use gentle free and clear soaps and shampoos  ==> atopic predisposition with clinically relevant sensitization to house dust mites (D. Pteronyssinus) with Rhinitis entire day = given infos for house dust mite reduction    # Seborrheic Dermatitis   * chronic, stable    These conclusions are made at the best of one's knowledge and belief based on the provided evidence such as patient's history and allergy test results and they can change over time or can be incomplete because of missing information.    ==> Treatment Plan:    >> For atopic dermatitis:  - Continue with Dupixent 300 mg subcutaneously every 2-3 weeks. Refilled x 1 year for autoinjector.  - Discontinue doxepin and antihistamines.  - Continue with Vanicream.  Daily moisturization is encouraged, as it is important to protect the skin barrier.    >> For seborrheic dermatitis:  - Continue ketoconazole 2% shampoo 3x weekly PRN.    Procedures Performed: None    Staff and Scribe: provider    Scribe Disclosure:   I, BRENDON JENNA, am serving as a scribe to document services personally performed by Leon Lanier MD based on data collection and the provider's statements to me.     Staff Physician Comments:  I was present with the scribe who participated in the documentation of the note. I have verified the history and personally performed the physical exam and medical decision making. I agree with the assessment and plan as documented in the note. I have reviewed and if necessary amended the note.      Leon Lanier MD  Professor  Head of Dermato-Allergy Division  Department of Dermatology  [facility]     Follow-up in Derm-Allergy clinic in 10 months for Dupixent check-in    For [place] use [place] [facility] [id number] for [facility]  ___________________________    I spent a total of 20 minutes with Mike Naidu during today s visit. This time was spent discussing all the individual test results, correlating them to the clinical relevance, counseling the patient and/or coordinating care.      Again, thank you for allowing me to participate in the care of your patient.        Sincerely,        Leon Lanier MD

## 2024-11-08 NOTE — PATIENT INSTRUCTIONS
You can always access your most recent office visit note with this allergy clinic via [facility]'s MyChart, which contains all of your results from testing performed by Dr. Lanier along with the details of the discussed treatment plan.  If you do not have access to [facility], please discuss with a [facility] staff member. Additionally, if your provider is within [facility] or their EMR participates in the [facility] network, they can also access this information.

## 2024-11-19 ENCOUNTER — TELEPHONE (OUTPATIENT)
Dept: ALLERGY | Facility: CLINIC | Age: 74
End: 2024-11-19

## 2024-11-19 NOTE — TELEPHONE ENCOUNTER
[facility] Call Center    Phone Message    May a detailed message be left on voicemail: yes     Reason for Call: Other: The [facility] is requesting that the appointment notes for patient from his visit on 11/8/24 be sent over. Please send them to fax # [phone]. Call them to discuss. Thanks.     Action Taken: te sent     Travel Screening: Not Applicable     Date of Service:

## 2024-11-19 NOTE — TELEPHONE ENCOUNTER
Confirmed fax #, faxed 11/8/24 allergy office visit notes to [phone]. Will call later to confirm they received it

## 2024-11-21 ENCOUNTER — DOCUMENTATION ONLY (OUTPATIENT)
Dept: ALLERGY | Facility: CLINIC | Age: 74
End: 2024-11-21

## 2024-11-21 NOTE — PROGRESS NOTES
"Received fax from [facility] of \"update  form\".   No further requests seen in form. Referral # still the same as last office visit.  [facility] confirmed receipt of [facility] from last office visit that goes along with this referral.     Scanned this into pts chart for reference if needed.  "

## 2024-11-25 ENCOUNTER — TELEPHONE (OUTPATIENT)
Dept: DERMATOLOGY | Facility: CLINIC | Age: 74
End: 2024-11-25

## 2024-11-25 NOTE — TELEPHONE ENCOUNTER
[facility] Call Center    Phone Message    May a detailed message be left on voicemail: yes     Reason for Call: Other: Pt provider does not see for this DX on his referral. Would the provider change? Since pt is established with [facility]. Please call pt to discuss.      Action Taken: TE SENT    Travel Screening: Not Applicable     Date of Service:                   Thank you!  [facility]

## 2024-11-25 NOTE — TELEPHONE ENCOUNTER
Jennifer RN from [facility] returned this writers call. No new information given, Jennifer stated that it is [facility] billing's responsibility to ensure coverage for pt.   Will reach out to pt to see what Dr. Martin (referring provider) wanted pt to see Dr. Lanier for.

## 2024-11-25 NOTE — TELEPHONE ENCOUNTER
Spoke to Alysia with [facility], writer wanted to clarify that this new referral for eosinophilia (placed 24). Alsyia states that current  form covers for eosinophilia and dermatitis. Pt LOV with this derm/allergy clinic was 24, dx was dermatitis.   Alysia stated that  doesn't  until 25 so should be fine, but due to this being a new appt (even though pt would be a return), Alysia wanted writer to speak to community care team RN. Phone transferred to [facility]TA[facility] [place], M left for [facility] to call writer back to clarify and confirm.

## 2024-11-26 NOTE — TELEPHONE ENCOUNTER
Spoke to pt, pt confirmed that there is nothing new that pt needs to be seen for soon. Dr. Martin is pts PCP and just places the referral so it is in and ready to use for when pt needs a follow up with Dr. Lanier again.    Writer sent a message to clinics billing team for their help with coverage of the future (already scheduled appt on 25).   Does this current  form referral # cover this appt as it doesn't  until 25?  Does a new  form request need to be sent for coverage?    Pt stated that all appts with Dr. Lanier have been covered thus far through [facility] coverage.

## 2025-07-07 NOTE — TELEPHONE ENCOUNTER
Spoke to Autumn at the [facility] community care team. She is faxing (and emailing) a request for service form to be filled out and then signed by a provider, explaining why this pt needs continuing care through this provider. Instructions and fax back # are on the form (fax # is [phone] per Autumn)/   yes